# Patient Record
Sex: MALE | Race: WHITE | Employment: PART TIME | URBAN - METROPOLITAN AREA
[De-identification: names, ages, dates, MRNs, and addresses within clinical notes are randomized per-mention and may not be internally consistent; named-entity substitution may affect disease eponyms.]

---

## 2019-11-08 LAB — EJECTION FRACTION: NORMAL %

## 2020-01-02 LAB — EJECTION FRACTION: NORMAL %

## 2021-01-16 ENCOUNTER — TRANSFERRED RECORDS (OUTPATIENT)
Dept: HEALTH INFORMATION MANAGEMENT | Facility: CLINIC | Age: 56
End: 2021-01-16

## 2021-01-18 LAB — EJECTION FRACTION: <20 %

## 2021-01-19 PROCEDURE — 99223 1ST HOSP IP/OBS HIGH 75: CPT | Mod: GC | Performed by: INTERNAL MEDICINE

## 2021-01-20 LAB
ALT SERPL-CCNC: 346 IU/L (ref 12–68)
AST SERPL-CCNC: 309 IU/L (ref 12–37)
CREAT SERPL-MCNC: 1.34 MG/DL (ref 0.7–1.3)
GFR SERPL CREATININE-BSD FRML MDRD: 60 ML/MIN
GLUCOSE SERPL-MCNC: 110 MG/DL (ref 74–106)
POTASSIUM SERPL-SCNC: 3.4 MMOL/L (ref 3.5–5.1)

## 2021-01-20 NOTE — H&P
[facility]     Cardiology History and Physical - Cards 2       Date of Admission: 1/21    Assessment & Plan: S    Cordell Adams Jr. is a 55 year old male with a history of alcohol use disorder c/b withdrawal seizures, PE (2019), CAD (s/p PCI to pLAD 2019), afib, and mitral regurgitation transferred from [facility] for management of severe mitral regurgitation, newly diagnosed biventricular failure and afib with RVR.    # HFrEF, acute NYHA functional class III, Stage C  # ICM  Last TTE 1/18/21 (rhythm afib, rate 104) with EF <20% (previously 55-60% 1/2020), severe global hypokinesis/biventricular dysfunction, flail P1 scallop of posterior mitral valve leaflet with severe mitral regurg (anterior jet), severe tricuspid regurg. BNP 1.8k on admission. CXR at OSH with curly b lines bilaterally, cardiomegaly.  Likely etiology multifactorial: CAD, severe mitral regurgitation, possible recurrent pulmonary emboli, alcohol related, etc  - Elevate HOB, supplemental oxygen PRN  - Strict input/output, daily weights  - Na < 2g, 1500 fluid restriction  - Goal BP < 130/80  - Avoid NSAIDs  - Obtain TTE  - Pharmacotherapy  -- ACE inhib/ARB/ARNi: holding PTA lisinopril 5mg  -- Beta blocker: (dec dose if mod HF) metop tartrate 25mg q6h  -- Semaj antag: hold for renal dysfunction  -- Diuretic: lasix 40mg once now; resume 40mg BID in AM  -- Patient was discharged with hydralizine 10mg TID, isordil 5mg TID; unsure if this is needed and concerned re: patient's compliance with a TID med following discharge. Will defer to day team  - Will need TTE 3 months following OMT to determine need for ICD  - Strongly encourage alcohol cessation    # Severe mitral regurgitation  # Severe tricuspid regurgitation  Prolapse of the posterior mitral valve leaflet with possible flail segment; severe mitral regurgitation with an eccentric anteriorly directed jet; first noted in Jan 2019.  Flail  P1 scallop of posterior mitral valve leaflet with severe mitral regurg (anterior jet) The patient was to be evaluated for valve repair. Tricuspid regurgitation newly diagnosed with TTE 1/18. [place] [facility] [place] discussion of mitral valve repair.   - Request TTE images be pushed to PACs (called [phone] but no answer, recommend trying again during business hours)  - CT surgery consult  - NPO + MENDEL order to better characterize valvular dz    # NSTEMI  # Hx CAD, HLD  On admission 1/16, Trop 0.1, subsequently downtrended. Likely type II demand ischemia in setting of new HFrEF, afib with RVR.   Hx: Coronary angiogram 3/5/2019 no disease in the left main artery, severe disease in the left anterior descending artery, minimal disease in the circumflex artery and minimal disease in the right coronary artery. S/p PCI at that time[place] [facility] [place]   - Telemetry  - Trend troponins, EKGs if patient experiences chest pain  - TTE pending   -- ASA, atorvastatin 40mg  -- AC: on heparin gtt  -- Beta blocker: metoprolol succinate 50mg BID  -- ACE inhibitor: Lisinopril 5mg  -- SL NGT PRN  - May need angiography prior to further decision for valve repair    # Paroxysmal Afib with RVR, currently rate controlled  Rates at OSH 110s-180s recently. Prior to hospitalization was prescribed Amiodarone 200mg, Metoprolol succinate 50mg [but patient was not taking]. Has failed cardioversion x 2 in past. CHADSVASC is 2 (CHF, MI) FT4 was elevated at 1.78 (1.46 ULN), though difficult to interpret in acute setting. Hypotensive with 5mg IV metoprolol in ED at OSH. Has been treated with bolus diltiazem and also gtt but most recently metoprolol 75mg BID and digoxin 125mcg prior to transfer  - Continue anticoagulation  - Metoprolol succinate 50mg BID  - Digoxin 125mcg daily  -- Level ordered for AM  - Per OSH cardiology, likely opt for amiodarone if RVR refractory to above  - MENDEL needed if DCCV considered    # Acute hypoxic  respiratory failure, resolved  Per chart, developed this in hospital. Likely related to fluid overload given resuscitation in ED and maintenance fluids to treat CHRISTOPHER. Improved with lasix gtt and patient was weaned to room air on 1/18. Procalcitonin was elevated 1/19 and he was started on ceftriaxone for possible aspiration pneumonia. At this point, he is not having infectious symptoms but he did have reports of fevers, cough, nausea PTA. Will continue for 2 more days to complete a 5 day course.   - Ceftriaxone 2g q24 1/19-1/23    # Upper extremity blood pressure differential  Notified by RN overnight that the patient has had a persistent discrepancy in BP readings: L arm is consistently <20mmHg compared with right. Patient asymptomatic. Initial BP measurements in L arm, however, were WNL. Given his history of CAD, do think he's at increased risk for PAD. Reasonable to ultrasound.   - BUE arterial duplex  - Day team to assess for claudication symptoms    # Prediabetes   A1c 5.7 at OSH. Was hyperglycemic intermittently.   - BID, HS BG; if high, can consider sliding scale insulin (was on this at OSH)    # Hypervolemic hyponatremia, improving  129 on admission, 135 prior to transfer. Will continue to monitor.    # Hx PE  Diagnosis in 2018. Noncompliance with apixaban due to cost. Was started on heparin gtt at OSH due to high suspicion for PE (d-dimer elevated but CTPA not performed due to CHRISTOPHER). At this point, do not have a high suspicion for PE.   - Anticoagulation for NSTEMI/afib as above  - Likely resume apixaban at discharge if low cost can be confirmed    # CHRISTOPHER, resolved  Cr was 2 1/16, baseline of 1. Likely 2/2 cardiorenal in light of the above. Improving. UA from OSH unremarkable.   - Continue to monitor    # Alcoholic hepatitis  # Direct hyperbilirubinemia  # Coagulopathy  Hepatocellular injury pattern according to r factor. Likely related to alcohol use, but may a have some component of hepatic congestion due to  HF. 1/20 RUQ US Coarsened heterogeneous echotexture of the hepatic parenchyma with increased echogenicity, suggesting hepatic steatosis. No surface contour nodularity or solid mass. Maddrey's discriminant function 49.7 (poor prognosis) indicating that he may benefit from glucocorticoid therapy. Down from 55 earlier this admission. Hepatitis panel, HIV at [facility] negative 1/18/21.   - Trend LFTs  - Consider GI consult  - Stress importance of alcohol cessation    # Alcohol use disorder  EtOH 168 mg/dL at OSH. Last drink was 1/16. Requiring PO diazepam at OSH for withdrawal. CD consult at OSH offered counseling and resource information. Scoring 1, 2 on CIWA prior to transfer, per notes. No benzos given 1/21 prior to transfer.   - CIWA protocol for scoring only, notify team if scoring high  - Thiamine/folate    # R Anterior chest wall sebaceous cyst  Pt was seen in 02/2020 to discuss mitral valve repair, and at that time, was referred to general surgery for removal of an anterior chest wall sebaceous cyst. cyst has been present for 12-15 years, and has enlarged slowly over time. Was planning to have cyst removed by general surgery 1/20 with MAC after holding heparin gtt 6 hrs. But this was cancelled in light of transfer  - Consider general surgery consult     Diet: CCH/2g Na/1500mL fluid restriction  DVT Prophylaxis: On therapeutic AC  Turner Catheter: not present  Code Status: Full  Fluids: 1500 fluid restriction  Lines: peripheral IVs     Disposition Plan   Expected discharge: 2 - 3 days, recommended to prior living arrangement once optimize volue status on PO meds and plan for management of severe mitral regurgitaion.    Entered: Angela Salvador MD 01/19/2021, 8:09 PM     The patient's care was discussed with the Attending Physician, Dr. Becerra.    Angela Salvador MD  [facility]   Please see sign in/sign out for up to date coverage  information  ______________________________________________________________________    Chief Complaint   Shortness of breath    History is obtained from the patient and by chart review.    History of Present Illness   Cordell Adams Jr. is a 55 year old male with a history of alcohol use disorder c/b withdrawal, PE (2019), CAD (s/p PCI to pLAD 2019), afib, and mitral regurgitation transferred from [facility] for management of newly diagnosed biventricular failure and afib with RVR.     The patient presented to [facility] on 1/16 with complaints of shortness of breath, productive cough, nausea, fevers, and vomiting for a week. He also reported decreased appetite, orthopnea and occasional chest discomfort. He was drinking approximately 1 liter of vodka daily. He had been noncompliant with his medications (including xarelto) due to cost since May 2020.    On presentation he was found to be in afiv with RVR to the 160s. COVID was negative 1/16. Attempted rate control with 5mg IV metoprolol, however the patient became hypotensive. He was given IV fluids (per chart, 2L ordered but notes say only 1L given in ED). 20mg IV diltiazem was helpful with rate control. D-dimer was high and he had an CHRISTOPHER with Cr of 2. He was started on high dose heparin for possible PE as well as for anticoagulation for afib. CT PE was deferred given his CHRISTOPHER. He was started on continuous fluids at 75/hr for CHRISTOPHER through 1/18, developing an oxygen requirement in that time. TTE demonstrated significantly reduced EF (<20%) compared with prior. He was started on a lasix drip. Alcohol withdrawal treated with CIWA. General surgery was consulted for a sebaceous cyst on his anterior chest (apparently this was indicated prior to discussion of mitral valve repair). He was scheduled to have it removed 1/20, but this was deferred due to transfer.    Mr Adams states he hasn't been taking his meds since May 2020. He has no history of HTN that he  knows of. Thinks he has a history of withdrawal seizures but is not sure when the last one occured. He reports his breathing is 'so-so', and that he feels short of breath with walking short distances with PT. He denies hest pain, light-headedness, shortness of breath, palpitations, edema; has been experiencing orthopnea, PND. Has been having lots of urine output with lasix. He denies any symptoms of alcohol withdrawal today. Denies fevers, chills, headache, sore throat, productive cough, abdominal pain/fullness, constipation/diarrhea, nausea/vomiting, difficulty urinating, dysuria, weakness, bleeding/bruising, new skin findings.     The patient lives in an apartment with his finance; Natasha ([phone]). He is independent at baseline and doesn't use any medical devices.     Past Medical History    I have reviewed this patient's medical history and updated it with pertinent information if needed.   No past medical history on file.    Past Surgical History   I have reviewed this patient's surgical history and updated it with pertinent information if needed.  No past surgical history on file.    Social History   I have reviewed this patient's social history and updated it with pertinent information if needed.  Social History     Tobacco Use     Smoking status: Not on file   Substance Use Topics     Alcohol use: Not on file     Drug use: Not on file     Family History   I have reviewed this patient's family history and updated it with pertinent information if needed.     Family history of diabetes, alcohol abuse.     Prior to Admission Medications   Cannot display prior to admission medications because the patient has not been admitted in this contact.     Allergies   Not on File    Physical Exam   Vital Signs:                    Weight: 0 lbs 0 oz    General Appearance: tired-appearing male, lying in bed  Eyes: EOMI, PERRL, mild scleral icterus  HEENT: NCAT, neck supple  Respiratory: Normal wor  Cardiovascular:  tachycardic, irregularly irregular rhythm; 3/6 systolic murmur heard at apex; 2+ peripheral pulses; no edema; no JVD appreciated  GI: abdomen soft, nontender; normal bowel sounds  Skin: large cyst on right anterior chest wall with visible punctum, minimal erythema  Musculoskeletal: no overt joint swelling  Neurologic: A&O, CN II-XII grossly intact, moving all extremities spontaneously  Psychiatric: neutral mood, flat affect    Data   Data reviewed today: I reviewed all medications, new labs and imaging results over the last 24 hours. I personally reviewed Cardiology specific data review: the EKG tracing showing afib with RVR     Recent Labs   Lab 01/21/21  2152 01/21/21  1845   WBC 5.6 6.5   HGB 16.0 16.8   MCV 99 97    183   INR  --  1.23*   NA  --  132*   POTASSIUM  --  4.2   CHLORIDE  --  101   CO2  --  24   BUN  --  34*   CR  --  1.15   ANIONGAP  --  8   BRAD  --  8.8   GLC  --  115*   ALBUMIN  --  2.7*   PROTTOTAL  --  6.1*   BILITOTAL  --  6.1*   ALKPHOS  --  161*   ALT  --  242*   AST  --  143*        US UPPER ABD 1/21/21  GALLBLADDER: No evidence of stones, wall thickening, or pericholecystic fluid.   BILE DUCTS: No biliary ductal dilatation.   LIVER: Diffusely coarsened and increased density throughout the liver. Small right hepatic cyst again noted.   SPLEEN: Normal sonographic appearance. Incidental note of a splenule measuring 20 cm, no   KIDNEYS: No hydronephrosis.   PANCREAS: No gross abnormality identified.   AORTA: Patent. Normal Color flow and spectral waveforms.   IVC and Portal Vein: Patent. Normal Color flow and spectral waveforms.   IMPRESSION:  Diffusely increased hepatic echogenicity most consistent with underlying diffuse fatty infiltration of liver. No focal abnormality. No biliary ductal dilatation.     ECHO 1/18/21  Interpretation Summary    * The left ventricular systolic function is severely decreased, estimated  LVEF <20% with severe global hypokinesis.  No LV thrombus visualized  with  contrast.    * The right ventricle is quantitatively enlarged with quantitatively reduced  right ventricular systolic function..    * Flail P1 scallop of the posterior mitral valve leaflet with severe mitral  regurgitation (anteriorly directed jet).    * There is severe tricuspid regurgitation.    * Pulmonary hypertension is not accurately quantified in the setting of  severe tricuspid regurgitation.    * Compared to prior study dated 01/02/2020, LV function has now declined  from normal.  Severe mitral regurgitation persists.     MENDEL 11/8/19 Summary    * No mass or thrombus formation in the left atrial appendage.    * The left ventricular systolic function is moderately decreased, estimated  LVEF 35-40%.    * Left ventricular wall motion is abnormal with moderate global hypokinesis  in the setting of afib with rvr.    * Quantitatively reduced right ventricular systolic function.    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    * Hepatic cyst noted, recommend hepatic ultrasound     ECHO 1/2/20  Interpretation Summary    * The left ventricle is mildly dilated with normal systolic function;  estimated LVEF 55-60%.    * Left ventricular wall motion is normal.    * Normal right ventricular systolic function.    * There is prolapse of the posterior mitral valve leaflet with possible  flail segment; there is severe mitral regurgitation with an eccentric  anteriorly directed jet.    * No pulmonary hypertension, estimated right ventricular systolic pressure  is 32 mmHg.    * Compared to prior study on 11/4/2019, the left ventricular systolic  function has improved and normalized on today's study; the degree of MR  remains severe with posterior leaflet proplase.     ECG 1/16/21:  Afib with rvr     Coronary Angiogram 3/5/2019:  Diagnostic Summary    * Angiography shows a right coronary dominant system.    * Coronary angiography  shows no disease in the left main artery, severe  disease in the left anterior descending artery, minimal disease in the  circumflex artery and minimal disease in the right coronary artery.    * Coronary angiography shows severe disease in the pLAD artery.     CXR 1/16/2021:  IMPRESSION:  1.  Minimal right base atelectasis.  2.  Unchanged mild cardiomegaly.

## 2021-01-21 ENCOUNTER — APPOINTMENT (OUTPATIENT)
Dept: GENERAL RADIOLOGY | Facility: CLINIC | Age: 56
End: 2021-01-21
Attending: INTERNAL MEDICINE
Payer: COMMERCIAL

## 2021-01-21 ENCOUNTER — HOSPITAL ENCOUNTER (INPATIENT)
Facility: CLINIC | Age: 56
LOS: 7 days | Discharge: HOME | End: 2021-01-28
Attending: INTERNAL MEDICINE | Admitting: INTERNAL MEDICINE
Payer: COMMERCIAL

## 2021-01-21 DIAGNOSIS — I34.0 NONRHEUMATIC MITRAL VALVE REGURGITATION: ICD-10-CM

## 2021-01-21 DIAGNOSIS — I50.9 ACUTE DECOMPENSATED HEART FAILURE (H): Primary | ICD-10-CM

## 2021-01-21 LAB
ALBUMIN SERPL-MCNC: 2.7 G/DL (ref 3.4–5)
ALP SERPL-CCNC: 161 U/L (ref 40–150)
ALT SERPL W P-5'-P-CCNC: 242 U/L (ref 0–70)
ANION GAP SERPL CALCULATED.3IONS-SCNC: 8 MMOL/L (ref 3–14)
APTT PPP: 49 SEC (ref 22–37)
APTT PPP: 74 SEC (ref 22–37)
APTT PPP: 93 SEC (ref 22–37)
APTT PPP: NORMAL SEC (ref 22–37)
AST SERPL W P-5'-P-CCNC: 143 U/L (ref 0–45)
BILIRUB DIRECT SERPL-MCNC: 4.6 MG/DL (ref 0–0.2)
BILIRUB SERPL-MCNC: 6.1 MG/DL (ref 0.2–1.3)
BUN SERPL-MCNC: 34 MG/DL (ref 7–30)
CALCIUM SERPL-MCNC: 8.8 MG/DL (ref 8.5–10.1)
CHLORIDE SERPL-SCNC: 101 MMOL/L (ref 94–109)
CO2 SERPL-SCNC: 24 MMOL/L (ref 20–32)
CREAT SERPL-MCNC: 1.15 MG/DL (ref 0.66–1.25)
ERYTHROCYTE [DISTWIDTH] IN BLOOD BY AUTOMATED COUNT: 15.7 % (ref 10–15)
ERYTHROCYTE [DISTWIDTH] IN BLOOD BY AUTOMATED COUNT: 15.9 % (ref 10–15)
GFR SERPL CREATININE-BSD FRML MDRD: 72 ML/MIN/{1.73_M2}
GLUCOSE SERPL-MCNC: 115 MG/DL (ref 70–99)
HCT VFR BLD AUTO: 45.9 % (ref 40–53)
HCT VFR BLD AUTO: 48.3 % (ref 40–53)
HGB BLD-MCNC: 16 G/DL (ref 13.3–17.7)
HGB BLD-MCNC: 16.8 G/DL (ref 13.3–17.7)
INR PPP: 1.23 (ref 0.86–1.14)
MAGNESIUM SERPL-MCNC: 2.2 MG/DL (ref 1.6–2.3)
MCH RBC QN AUTO: 33.9 PG (ref 26.5–33)
MCH RBC QN AUTO: 34.3 PG (ref 26.5–33)
MCHC RBC AUTO-ENTMCNC: 34.8 G/DL (ref 31.5–36.5)
MCHC RBC AUTO-ENTMCNC: 34.9 G/DL (ref 31.5–36.5)
MCV RBC AUTO: 97 FL (ref 78–100)
MCV RBC AUTO: 99 FL (ref 78–100)
NT-PROBNP SERPL-MCNC: 1713 PG/ML (ref 0–900)
PHOSPHATE SERPL-MCNC: 3.5 MG/DL (ref 2.5–4.5)
PLATELET # BLD AUTO: 166 10E9/L (ref 150–450)
PLATELET # BLD AUTO: 183 10E9/L (ref 150–450)
POTASSIUM SERPL-SCNC: 4.2 MMOL/L (ref 3.4–5.3)
PROT SERPL-MCNC: 6.1 G/DL (ref 6.8–8.8)
RBC # BLD AUTO: 4.66 10E12/L (ref 4.4–5.9)
RBC # BLD AUTO: 4.96 10E12/L (ref 4.4–5.9)
SODIUM SERPL-SCNC: 132 MMOL/L (ref 133–144)
WBC # BLD AUTO: 5.6 10E9/L (ref 4–11)
WBC # BLD AUTO: 6.5 10E9/L (ref 4–11)

## 2021-01-21 PROCEDURE — 84100 ASSAY OF PHOSPHORUS: CPT | Performed by: INTERNAL MEDICINE

## 2021-01-21 PROCEDURE — 36415 COLL VENOUS BLD VENIPUNCTURE: CPT | Performed by: INTERNAL MEDICINE

## 2021-01-21 PROCEDURE — 83735 ASSAY OF MAGNESIUM: CPT | Performed by: INTERNAL MEDICINE

## 2021-01-21 PROCEDURE — 93005 ELECTROCARDIOGRAM TRACING: CPT

## 2021-01-21 PROCEDURE — 71045 X-RAY EXAM CHEST 1 VIEW: CPT

## 2021-01-21 PROCEDURE — 85730 THROMBOPLASTIN TIME PARTIAL: CPT | Performed by: INTERNAL MEDICINE

## 2021-01-21 PROCEDURE — 93010 ELECTROCARDIOGRAM REPORT: CPT | Performed by: INTERNAL MEDICINE

## 2021-01-21 PROCEDURE — 71045 X-RAY EXAM CHEST 1 VIEW: CPT | Mod: 26 | Performed by: RADIOLOGY

## 2021-01-21 PROCEDURE — 250N000013 HC RX MED GY IP 250 OP 250 PS 637: Performed by: STUDENT IN AN ORGANIZED HEALTH CARE EDUCATION/TRAINING PROGRAM

## 2021-01-21 PROCEDURE — 85027 COMPLETE CBC AUTOMATED: CPT | Performed by: STUDENT IN AN ORGANIZED HEALTH CARE EDUCATION/TRAINING PROGRAM

## 2021-01-21 PROCEDURE — 85610 PROTHROMBIN TIME: CPT | Performed by: INTERNAL MEDICINE

## 2021-01-21 PROCEDURE — 83880 ASSAY OF NATRIURETIC PEPTIDE: CPT | Performed by: INTERNAL MEDICINE

## 2021-01-21 PROCEDURE — 999N001064 HC STATISTIC DRUG SCREEN MULTIPLE (METRO): Performed by: INTERNAL MEDICINE

## 2021-01-21 PROCEDURE — 85730 THROMBOPLASTIN TIME PARTIAL: CPT | Performed by: STUDENT IN AN ORGANIZED HEALTH CARE EDUCATION/TRAINING PROGRAM

## 2021-01-21 PROCEDURE — 36415 COLL VENOUS BLD VENIPUNCTURE: CPT | Performed by: STUDENT IN AN ORGANIZED HEALTH CARE EDUCATION/TRAINING PROGRAM

## 2021-01-21 PROCEDURE — 80076 HEPATIC FUNCTION PANEL: CPT | Performed by: INTERNAL MEDICINE

## 2021-01-21 PROCEDURE — 85027 COMPLETE CBC AUTOMATED: CPT | Performed by: INTERNAL MEDICINE

## 2021-01-21 PROCEDURE — 250N000011 HC RX IP 250 OP 636: Performed by: STUDENT IN AN ORGANIZED HEALTH CARE EDUCATION/TRAINING PROGRAM

## 2021-01-21 PROCEDURE — 80307 DRUG TEST PRSMV CHEM ANLYZR: CPT | Performed by: INTERNAL MEDICINE

## 2021-01-21 PROCEDURE — 80048 BASIC METABOLIC PNL TOTAL CA: CPT | Performed by: INTERNAL MEDICINE

## 2021-01-21 PROCEDURE — 214N000001 HC R&B CCU UMMC

## 2021-01-21 RX ORDER — NITROGLYCERIN 0.4 MG/1
0.4 TABLET SUBLINGUAL EVERY 5 MIN PRN
Status: DISCONTINUED | OUTPATIENT
Start: 2021-01-21 | End: 2021-01-28 | Stop reason: HOSPADM

## 2021-01-21 RX ORDER — AMOXICILLIN 250 MG
1 CAPSULE ORAL 2 TIMES DAILY
Status: DISCONTINUED | OUTPATIENT
Start: 2021-01-21 | End: 2021-01-28 | Stop reason: HOSPADM

## 2021-01-21 RX ORDER — MAGNESIUM HYDROXIDE/ALUMINUM HYDROXICE/SIMETHICONE 120; 1200; 1200 MG/30ML; MG/30ML; MG/30ML
30 SUSPENSION ORAL EVERY 4 HOURS PRN
Status: DISCONTINUED | OUTPATIENT
Start: 2021-01-21 | End: 2021-01-28 | Stop reason: HOSPADM

## 2021-01-21 RX ORDER — ACETAMINOPHEN 325 MG/1
650 TABLET ORAL EVERY 4 HOURS PRN
Status: DISCONTINUED | OUTPATIENT
Start: 2021-01-21 | End: 2021-01-28 | Stop reason: HOSPADM

## 2021-01-21 RX ORDER — HEPARIN SODIUM 10000 [USP'U]/100ML
0-5000 INJECTION, SOLUTION INTRAVENOUS CONTINUOUS
Status: DISCONTINUED | OUTPATIENT
Start: 2021-01-21 | End: 2021-01-25

## 2021-01-21 RX ORDER — POLYETHYLENE GLYCOL 3350 17 G/17G
17 POWDER, FOR SOLUTION ORAL DAILY
Status: DISCONTINUED | OUTPATIENT
Start: 2021-01-21 | End: 2021-01-28 | Stop reason: HOSPADM

## 2021-01-21 RX ORDER — FUROSEMIDE 10 MG/ML
40 INJECTION INTRAMUSCULAR; INTRAVENOUS ONCE
Status: COMPLETED | OUTPATIENT
Start: 2021-01-21 | End: 2021-01-21

## 2021-01-21 RX ORDER — HEPARIN SODIUM 10000 [USP'U]/100ML
0-5000 INJECTION, SOLUTION INTRAVENOUS CONTINUOUS
Status: DISCONTINUED | OUTPATIENT
Start: 2021-01-21 | End: 2021-01-21

## 2021-01-21 RX ORDER — LIDOCAINE 40 MG/G
CREAM TOPICAL
Status: DISCONTINUED | OUTPATIENT
Start: 2021-01-21 | End: 2021-01-22

## 2021-01-21 RX ORDER — METOPROLOL SUCCINATE 50 MG/1
50 TABLET, EXTENDED RELEASE ORAL 2 TIMES DAILY
Status: DISCONTINUED | OUTPATIENT
Start: 2021-01-21 | End: 2021-01-27

## 2021-01-21 RX ORDER — AMOXICILLIN 250 MG
2 CAPSULE ORAL 2 TIMES DAILY
Status: DISCONTINUED | OUTPATIENT
Start: 2021-01-21 | End: 2021-01-28 | Stop reason: HOSPADM

## 2021-01-21 RX ADMIN — HEPARIN SODIUM 900 UNITS/HR: 10000 INJECTION, SOLUTION INTRAVENOUS at 21:54

## 2021-01-21 RX ADMIN — METOPROLOL SUCCINATE 50 MG: 50 TABLET, EXTENDED RELEASE ORAL at 21:58

## 2021-01-21 RX ADMIN — HEPARIN SODIUM 1350 UNITS/HR: 10000 INJECTION, SOLUTION INTRAVENOUS at 20:38

## 2021-01-21 RX ADMIN — FUROSEMIDE 40 MG: 10 INJECTION, SOLUTION INTRAVENOUS at 22:00

## 2021-01-21 ASSESSMENT — ACTIVITIES OF DAILY LIVING (ADL)
ADLS_ACUITY_SCORE: 15
DOING_ERRANDS_INDEPENDENTLY_DIFFICULTY: NO

## 2021-01-21 ASSESSMENT — MIFFLIN-ST. JEOR: SCORE: 1562.5

## 2021-01-21 NOTE — Clinical Note
dry, intact, no bleeding and no hematoma. 7fr RIJ sheath removed manual pressure applied, hemostasis achieved, bandage placed, 6fr RRA sheath removed, TR band placed with 12 ml of air.

## 2021-01-22 ENCOUNTER — APPOINTMENT (OUTPATIENT)
Dept: ULTRASOUND IMAGING | Facility: CLINIC | Age: 56
End: 2021-01-22
Attending: INTERNAL MEDICINE
Payer: COMMERCIAL

## 2021-01-22 ENCOUNTER — APPOINTMENT (OUTPATIENT)
Dept: CT IMAGING | Facility: CLINIC | Age: 56
End: 2021-01-22
Attending: PHYSICIAN ASSISTANT
Payer: COMMERCIAL

## 2021-01-22 ENCOUNTER — APPOINTMENT (OUTPATIENT)
Dept: CARDIOLOGY | Facility: CLINIC | Age: 56
End: 2021-01-22
Attending: INTERNAL MEDICINE
Payer: COMMERCIAL

## 2021-01-22 LAB
ACETAMINOPHEN QUAL: NEGATIVE
ALBUMIN SERPL-MCNC: 2.8 G/DL (ref 3.4–5)
ALP SERPL-CCNC: 159 U/L (ref 40–150)
ALT SERPL W P-5'-P-CCNC: 226 U/L (ref 0–70)
AMANTADINE: NEGATIVE
AMITRIPTYLINE QUAL: NEGATIVE
AMOXAPINE: NEGATIVE
AMPHETAMINES QUAL: NEGATIVE
ANION GAP SERPL CALCULATED.3IONS-SCNC: 10 MMOL/L (ref 3–14)
ANION GAP SERPL CALCULATED.3IONS-SCNC: 8 MMOL/L (ref 3–14)
AST SERPL W P-5'-P-CCNC: 114 U/L (ref 0–45)
ATROPINE: NEGATIVE
BENZODIAZ UR QL: POSITIVE
BILIRUB SERPL-MCNC: 5.5 MG/DL (ref 0.2–1.3)
BUN SERPL-MCNC: 34 MG/DL (ref 7–30)
BUN SERPL-MCNC: 35 MG/DL (ref 7–30)
BUPROPION QUAL: NEGATIVE
CAFFEINE QUAL: POSITIVE
CALCIUM SERPL-MCNC: 8.8 MG/DL (ref 8.5–10.1)
CALCIUM SERPL-MCNC: 9.1 MG/DL (ref 8.5–10.1)
CANNABINOIDS UR QL SCN: NEGATIVE
CARBAMAZEPINE QUAL: NEGATIVE
CHLORIDE SERPL-SCNC: 101 MMOL/L (ref 94–109)
CHLORIDE SERPL-SCNC: 98 MMOL/L (ref 94–109)
CHLORPHENIRAMINE: NEGATIVE
CHLORPROMAZINE: NEGATIVE
CITALOPRAM QUAL: NEGATIVE
CLOMIPRAMINE QUAL: NEGATIVE
CMV IGG SERPL QL IA: <0.2 AI (ref 0–0.8)
CO2 SERPL-SCNC: 23 MMOL/L (ref 20–32)
CO2 SERPL-SCNC: 24 MMOL/L (ref 20–32)
COCAINE QUAL: NEGATIVE
COCAINE UR QL: NEGATIVE
CODEINE QUAL: NEGATIVE
CREAT SERPL-MCNC: 1.11 MG/DL (ref 0.66–1.25)
CREAT SERPL-MCNC: 1.24 MG/DL (ref 0.66–1.25)
DESIPRAMINE QUAL: NEGATIVE
DEXTROMETHORPHAN: NEGATIVE
DIGOXIN SERPL-MCNC: 1.9 UG/L (ref 0.5–2)
DIPHENHYDRAMINE: NEGATIVE
DOXEPIN/METABOLITE: NEGATIVE
DOXYLAMINE: NEGATIVE
EPHEDRINE OR PSEUDO: NEGATIVE
FENTANYL QUAL: NEGATIVE
FLUOXETINE AND METAB: NEGATIVE
GFR SERPL CREATININE-BSD FRML MDRD: 66 ML/MIN/{1.73_M2}
GFR SERPL CREATININE-BSD FRML MDRD: 75 ML/MIN/{1.73_M2}
GLUCOSE BLDC GLUCOMTR-MCNC: 155 MG/DL (ref 70–99)
GLUCOSE BLDC GLUCOMTR-MCNC: 200 MG/DL (ref 70–99)
GLUCOSE SERPL-MCNC: 114 MG/DL (ref 70–99)
GLUCOSE SERPL-MCNC: 178 MG/DL (ref 70–99)
HAV IGG SER QL IA: NONREACTIVE
HBV CORE AB SERPL QL IA: NONREACTIVE
HBV CORE IGM SERPL QL IA: NONREACTIVE
HBV SURFACE AB SERPL IA-ACNC: 0 M[IU]/ML
HBV SURFACE AG SERPL QL IA: NONREACTIVE
HCV AB SERPL QL IA: NONREACTIVE
HYDROCODONE QUAL: NEGATIVE
HYDROMORPHONE QUAL: NEGATIVE
IBUPROFEN QUAL: NEGATIVE
IMIPRAMINE QUAL: NEGATIVE
INTERPRETATION ECG - MUSE: NORMAL
KETAMINE QUAL: NEGATIVE
LAMOTRIGINE QUAL: NEGATIVE
LIDOCAIN SPEC QL: NEGATIVE
LOXAPINE: NEGATIVE
MAGNESIUM SERPL-MCNC: 2 MG/DL (ref 1.6–2.3)
MAGNESIUM SERPL-MCNC: 2 MG/DL (ref 1.6–2.3)
MAPROTYLINE: NEGATIVE
MDMA QUAL: NEGATIVE
MEPERIDINE QUAL: NEGATIVE
METHAMPHETAMINE: NEGATIVE
METHODONE QUAL: NEGATIVE
MIRTAZAPINE QUAL: NEGATIVE
MORPHINE QUAL: NEGATIVE
NICOTINE: NEGATIVE
NORTRIPTYLINE QUAL: NEGATIVE
OLANZAPINE QUAL: NEGATIVE
OPIATES UR QL SCN: NEGATIVE
OXYCODONE QUAL: NEGATIVE
PENTAZOCINE: NEGATIVE
PHENCYCLIDINE QUAL: NEGATIVE
PHENTERMINE: NEGATIVE
POTASSIUM SERPL-SCNC: 3.6 MMOL/L (ref 3.4–5.3)
POTASSIUM SERPL-SCNC: 3.8 MMOL/L (ref 3.4–5.3)
PROPOFOL QUAL: NEGATIVE
PROPOXPHENE QUAL: NEGATIVE
PROPRANOLOL QUAL: NEGATIVE
PROT SERPL-MCNC: 6.2 G/DL (ref 6.8–8.8)
PYRILAMINE: NEGATIVE
QUETIAPINE METAB QUAL: NEGATIVE
SALICYLATE QUAL: NEGATIVE
SERTRALINE QUAL: NEGATIVE
SODIUM SERPL-SCNC: 131 MMOL/L (ref 133–144)
SODIUM SERPL-SCNC: 133 MMOL/L (ref 133–144)
THEOBROMINE: NEGATIVE
TOPIRAMATE QUAL: NEGATIVE
TRAMADOL QUAL: NEGATIVE
TRIMIPRAMINE QUAL: NEGATIVE
UFH PPP CHRO-ACNC: 0.3 IU/ML
UFH PPP CHRO-ACNC: 0.33 IU/ML
VENLAFAXINE QUAL: NEGATIVE

## 2021-01-22 PROCEDURE — 86706 HEP B SURFACE ANTIBODY: CPT | Performed by: STUDENT IN AN ORGANIZED HEALTH CARE EDUCATION/TRAINING PROGRAM

## 2021-01-22 PROCEDURE — 85520 HEPARIN ASSAY: CPT | Performed by: STUDENT IN AN ORGANIZED HEALTH CARE EDUCATION/TRAINING PROGRAM

## 2021-01-22 PROCEDURE — 250N000013 HC RX MED GY IP 250 OP 250 PS 637: Performed by: STUDENT IN AN ORGANIZED HEALTH CARE EDUCATION/TRAINING PROGRAM

## 2021-01-22 PROCEDURE — 93325 DOPPLER ECHO COLOR FLOW MAPG: CPT

## 2021-01-22 PROCEDURE — 250N000011 HC RX IP 250 OP 636: Performed by: INTERNAL MEDICINE

## 2021-01-22 PROCEDURE — 71250 CT THORAX DX C-: CPT

## 2021-01-22 PROCEDURE — 250N000009 HC RX 250: Performed by: INTERNAL MEDICINE

## 2021-01-22 PROCEDURE — 74176 CT ABD & PELVIS W/O CONTRAST: CPT | Mod: 26 | Performed by: RADIOLOGY

## 2021-01-22 PROCEDURE — 99153 MOD SED SAME PHYS/QHP EA: CPT | Performed by: INTERNAL MEDICINE

## 2021-01-22 PROCEDURE — 36415 COLL VENOUS BLD VENIPUNCTURE: CPT | Performed by: STUDENT IN AN ORGANIZED HEALTH CARE EDUCATION/TRAINING PROGRAM

## 2021-01-22 PROCEDURE — 93930 UPPER EXTREMITY STUDY: CPT

## 2021-01-22 PROCEDURE — 86708 HEPATITIS A ANTIBODY: CPT | Performed by: STUDENT IN AN ORGANIZED HEALTH CARE EDUCATION/TRAINING PROGRAM

## 2021-01-22 PROCEDURE — 86705 HEP B CORE ANTIBODY IGM: CPT | Performed by: STUDENT IN AN ORGANIZED HEALTH CARE EDUCATION/TRAINING PROGRAM

## 2021-01-22 PROCEDURE — 36415 COLL VENOUS BLD VENIPUNCTURE: CPT | Performed by: INTERNAL MEDICINE

## 2021-01-22 PROCEDURE — 80053 COMPREHEN METABOLIC PANEL: CPT | Performed by: STUDENT IN AN ORGANIZED HEALTH CARE EDUCATION/TRAINING PROGRAM

## 2021-01-22 PROCEDURE — 80048 BASIC METABOLIC PNL TOTAL CA: CPT | Performed by: STUDENT IN AN ORGANIZED HEALTH CARE EDUCATION/TRAINING PROGRAM

## 2021-01-22 PROCEDURE — 214N000001 HC R&B CCU UMMC

## 2021-01-22 PROCEDURE — 83735 ASSAY OF MAGNESIUM: CPT | Performed by: STUDENT IN AN ORGANIZED HEALTH CARE EDUCATION/TRAINING PROGRAM

## 2021-01-22 PROCEDURE — 87340 HEPATITIS B SURFACE AG IA: CPT | Performed by: STUDENT IN AN ORGANIZED HEALTH CARE EDUCATION/TRAINING PROGRAM

## 2021-01-22 PROCEDURE — 71250 CT THORAX DX C-: CPT | Mod: 26 | Performed by: RADIOLOGY

## 2021-01-22 PROCEDURE — 76376 3D RENDER W/INTRP POSTPROCES: CPT | Mod: 26 | Performed by: INTERNAL MEDICINE

## 2021-01-22 PROCEDURE — 258N000003 HC RX IP 258 OP 636: Performed by: INTERNAL MEDICINE

## 2021-01-22 PROCEDURE — 999N001017 HC STATISTIC GLUCOSE BY METER IP

## 2021-01-22 PROCEDURE — 85520 HEPARIN ASSAY: CPT | Performed by: INTERNAL MEDICINE

## 2021-01-22 PROCEDURE — 76376 3D RENDER W/INTRP POSTPROCES: CPT

## 2021-01-22 PROCEDURE — 86644 CMV ANTIBODY: CPT | Performed by: STUDENT IN AN ORGANIZED HEALTH CARE EDUCATION/TRAINING PROGRAM

## 2021-01-22 PROCEDURE — 93312 ECHO TRANSESOPHAGEAL: CPT | Mod: 26 | Performed by: INTERNAL MEDICINE

## 2021-01-22 PROCEDURE — 86704 HEP B CORE ANTIBODY TOTAL: CPT | Performed by: STUDENT IN AN ORGANIZED HEALTH CARE EDUCATION/TRAINING PROGRAM

## 2021-01-22 PROCEDURE — 76705 ECHO EXAM OF ABDOMEN: CPT | Mod: 26 | Performed by: RADIOLOGY

## 2021-01-22 PROCEDURE — 76705 ECHO EXAM OF ABDOMEN: CPT

## 2021-01-22 PROCEDURE — 80162 ASSAY OF DIGOXIN TOTAL: CPT | Performed by: STUDENT IN AN ORGANIZED HEALTH CARE EDUCATION/TRAINING PROGRAM

## 2021-01-22 PROCEDURE — 93930 UPPER EXTREMITY STUDY: CPT | Mod: 26 | Performed by: RADIOLOGY

## 2021-01-22 PROCEDURE — 99222 1ST HOSP IP/OBS MODERATE 55: CPT | Performed by: SURGERY

## 2021-01-22 PROCEDURE — 250N000011 HC RX IP 250 OP 636: Performed by: STUDENT IN AN ORGANIZED HEALTH CARE EDUCATION/TRAINING PROGRAM

## 2021-01-22 PROCEDURE — 250N000013 HC RX MED GY IP 250 OP 250 PS 637: Performed by: INTERNAL MEDICINE

## 2021-01-22 PROCEDURE — 99152 MOD SED SAME PHYS/QHP 5/>YRS: CPT | Performed by: INTERNAL MEDICINE

## 2021-01-22 PROCEDURE — 93320 DOPPLER ECHO COMPLETE: CPT | Mod: 26 | Performed by: INTERNAL MEDICINE

## 2021-01-22 PROCEDURE — 99233 SBSQ HOSP IP/OBS HIGH 50: CPT | Mod: GC | Performed by: INTERNAL MEDICINE

## 2021-01-22 PROCEDURE — 93325 DOPPLER ECHO COLOR FLOW MAPG: CPT | Mod: 26 | Performed by: INTERNAL MEDICINE

## 2021-01-22 PROCEDURE — 86803 HEPATITIS C AB TEST: CPT | Performed by: STUDENT IN AN ORGANIZED HEALTH CARE EDUCATION/TRAINING PROGRAM

## 2021-01-22 RX ORDER — LIDOCAINE HYDROCHLORIDE 20 MG/ML
15 SOLUTION OROPHARYNGEAL ONCE
Status: COMPLETED | OUTPATIENT
Start: 2021-01-22 | End: 2021-01-22

## 2021-01-22 RX ORDER — FLUMAZENIL 0.1 MG/ML
0.2 INJECTION, SOLUTION INTRAVENOUS
Status: ACTIVE | OUTPATIENT
Start: 2021-01-22 | End: 2021-01-24

## 2021-01-22 RX ORDER — ACYCLOVIR 200 MG/1
9.5 CAPSULE ORAL
Status: DISCONTINUED | OUTPATIENT
Start: 2021-01-22 | End: 2021-01-22

## 2021-01-22 RX ORDER — SODIUM CHLORIDE 9 MG/ML
INJECTION, SOLUTION INTRAVENOUS CONTINUOUS PRN
Status: DISCONTINUED | OUTPATIENT
Start: 2021-01-22 | End: 2021-01-22

## 2021-01-22 RX ORDER — FENTANYL CITRATE 50 UG/ML
25 INJECTION, SOLUTION INTRAMUSCULAR; INTRAVENOUS
Status: DISCONTINUED | OUTPATIENT
Start: 2021-01-22 | End: 2021-01-25 | Stop reason: HOSPADM

## 2021-01-22 RX ORDER — NALOXONE HYDROCHLORIDE 0.4 MG/ML
0.4 INJECTION, SOLUTION INTRAMUSCULAR; INTRAVENOUS; SUBCUTANEOUS
Status: ACTIVE | OUTPATIENT
Start: 2021-01-22 | End: 2021-01-24

## 2021-01-22 RX ORDER — DIAZEPAM 5 MG
10 TABLET ORAL EVERY 30 MIN PRN
Status: DISCONTINUED | OUTPATIENT
Start: 2021-01-22 | End: 2021-01-28 | Stop reason: HOSPADM

## 2021-01-22 RX ORDER — NALOXONE HYDROCHLORIDE 0.4 MG/ML
0.4 INJECTION, SOLUTION INTRAMUSCULAR; INTRAVENOUS; SUBCUTANEOUS
Status: DISCONTINUED | OUTPATIENT
Start: 2021-01-22 | End: 2021-01-22

## 2021-01-22 RX ORDER — CEFTRIAXONE 2 G/1
2 INJECTION, POWDER, FOR SOLUTION INTRAMUSCULAR; INTRAVENOUS EVERY 24 HOURS
Status: DISCONTINUED | OUTPATIENT
Start: 2021-01-22 | End: 2021-01-22

## 2021-01-22 RX ORDER — MAGNESIUM SULFATE HEPTAHYDRATE 40 MG/ML
2 INJECTION, SOLUTION INTRAVENOUS ONCE
Status: COMPLETED | OUTPATIENT
Start: 2021-01-22 | End: 2021-01-22

## 2021-01-22 RX ORDER — POTASSIUM CHLORIDE 750 MG/1
20 TABLET, EXTENDED RELEASE ORAL ONCE
Status: COMPLETED | OUTPATIENT
Start: 2021-01-22 | End: 2021-01-22

## 2021-01-22 RX ORDER — FUROSEMIDE 40 MG
40 TABLET ORAL
Status: DISCONTINUED | OUTPATIENT
Start: 2021-01-22 | End: 2021-01-25

## 2021-01-22 RX ORDER — FENTANYL CITRATE 50 UG/ML
50 INJECTION, SOLUTION INTRAMUSCULAR; INTRAVENOUS ONCE
Status: DISCONTINUED | OUTPATIENT
Start: 2021-01-22 | End: 2021-01-22

## 2021-01-22 RX ORDER — NALOXONE HYDROCHLORIDE 0.4 MG/ML
0.2 INJECTION, SOLUTION INTRAMUSCULAR; INTRAVENOUS; SUBCUTANEOUS
Status: ACTIVE | OUTPATIENT
Start: 2021-01-22 | End: 2021-01-24

## 2021-01-22 RX ORDER — FOLIC ACID 1 MG/1
1 TABLET ORAL DAILY
Status: DISCONTINUED | OUTPATIENT
Start: 2021-01-22 | End: 2021-01-22

## 2021-01-22 RX ORDER — LIDOCAINE 40 MG/G
CREAM TOPICAL
Status: DISCONTINUED | OUTPATIENT
Start: 2021-01-22 | End: 2021-01-25 | Stop reason: HOSPADM

## 2021-01-22 RX ORDER — NALOXONE HYDROCHLORIDE 0.4 MG/ML
0.2 INJECTION, SOLUTION INTRAMUSCULAR; INTRAVENOUS; SUBCUTANEOUS
Status: DISCONTINUED | OUTPATIENT
Start: 2021-01-22 | End: 2021-01-22

## 2021-01-22 RX ORDER — CEFTRIAXONE 2 G/1
2 INJECTION, POWDER, FOR SOLUTION INTRAMUSCULAR; INTRAVENOUS EVERY 24 HOURS
Status: COMPLETED | OUTPATIENT
Start: 2021-01-22 | End: 2021-01-23

## 2021-01-22 RX ORDER — LISINOPRIL 5 MG/1
5 TABLET ORAL DAILY
Status: DISCONTINUED | OUTPATIENT
Start: 2021-01-22 | End: 2021-01-22

## 2021-01-22 RX ORDER — LANOLIN ALCOHOL/MO/W.PET/CERES
100 CREAM (GRAM) TOPICAL DAILY
Status: COMPLETED | OUTPATIENT
Start: 2021-01-23 | End: 2021-01-27

## 2021-01-22 RX ORDER — MULTIPLE VITAMINS W/ MINERALS TAB 9MG-400MCG
1 TAB ORAL DAILY
Status: DISCONTINUED | OUTPATIENT
Start: 2021-01-22 | End: 2021-01-28 | Stop reason: HOSPADM

## 2021-01-22 RX ORDER — ASPIRIN 81 MG/1
81 TABLET, CHEWABLE ORAL DAILY
Status: DISCONTINUED | OUTPATIENT
Start: 2021-01-22 | End: 2021-01-28 | Stop reason: HOSPADM

## 2021-01-22 RX ORDER — ATORVASTATIN CALCIUM 40 MG/1
40 TABLET, FILM COATED ORAL EVERY EVENING
Status: DISCONTINUED | OUTPATIENT
Start: 2021-01-22 | End: 2021-01-22

## 2021-01-22 RX ORDER — FOLIC ACID 1 MG/1
1 TABLET ORAL DAILY
Status: DISCONTINUED | OUTPATIENT
Start: 2021-01-23 | End: 2021-01-28 | Stop reason: HOSPADM

## 2021-01-22 RX ADMIN — FENTANYL CITRATE 25 MCG: 50 INJECTION, SOLUTION INTRAMUSCULAR; INTRAVENOUS at 09:37

## 2021-01-22 RX ADMIN — MAGNESIUM SULFATE HEPTAHYDRATE 2 G: 40 INJECTION, SOLUTION INTRAVENOUS at 18:21

## 2021-01-22 RX ADMIN — LIDOCAINE HYDROCHLORIDE 30 ML: 20 SOLUTION ORAL; TOPICAL at 09:20

## 2021-01-22 RX ADMIN — FENTANYL CITRATE 25 MCG: 50 INJECTION, SOLUTION INTRAMUSCULAR; INTRAVENOUS at 09:34

## 2021-01-22 RX ADMIN — METOPROLOL SUCCINATE 50 MG: 50 TABLET, EXTENDED RELEASE ORAL at 08:17

## 2021-01-22 RX ADMIN — FUROSEMIDE 40 MG: 40 TABLET ORAL at 18:21

## 2021-01-22 RX ADMIN — MIDAZOLAM 1 MG: 1 INJECTION INTRAMUSCULAR; INTRAVENOUS at 09:34

## 2021-01-22 RX ADMIN — LISINOPRIL 5 MG: 5 TABLET ORAL at 08:17

## 2021-01-22 RX ADMIN — POTASSIUM CHLORIDE 20 MEQ: 750 TABLET, EXTENDED RELEASE ORAL at 18:21

## 2021-01-22 RX ADMIN — SODIUM CHLORIDE 150 ML: 9 INJECTION, SOLUTION INTRAVENOUS at 11:01

## 2021-01-22 RX ADMIN — HEPARIN SODIUM 900 UNITS/HR: 10000 INJECTION, SOLUTION INTRAVENOUS at 16:06

## 2021-01-22 RX ADMIN — FUROSEMIDE 40 MG: 40 TABLET ORAL at 11:54

## 2021-01-22 RX ADMIN — MIDAZOLAM 1 MG: 1 INJECTION INTRAMUSCULAR; INTRAVENOUS at 09:37

## 2021-01-22 RX ADMIN — ASPIRIN 81 MG: 81 TABLET, CHEWABLE ORAL at 08:17

## 2021-01-22 RX ADMIN — CEFTRIAXONE 2 G: 2 INJECTION, POWDER, FOR SOLUTION INTRAMUSCULAR; INTRAVENOUS at 11:53

## 2021-01-22 RX ADMIN — METOPROLOL SUCCINATE 50 MG: 50 TABLET, EXTENDED RELEASE ORAL at 19:59

## 2021-01-22 RX ADMIN — MULTIPLE VITAMINS W/ MINERALS TAB 1 TABLET: TAB at 14:07

## 2021-01-22 RX ADMIN — Medication 50 MG: at 08:17

## 2021-01-22 RX ADMIN — FOLIC ACID 1 MG: 1 TABLET ORAL at 08:17

## 2021-01-22 RX ADMIN — TOPICAL ANESTHETIC 0.5 ML: 200 SPRAY DENTAL; PERIODONTAL at 09:28

## 2021-01-22 ASSESSMENT — MIFFLIN-ST. JEOR: SCORE: 1545.26

## 2021-01-22 ASSESSMENT — ACTIVITIES OF DAILY LIVING (ADL)
ADLS_ACUITY_SCORE: 16
ADLS_ACUITY_SCORE: 14
ADLS_ACUITY_SCORE: 14
ADLS_ACUITY_SCORE: 16
ADLS_ACUITY_SCORE: 14
ADLS_ACUITY_SCORE: 14

## 2021-01-22 NOTE — PHARMACY-ADMISSION MEDICATION HISTORY
Admission Medication History Completed by Pharmacy    See [facility] Admission Navigator for allergy information, preferred outpatient pharmacy, prior to admission medications and immunization status.     Medication History Sources:     Patient, Care Everywhere    Changes made to PTA medication list (reason):    Added: None    Deleted: None    Changed: None    Additional Information:    Patient reports that he has not taken any medications since around May 2020. He states that He couldn't afford his medications. Last known medication regimen is below.  o Furosemide 40 mg: Take 1 tablet by mouth every morning  o Metoprolol Succinate 50 mg tablet: Take 1 tablet by mouth twice daily  o Clopidogrel 75 mg tablet: Take 1 tablet by mouth daily  o Apixaban 5 mg tablet: Take 1 tablet by mouth 2 times daily  o Amiodarone 200 mg tablet: Take 1 tablet by mouth daily  o Lisinopril 5 mg tablet: Take 1 tablet by mouth daily  o Atorvastatin 40 mg tablet: Take 1 tablet by mouth daily    Prior to Admission medications    Not on File       Date completed: 01/22/21    Medication history completed by: Rehan Christensen

## 2021-01-22 NOTE — PROGRESS NOTES
Admission    Diagnosis: a-fib RVR, new CHF  Admitted from: [facility]  Via: Stretcher  Accompanied by: self  Belongings: Placed in closet; declined sending any items to security.  Admission Profile: Complete  Teaching: orientation to unit, call don't fall, use of console, meal times, visiting hours, when to call for the RN (angina/sob/dizziness, etc.), and enforced importance of safety   Access: PIV  Telemetry: Placed on patient  Height/Weight: Complete

## 2021-01-22 NOTE — PRE-PROCEDURE
GENERAL PRE-PROCEDURE:   Procedure:  MENDEL  Date/Time:  1/22/2021 9:18 AM    Verbal consent obtained?: Yes    Written consent obtained?: Yes    Risks and benefits: Risks, benefits and alternatives were discussed    Consent given by:  Patient  Patient states understanding of procedure being performed: Yes    Patient's understanding of procedure matches consent: Yes    Procedure consent matches procedure scheduled: Yes    Expected level of sedation:  Minimal  Appropriately NPO:  Yes  ASA Class:  Class 3- Severe systemic disease, definite functional limitations  Mallampati  :  Grade 2- soft palate, base of uvula, tonsillar pillars, and portion of posterior pharyngeal wall visible  Lungs:  Lungs clear with good breath sounds bilaterally  Heart:  Normal heart sounds and rate  History & Physical reviewed:  History and physical reviewed and no updates needed  Statement of review:  I have reviewed the lab findings, diagnostic data, medications, and the plan for sedation

## 2021-01-22 NOTE — UTILIZATION REVIEW
Admission Status; Secondary Review Determination       Under the authority of the Utilization Management Committee, the utilization review process indicated a secondary review on the above patient. The review outcome is based on review of the medical records, discussions with staff, and applying clinical experience noted on the date of the review.     (x) Inpatient Status Appropriate - This patient's medical care is consistent with medical management for inpatient care and reasonable inpatient medical practice.     RATIONALE FOR DETERMINATION   56 yo man with history of alcohol use disorder c/b withdrawal, PE (2019), CAD (s/p PCI to pLAD 2019), afib, and mitral regurgitation transferred from [facility] for management of newly diagnosed biventricular failure and afib with RVR. He was transferred from [facility] (hospitalized inpatient 1/16/21-1/21/21) to higher level of care at the [facility] to the Advanced Heart Failure team. Inpatient care is continued.     At the time of admission with the information available to the attending physician more than 2 nights hospital complex care was anticipated, based on patient risk of adverse outcome if treated as outpatient and complex care required. Inpatient admission is appropriate based on the Medicare guidelines.     This document was produced using voice recognition software.    The information on this document is developed by the utilization review team in order for the business office to ensure compliance. This only denotes the appropriateness of proper admission status and does not reflect the quality of care rendered.   The definitions of Inpatient Status and Observation Status used in making the determination above are those provided in the CMS Coverage Manual, Chapter 1 and Chapter 6, section 70.4.     Sincerely,   Glo Wade MD  Utilization Review  Physician Advisor  [facility].

## 2021-01-22 NOTE — PROGRESS NOTES
Pt arrived in ECHO department for scheduled MENDEL.   Procedure explained, questions answered and consent signed. Discharge instructions discussed with patient.  Pt's throat sprayed at 09:30, therefore pt will not be able to eat or drink until 2 hours after at 11:30. Informed pt of this time and encouraged to start with warm fluids and soft foods.   Pt tolerated procedure well, and was given a total of 50 mcg IV fentanyl and 2 mg IV versed for conscious sedation.    MENDEL probe 61 used for procedure.  Pt denied chest or throat pain after procedure and was monitored until awake and VSS, though BP soft. Map >65 pt denies dizziness. Escorted back to  via stretcher and hospital transport.   Report given to ANAY Head.

## 2021-01-22 NOTE — PROGRESS NOTES
[facility]  CARDIOLOGY HEART FAILURE SERVICE (CARDS II) PROGRESS NOTE    Patient Name: Cordell Adams Jr.    Medical Record Number: [id number]    YOB: 1965  PCP: Becca Morton    Admit Date/Time: 1/21/2021  6:15 PM     Assessment and Plan:    Cordell Adams Jr. is a 55 year old male with history of severe MR 2/2 flail P1 posterior mitral leaflet, CAD (s/p PCI to pLAD in 2019), pAF on Eliquis, alcohol use disorder c/b withdrawal seizures and PE (Nov 2019) transferred from [facility] on 1/21/2021 for management of newly diagnosed cardiomyopathy (LVEF ~15-20%) and surgical consideration of known severe mitral regurgitation.     # Acute decompensated HFrEF, NYHA functional class IV, Stage C  # ICMP & NICM 2/2 alcohol abuse and possible tachycardia induced CM 2/2 Afib w/ RVR  # LV systolic dysfunction (LVEF 15-20%)  # Severe mitral regurgitation  # CAD s/p PCI to pLAD (2019)    MENDEL 1/22/2021 revealed severely reduced LV function (EF 15-20%) w/ severe diffuse hypokinesis; mild RVSD w/ mild dilation; severe MR w/ a flail posterior mitral leaflet (p2 scallop) - ERO 0.61 cm^2 w/ systolic venous reversal in R sided veins. No significant TR present.     From a volume perspective, patient largely euvolemic to only mildly hypervolemic on assessment today. Given 40 IV lasix overnight w/ 1L output (net negative 1.6L since admission).     - Continue PO lasix 40 BID today, to be titrated per clinical assessment.   - Strict input/output, daily weights; Na < 2g, 1500 fluid restriction  - Afterload reduction to decrease extent of MR. Goal BP < 130/80; however, hold lisinopril today given labile hemodynamics.     - From a severe MR perspective, the patient is being assessed by the CT Sx team. Currently, concern over poor liver function (T.Bili 6, D Bili 4.6, AST//240, albumin 2.8) as well as heavy alcohol use, all concerning for cirrhosis. Will check liver US to further  characterize liver morphology, however, cirrhosis may significantly increase surgical risk. If liver enzymes improve & US liver reassuring, the patient will require ischemic evaluation to assess coronary anatomy before proceeding with surgery.    GDMT:   - ACE inhib/ARB/ARNi: holding PTA lisinopril 5mg given labile hemodynamics.   - Beta blocker: continue metoprolol succinate 50 BID (more so for rate control for Afib)  - Semaj antag: hold for now.  - Antiplatelet/statin: On ASA 81 mg; hold lipitor 40 mg given poor liver function.   - Diuretic: lasix 40mg given overnight; resumed PTA 40mg BID.    # Paroxysmal Afib with RVR, currently relatively rate controlled (-110/min)  Rates at OSH 110s-180s recently, likely 2/2 alcohol withdrawal, decompensated heart failure and aspiration pneumonia.     Prior to hospitalization was prescribed Amiodarone 200mg, Metoprolol succinate 50mg [but patient was not taking]. Has failed cardioversion x 2 in past. CHADSVASC is 2 (CHF, MI) FT4 was elevated at 1.78 (1.46 ULN), though difficult to interpret in acute setting. Hypotensive with 5mg IV metoprolol in ED at OSH. Has been treated with bolus diltiazem and also gtt but most recently metoprolol 75mg BID and digoxin 125mcg prior to transfer    - Will target a rate control strategy for now, given severe MR & dilated LA & active Rx for aspiration PNA.   - Currently on metoprolol 50 mg BID for rate control.   - Hold digoxin (level 1.9 this AM)  - Continue anticoagulation w/ heparin gtt.       # Alcoholic hepatitis  # Direct hyperbilirubinemia  # Coagulopathy  Hepatocellular injury pattern likely related to alcohol use, but may a have some component of hepatic congestion due to HF. 1/20/21 OSH RUQ US Coarsened heterogeneous echotexture of the hepatic parenchyma with increased echogenicity, suggesting hepatic steatosis. No surface contour nodularity or solid mass. Hepatitis panel, HIV negative.    - Recheck RUQ US.  - Trend LFTs  - Will  consider GI consult.     # Alcohol use disorder  EtOH 168 mg/dL at OSH. Last drink was 1/16. Requiring PO diazepam at OSH for withdrawal. CD consult at OSH offered counseling and resource information. Scoring 1, 2 on CIWA prior to transfer, per notes. No benzos given 1/21 prior to transfer.   - CIWA protocol for scoring only, notify team if scoring high.  - Thiamine/folate     # Acute hypoxic respiratory failure, resolved  Per chart, developed this in hospital. Likely related to fluid overload given resuscitation in ED and maintenance fluids to treat CHRISTOPHER. Improved with lasix gtt and patient was weaned to room air on 1/18. Procalcitonin was elevated 1/19 and he was started on ceftriaxone for possible aspiration pneumonia. At this point, he is not having infectious symptoms but he did have reports of fevers, cough, nausea PTA. Will continue for 2 more days to complete a 5 day course.   - Ceftriaxone 2g q24 1/19-1/23     # Upper extremity blood pressure differential  Notified by RN overnight that the patient has had a persistent discrepancy in BP readings: L arm is consistently <20mmHg compared with right. Patient asymptomatic. Initial BP measurements in L arm, however, were WNL. Given his history of CAD, do think he's at increased risk for PAD. Reasonable to ultrasound.   - BUE arterial duplex pending.    # Prediabetes   A1c 5.7 at OSH. Was hyperglycemic intermittently.   - BID, HS BG; if high, can consider sliding scale insulin (was on this at OSH)     # Hypervolemic hyponatremia, improving  129 on admission, 135 prior to transfer. Will continue to monitor.     # Hx PE  Diagnosis in 2018. Noncompliance with apixaban due to cost. Was started on heparin gtt at OSH due to high suspicion for PE (d-dimer elevated but CTPA not performed due to CHRISTOPHER). At this point, do not have a high suspicion for PE.   - Anticoagulation for afib as above  - Likely resume apixaban at discharge if low cost can be confirmed     # CHRISTOPHER,  resolved  Cr was 2 1/16, baseline of 1. Likely 2/2 cardiorenal in light of the above. Improving. UA from OSH unremarkable.   - Continue to monitor    # R Anterior chest wall sebaceous cyst  Pt was seen in 02/2020 to discuss mitral valve repair, and at that time, was referred to general surgery for removal of an anterior chest wall sebaceous cyst. cyst has been present for 12-15 years, and has enlarged slowly over time. Was planning to have cyst removed by general surgery 1/20 with MAC after holding heparin gtt 6 hrs. But this was cancelled in light of transfer  - Consider general surgery consult        Diet: CCH/2g Na/1500mL fluid restriction  DVT Prophylaxis: On therapeutic AC  Turner Catheter: not present  Code Status: Full  Fluids: 1500 fluid restriction  Lines: peripheral IVs    Pt was discussed and evaluated with Dr. Mauro MD, attending physician, who agrees with the assessment and plan above.     Symone Coon MD,   Cardiovascular Disease Fellow  Pager [phone]    Interval Changes in Past 24 Hours:   - A.fib largely rate controlled overnight.  - Pt slightly anxious; otherwise no s/s of withdrawal.  - Breathing comfortably on RA, denies active cardiac symptoms.    Review Of Systems  A 4-point ROS was negative aside from those listed above.    OBJECTIVE FINDINGS:    Temp:  [97.7  F (36.5  C)-98.2  F (36.8  C)] 97.8  F (36.6  C)  Pulse:  [] 108  Resp:  [14-18] 16  BP: ()/(47-90) 103/83  SpO2:  [96 %-100 %] 98 %    Gen: Patient is awake and alert, NAD. Appears comfortable.    HEENT: PERRLA, EOMI, MMM  Neck: JVP estimated at 7-8 cm.  Chest: large sebaceous cyst noted on chest wall.   Resp: clear to auscultation bilaterally, no crackles or wheezing   CV: RRR, no murmurs appreciated  Abd: soft, NT, ND, no hepatosplenomegaly, normal BS  Ext: warm and well perfused, no LE edema    Intake/Output Summary (Last 24 hours) at 1/22/2021 1503  Last data filed at 1/22/2021 1400  Gross per 24 hour   Intake  629.65 ml   Output 2275 ml   Net -1645.35 ml     Wt Readings from Last 5 Encounters:   01/22/21 74.2 kg (163 lb 8 oz)

## 2021-01-22 NOTE — CONSULTS
Cardiothoracic Surgery   History and Physical     Cordell Adams Jr.   1965   MRN: [id number]           Cardiothoracic Consult Note   Reason for Consult: Heart failure and severe MR         Assessment and Plan:     Cordell Adams Jr. is a 55 year old male with a history of A-fib RVR and newly diagnosed ICM with biventricular failure. History of alcohol use disorder c/b withdrawal, PE (2019), CAD (s/p PCI to pLAD 2019), Afib, and severe mitral regurgitation.     - Continue VAD/Transplant/Valve workup per Cards II  - Await further imaging studies, CT chest/abd/pelvis today.  - Consider GI Hepatology consult due to increased coagulopathy surgical risk, Hx EtOH use, and hyperbilirubinemia  - Coronary angiogram and Dental Consult if moving towards valve repair/replace of valve.   - Large R chest wall cyst may complicate sternotomy and sternal retractor placement.     Surgeon: Dr Jose Mccartney  Surgical Plan: to be determined  Primary care provider: Becca Morton            History of Present Illness:     Cordell Adams Jr. is a 55 year old male with a history of CAD and LAD stent at [facility] in April 2019 (on plavix) and right lung PE in Nov 2019 (on Eliquis), severe MR with new reduced EF.   Has reduced hunger and weight loss over past few weeks. Has noticed more SOB with ADLs and has a new cough. Hx right chest wall cyst present for years.         Home Meds:  No medications prior to admission.       Allergies:  No Known Allergies    PMH:  No past medical history on file.    PSH:  No past surgical history on file.    FH:  No family history on file.    SH:  Social History     Socioeconomic History     Marital status: Single     Spouse name: Not on file     Number of children: Not on file     Years of education: Not on file     Highest education level: Not on file   Occupational History     Not on file   Social Needs     Financial resource strain: Not on file     Food insecurity     Worry: Not on file      Inability: Not on file     Transportation needs     Medical: Not on file     Non-medical: Not on file   Tobacco Use     Smoking status: Not on file   Substance and Sexual Activity     Alcohol use: Not on file     Drug use: Not on file     Sexual activity: Not on file   Lifestyle     Physical activity     Days per week: Not on file     Minutes per session: Not on file     Stress: Not on file   Relationships     Social connections     Talks on phone: Not on file     Gets together: Not on file     Attends Hinduism service: Not on file     Active member of club or organization: Not on file     Attends meetings of clubs or organizations: Not on file     Relationship status: Not on file     Intimate partner violence     Fear of current or ex partner: Not on file     Emotionally abused: Not on file     Physically abused: Not on file     Forced sexual activity: Not on file   Other Topics Concern     Not on file   Social History Narrative     Not on file          Review of Systems:   No fevers, chills, or night sweats.  No new visual or hearing complaints. No sore throat or nasal congestion.  No CP, palpitations, syncopal episodes, or dependent edema. No new abdominal pain, nausea, emesis, diarrhea, or constipation.  No new muscle or joint pain.  No weakness, numbness, or tingling of extremities. No new headaches. No changes in memory, mood, or affect.  No new rashes or bruises.          Physical Exam:   Temp:  [97.7  F (36.5  C)-98.2  F (36.8  C)] 97.8  F (36.6  C)  Pulse:  [] 108  Resp:  [14-18] 16  BP: ()/(47-90) 103/83  SpO2:  [96 %-100 %] 98 %  Gen: NAD, resting comfortably in bed, conversational  Skin: no rashes or bruises, warm, overall a little dry  HEENT: normocephalic, atraumatic cranium, EOMI, sclerae are yellow bilaterally. Oral mucosa pink and moist, poor dentition with some missing teeth, no tonsillar edema or erythema, midline trachea, nonpalpable thyroid  Lungs: CTA in all fields, no wheezing or  rhonchi  CV: irregularly irregular, S1S2 normal, systolic murmur. Radial pulses and DP pulses symmetric. No dependent edema.   Abd: no scars, positive normal pitched bowel sounds, overall soft and non distended, nontender, no hepatosplenomegaly, no masses/guarding/rebound tenderness.   Musculoskeletal: grossly intact, strength 5/5 upper and lower extremities  Neuro: AOx3, CN II-VII grossly intact, sensation/motor intact in upper and lower extremities  Mental: normal mood and affect, regular rate of speech           LABS:     BMP  Recent Labs   Lab 01/22/21  0318 01/21/21  1845    132*   POTASSIUM 3.6 4.2   CHLORIDE 101 101   BRAD 9.1 8.8   CO2 24 24   BUN 35* 34*   CR 1.11 1.15   * 115*     CBC  Recent Labs   Lab 01/21/21  2152 01/21/21  1845   WBC 5.6 6.5   RBC 4.66 4.96   HGB 16.0 16.8   HCT 45.9 48.3   MCV 99 97   MCH 34.3* 33.9*   MCHC 34.9 34.8   RDW 15.9* 15.7*    183     INR  Recent Labs   Lab 01/21/21  1845   INR 1.23*      Liver Function Studies -   Recent Labs   Lab Test 01/22/21 0318   PROTTOTAL 6.2*   ALBUMIN 2.8*   BILITOTAL 5.5*   ALKPHOS 159*   *   *            Imaging:     Reviewed         Problem List:     Patient Active Problem List   Diagnosis     Acute decompensated heart failure (H)       Thank you for the opportunity to participate in the care of Cordell Adams Jr..    Patient and plan discussed with attending.      Toy Sepulveda PA-C  Cardiothoracic Surgery  Pager [phone]    11:46 AM   January 22, 2021

## 2021-01-22 NOTE — PLAN OF CARE
Admitted 1/21 for a fib RVR and newly diagnosed biventricular failure. History of alcohol use disorder c/b withdrawal, PE (2019), CAD (s/p PCI to pLAD 2019), afib, and mitral regurgitation.    Neuro: A&O x 4. CIWA score of 2 for paroxysmal sweats.  Cardiac: A fib, rates 90's-110's. BP's on left arm significantly lower than BP's on right arm, providers aware, left arm ultrasound ordered for today to assess for PAD.  Respiratory: Room air.  GI/: 40mg IV lasix given with good response. LBM 1/21.  Diet: NPO.  Skin: Right chest cyst, was supposed to have removed 1/20 but pushed back due to hospitalization.  LDAs: R PIV. L PIV with heparin infusing at 900 units/hr, 10a therapeutic x 1 overnight, next draw at 0945.   Activity: SBA.  Pain: Denies.     Plan: Continue to monitor. MENDEL today. CVTS consulted for severe mitral regurgitation.

## 2021-01-23 PROBLEM — I34.0 NONRHEUMATIC MITRAL VALVE REGURGITATION: Status: ACTIVE | Noted: 2021-01-19

## 2021-01-23 LAB
ALBUMIN SERPL-MCNC: 2.7 G/DL (ref 3.4–5)
ALBUMIN SERPL-MCNC: NORMAL G/DL (ref 3.4–5)
ALBUMIN UR-MCNC: NEGATIVE MG/DL
ALP SERPL-CCNC: 169 U/L (ref 40–150)
ALP SERPL-CCNC: NORMAL U/L (ref 40–150)
ALT SERPL W P-5'-P-CCNC: 188 U/L (ref 0–70)
ALT SERPL W P-5'-P-CCNC: NORMAL U/L (ref 0–70)
ANION GAP SERPL CALCULATED.3IONS-SCNC: 8 MMOL/L (ref 3–14)
APPEARANCE UR: CLEAR
AST SERPL W P-5'-P-CCNC: 92 U/L (ref 0–45)
AST SERPL W P-5'-P-CCNC: NORMAL U/L (ref 0–45)
BILIRUB DIRECT SERPL-MCNC: NORMAL MG/DL (ref 0–0.2)
BILIRUB SERPL-MCNC: 4.2 MG/DL (ref 0.2–1.3)
BILIRUB SERPL-MCNC: NORMAL MG/DL (ref 0.2–1.3)
BILIRUB UR QL STRIP: ABNORMAL
BUN SERPL-MCNC: 35 MG/DL (ref 7–30)
CALCIUM SERPL-MCNC: 8.7 MG/DL (ref 8.5–10.1)
CHLORIDE SERPL-SCNC: 101 MMOL/L (ref 94–109)
CO2 SERPL-SCNC: 22 MMOL/L (ref 20–32)
COLOR UR AUTO: ABNORMAL
CREAT SERPL-MCNC: 1.06 MG/DL (ref 0.66–1.25)
EBV VCA IGG SER QL IA: 4.7 AI (ref 0–0.8)
GFR SERPL CREATININE-BSD FRML MDRD: 80 ML/MIN/{1.73_M2}
GLUCOSE BLDC GLUCOMTR-MCNC: 100 MG/DL (ref 70–99)
GLUCOSE BLDC GLUCOMTR-MCNC: 141 MG/DL (ref 70–99)
GLUCOSE BLDC GLUCOMTR-MCNC: 158 MG/DL (ref 70–99)
GLUCOSE SERPL-MCNC: 103 MG/DL (ref 70–99)
GLUCOSE UR STRIP-MCNC: NEGATIVE MG/DL
HGB UR QL STRIP: NEGATIVE
KETONES UR STRIP-MCNC: NEGATIVE MG/DL
LEUKOCYTE ESTERASE UR QL STRIP: NEGATIVE
MAGNESIUM SERPL-MCNC: 2.3 MG/DL (ref 1.6–2.3)
MAGNESIUM SERPL-MCNC: NORMAL MG/DL (ref 1.6–2.3)
MUCOUS THREADS #/AREA URNS LPF: PRESENT /LPF
NITRATE UR QL: NEGATIVE
PH UR STRIP: 6 PH (ref 5–7)
POTASSIUM SERPL-SCNC: 4.2 MMOL/L (ref 3.4–5.3)
POTASSIUM SERPL-SCNC: NORMAL MMOL/L (ref 3.4–5.3)
PROT SERPL-MCNC: 6.2 G/DL (ref 6.8–8.8)
PROT SERPL-MCNC: NORMAL G/DL (ref 6.8–8.8)
RBC #/AREA URNS AUTO: 0 /HPF (ref 0–2)
SODIUM SERPL-SCNC: 131 MMOL/L (ref 133–144)
SOURCE: ABNORMAL
SP GR UR STRIP: 1.02 (ref 1–1.03)
SQUAMOUS #/AREA URNS AUTO: <1 /HPF (ref 0–1)
UFH PPP CHRO-ACNC: 0.23 IU/ML
UFH PPP CHRO-ACNC: 0.49 IU/ML
UFH PPP CHRO-ACNC: 0.5 IU/ML
UROBILINOGEN UR STRIP-MCNC: 2 MG/DL (ref 0–2)
WBC #/AREA URNS AUTO: 1 /HPF (ref 0–5)

## 2021-01-23 PROCEDURE — 99233 SBSQ HOSP IP/OBS HIGH 50: CPT | Mod: GC | Performed by: INTERNAL MEDICINE

## 2021-01-23 PROCEDURE — 250N000013 HC RX MED GY IP 250 OP 250 PS 637: Performed by: STUDENT IN AN ORGANIZED HEALTH CARE EDUCATION/TRAINING PROGRAM

## 2021-01-23 PROCEDURE — 250N000011 HC RX IP 250 OP 636: Performed by: STUDENT IN AN ORGANIZED HEALTH CARE EDUCATION/TRAINING PROGRAM

## 2021-01-23 PROCEDURE — 83735 ASSAY OF MAGNESIUM: CPT | Performed by: INTERNAL MEDICINE

## 2021-01-23 PROCEDURE — 81001 URINALYSIS AUTO W/SCOPE: CPT | Performed by: STUDENT IN AN ORGANIZED HEALTH CARE EDUCATION/TRAINING PROGRAM

## 2021-01-23 PROCEDURE — 85520 HEPARIN ASSAY: CPT | Performed by: INTERNAL MEDICINE

## 2021-01-23 PROCEDURE — 80053 COMPREHEN METABOLIC PANEL: CPT | Performed by: INTERNAL MEDICINE

## 2021-01-23 PROCEDURE — 214N000001 HC R&B CCU UMMC

## 2021-01-23 PROCEDURE — 999N001017 HC STATISTIC GLUCOSE BY METER IP

## 2021-01-23 PROCEDURE — 36415 COLL VENOUS BLD VENIPUNCTURE: CPT | Performed by: INTERNAL MEDICINE

## 2021-01-23 PROCEDURE — 82977 ASSAY OF GGT: CPT | Performed by: STUDENT IN AN ORGANIZED HEALTH CARE EDUCATION/TRAINING PROGRAM

## 2021-01-23 PROCEDURE — 86665 EPSTEIN-BARR CAPSID VCA: CPT | Performed by: INTERNAL MEDICINE

## 2021-01-23 RX ORDER — POTASSIUM CHLORIDE 750 MG/1
20 TABLET, EXTENDED RELEASE ORAL
Status: DISCONTINUED | OUTPATIENT
Start: 2021-01-23 | End: 2021-01-25 | Stop reason: HOSPADM

## 2021-01-23 RX ORDER — POTASSIUM CHLORIDE 750 MG/1
40 TABLET, EXTENDED RELEASE ORAL
Status: DISCONTINUED | OUTPATIENT
Start: 2021-01-23 | End: 2021-01-25 | Stop reason: HOSPADM

## 2021-01-23 RX ORDER — MAGNESIUM SULFATE HEPTAHYDRATE 40 MG/ML
2 INJECTION, SOLUTION INTRAVENOUS
Status: DISCONTINUED | OUTPATIENT
Start: 2021-01-23 | End: 2021-01-25 | Stop reason: HOSPADM

## 2021-01-23 RX ADMIN — HEPARIN SODIUM 1050 UNITS/HR: 10000 INJECTION, SOLUTION INTRAVENOUS at 15:36

## 2021-01-23 RX ADMIN — METOPROLOL SUCCINATE 50 MG: 50 TABLET, EXTENDED RELEASE ORAL at 20:06

## 2021-01-23 RX ADMIN — ASPIRIN 81 MG: 81 TABLET, CHEWABLE ORAL at 08:43

## 2021-01-23 RX ADMIN — THIAMINE HCL TAB 100 MG 100 MG: 100 TAB at 08:43

## 2021-01-23 RX ADMIN — FUROSEMIDE 40 MG: 40 TABLET ORAL at 08:43

## 2021-01-23 RX ADMIN — MULTIPLE VITAMINS W/ MINERALS TAB 1 TABLET: TAB at 08:43

## 2021-01-23 RX ADMIN — METOPROLOL SUCCINATE 50 MG: 50 TABLET, EXTENDED RELEASE ORAL at 08:43

## 2021-01-23 RX ADMIN — FOLIC ACID 1 MG: 1 TABLET ORAL at 08:43

## 2021-01-23 RX ADMIN — CEFTRIAXONE 2 G: 2 INJECTION, POWDER, FOR SOLUTION INTRAMUSCULAR; INTRAVENOUS at 08:42

## 2021-01-23 ASSESSMENT — MIFFLIN-ST. JEOR: SCORE: 1555.63

## 2021-01-23 ASSESSMENT — ACTIVITIES OF DAILY LIVING (ADL)
ADLS_ACUITY_SCORE: 16

## 2021-01-23 NOTE — PLAN OF CARE
D: Admitted 1/21 from OSH for management of newly diagnosed CM and surgical consideration of severe MR. Hx of severe MR 2/2 flail P1 posterior mitral leaflet, CAD (s/p PCI 2019), pAF on Eliquis, alcohol use disorder c/b withdrawal seizures, and PE (Nov 2019).     I: Monitored vitals and assessed pt status.   Changed:  Running: Heparin 900 units/hr; TKO   PRN:    A: A0x4. Able to express needs. Slept most of night. Afib 80s-100s. Blood pressures vary on each arm. MAPs above 65 in both arms. Afebrile. Urinating adequately. Independent in room. 2g Na+ and 1.5 L fluid restriction. CIWA q4, ranging between 0-1. No pain. Denies SOB. Jaundice in both sclera. Right chest cyst intact. Last BM 1/22.     P: Continue to monitor Pt status and report changes to Cards 2. Continue workup for valve/LVAD/transplant.

## 2021-01-23 NOTE — PLAN OF CARE
D: Pt who presents from OSH for further management of newly diagnosed CM (EF~15-20%) and surgical consideration of known severe MR. Hx of severe MR 2/2 flail P1 posterior mitral leaflet, CAD s/p PCI (2019), pAF on Eliquis, alcohol use disorder c/b withdrawal seizures and PE (Nov 2019).     I/A: Pt alert and oriented x4. CIWA ordered and done q4hrs, scores 0-1. Suicidal risk screen/assessment also done 1x per orders; pt denies any suicidal ideation. Pt A.Fib with HRs 90-120s. Heparin gtt continued therapeutically at 900 units/hr. On RA with O2 sats >92%. Pt denies any pain, lightheadedness or dizziness today. Appetite good, denies nausea. BG taken with/after lunch was 200 (for assessment). Last BM today. PO lasix given, pt voiding. Up independently in room. IV abx given as scheduled. K+ and magnesium this afternoon 3.8 and 2.0 respectively; both replaced per protocol. Next check tomorrow morning. US of abdomen and LUE done at the bedside. Pt went down for MENDEL and CT of chest, abdomen and pelvis.     P: Continue VAD/Transplant/Valve workup. IContinue to monitor pt with every encounter. Notify Cards 2 with any changes or concerns.

## 2021-01-23 NOTE — PROGRESS NOTES
[facility]     Cardiology Progress Note- Cards 2        Date of Admission:  1/21/2021     Assessment & Plan: S        Cordell Adams Jr. is a 55 year old male with history of severe MR 2/2 flail P1 posterior mitral leaflet, CAD (s/p PCI to pLAD in 2019), pAF on Eliquis, alcohol use disorder c/b withdrawal seizures, and PE (Nov 2019) transferred from [facility] on 1/21/2021 for management of newly diagnosed cardiomyopathy (LVEF ~15-20%) and surgical consideration of known severe mitral regurgitation.     Today:  - holding PM lasix dose  - check UA   - plan for Monday 1/25; cardioversion followed by limited echo, RHC/LHC      # Acute decompensated HFrEF, NYHA functional class IV, Stage C  # ICMP & NICM 2/2 alcohol abuse and possible tachycardia induced CM 2/2 Afib w/ RVR  # LV systolic dysfunction (LVEF 15-20%)  # CAD s/p PCI to pLAD (2019)  ADHFrEF likely non-ischemic 2/2 alcohol vs tachycardic induced vs valvular.   - Last MENDEL 1/22/202:  severely reduced LV function (EF 15-20%) w/ severe diffuse hypokinesis; mild RVSD w/ mild dilation; severe MR w/ a flail posterior mitral leaflet (p2 scallop) - ERO 0.61 cm^2 w/ systolic venous reversal in R sided veins. No significant TR present.   - Fluid status: appears euvolemic to mildly hypovolemic.   - Diuresis: PO lasix 40mg (hold evening dose)  - Ionotrope: none  - Afterload reduction: holding PTA ACEi given borderline MAPs  - Beta blocker: Toprol XL 50 BID  - Aldosterone antagonist: holding for now   - Antiplatelet/statin: On ASA 81 mg; hold lipitor 40 mg given poor liver function.  - SCD prophylaxis: none  - Mechanical support: none  - Strict I/Os  - Daily weights  - 2L/2gm Na restricted diet  - Follow BMP to keep K>4, Mg >2 with lyte replacement protocol    - Plan for RHC, LHC and limited repeat echo on Monday following cardioversion     # Severe mitral regurgitation  2/2 flail posterior mitral leaflet.  ERO 0.61 cm^2 w/ systolic venous reversal in R sided veins.  Planned previously for surgical repair following large subcutaneous cyst removal however lost to follow up. It is unclear currently if he would tolerate complete repair given markedly reduced LVEF and I query if mitral clip would be a better option. Will rediscuss with [facility] on Monday following  Cardioversion and follow up echo. Would need CA angio prior to repair.   -  [facility] team following   - RHC and LHC on Monday     # Paroxysmal Afib with RVR, currently relatively rate controlled (-110/min)  Rates at OSH 110s-180s recently, likely 2/2 alcohol withdrawal, decompensated heart failure and aspiration pneumonia.  Prior to hospitalization was prescribed Amiodarone 200mg, Metoprolol succinate 50mg [but patient was not taking]. Has failed cardioversion x 2 in past. CHADSVASC is 2 (CHF, MI) FT4 was elevated at 1.78 (1.46 ULN), though difficult to interpret in acute setting. Hypotensive with 5mg IV metoprolol in ED at OSH. Has been treated with bolus diltiazem and also gtt but most recently metoprolol 75mg BID and digoxin 125mcg prior to transfer. Currently relatively rate controlled with beta blockade. Repeat echo without evidence of thrombus.   - Continue rate control with metoprolol 50 mg BID   - Holding off on digoxin (level 1.9 this AM)  - Continue anticoagulation w/ heparin gtt.   - Plan for cardioversion on Monday   - Limited echo following cardioversion to reassess EF and help determine MR repair plan      # Alcoholic hepatitis  # Direct hyperbilirubinemia  # Coagulopathy  Hepatocellular injury pattern likely related to alcohol use likely compounded by congestive hepatopathy. Abdominal US with hepatic steatosis but no contour nodularity or solid mass to suggest cirrhosis or HCC. Hepatitis panel, HIV negative. LFTs downtrending with diuresis.   - Daily CMP  - HF management as above      # Alcohol use disorder  EtOH 168 mg/dL at OSH. Last drink was  1/16. Requiring PO diazepam at OSH for withdrawal. CD consult at OSH offered counseling and resource information. No longer appears to be going through withdrawal. Last Benzos on 1/22 AM.    - CIWA protocol for scoring only, notify team if scoring high.  - Thiamine/folate      # Acute hypoxic respiratory failure, resolved  Per chart, developed this in OSH. Likely related to fluid overload given resuscitation in ED and maintenance fluids to treat CHRISTOPHER. Improved with lasix gtt and patient was weaned to room air on 1/18. Procalcitonin was elevated 1/19 and he was started on ceftriaxone for possible aspiration pneumonia. At this point, he is not having infectious symptoms but he did have reports of fevers, cough, nausea PTA. Will continue for 1 more day to complete a 5 day course.   - Ceftriaxone 2g q24 1/19-1/23     # Upper extremity blood pressure differential  Notified by RN that the patient has had a persistent discrepancy in BP readings: L arm is consistently <20mmHg compared with right. Patient asymptomatic. Initial BP measurements in L arm, however, were WNL. BUE arterial duplex without evidence of PAD.      # Prediabetes   A1c 5.7 at OSH. Was hyperglycemic intermittently.   - BID, HS BG; if high, can consider sliding scale insulin (was on this at OSH)     # Hypervolemic hyponatremia, improving  129 on admission, 135 prior to transfer. Will continue to monitor.  - Diuresis as above      # Hx PE  Diagnosed in 2018. Noncompliance with apixaban due to cost. Was started on heparin gtt at OSH due to high suspicion for PE (d-dimer elevated but CTPA not performed due to CHRISTOPHER). At this point, do not have a high suspicion for PE.   - Anticoagulation for afib as above  - Likely resume apixaban at discharge if low cost can be confirmed     # CHRISTOPHER, resolved  Cr was 2 1/16, baseline of 1. Likely 2/2 cardiorenal in light of the above. Improving. UA from OSH unremarkable. Noted to have concentrated, malodorous urine by RN today.  No dysuria, hematuria or fevers/chills.   - Repeat UA      # R Anterior chest wall sebaceous cyst  Pt was seen in 02/2020 to discuss mitral valve repair, and at that time, was referred to general surgery for removal of an anterior chest wall sebaceous cyst. cyst has been present for 12-15 years, and has enlarged slowly over time. Was planning to have cyst removed by general surgery 1/20 with MAC after holding heparin gtt 6 hrs. But this was cancelled in light of transfer  - [facility] aware                                                                                                                                                                                                                                            DVT Prophylaxis: On therapeutic AC with UFH  Turner Catheter: not present  Code Status: Full  Lines: peripheral IVs    Disposition Plan   Expected discharge: > 7 days, recommended to transitional care unit once advanced heart failure work-up completed.      Entered: Sarkis Solo MD 01/23/2021, 12:24 PM        The patient's care was discussed with the Attending Physician, Dr. Leny Wade .    Sarkis Solo MD  [facility]   Please see sign in/sign out for up to date coverage information  ______________________________________________________________________    Interval History   Nursing notes reviewed. NAEO. CIWA 0-1. No benzos needed. Not complaints this morning. Denies any visual/auditory hallucinations, tremors, anxiety, diaphoresis.     Data reviewed today: I reviewed all medications, new labs and imaging results over the last 24 hours. I personally reviewed     Physical Exam   Vital Signs: Temp: 97.9  F (36.6  C) Temp src: Oral BP: (!) 87/49(comparing with LU BP) Pulse: 105   Resp: 16 SpO2: 98 % O2 Device: None (Room air)    Weight: 165 lbs 12.57 oz  General Appearance: Comfortable in bed, NAD  HEENT: mild b/l conjunctival icterus.   Respiratory:  CTA b/l  Cardiovascular: irregularly irregular, 3/6 systolic murmur, no JVD, no peripheral edema   GI: abdomen is soft, NTND  Skin: no concerning rashes or lesions   Neuro: A&Ox3, no asterixis, no tremors     Data   Recent Labs   Lab 01/23/21  0548 01/23/21  0547 01/22/21  1604 01/22/21  0318 01/21/21  2152 01/21/21  1845   WBC  --   --   --   --  5.6 6.5   HGB  --   --   --   --  16.0 16.8   MCV  --   --   --   --  99 97   PLT  --   --   --   --  166 183   INR  --   --   --   --   --  1.23*   *  --  131* 133  --  132*   POTASSIUM 4.2 Duplicate request 3.8 3.6  --  4.2   CHLORIDE 101  --  98 101  --  101   CO2 22  --  23 24  --  24   BUN 35*  --  34* 35*  --  34*   CR 1.06  --  1.24 1.11  --  1.15   ANIONGAP 8  --  10 8  --  8   BRAD 8.7  --  8.8 9.1  --  8.8   *  --  178* 114*  --  115*   ALBUMIN 2.7* Duplicate request  --  2.8*  --  2.7*   PROTTOTAL 6.2* Duplicate request  --  6.2*  --  6.1*   BILITOTAL 4.2* Duplicate request  --  5.5*  --  6.1*   ALKPHOS 169* Duplicate request  --  159*  --  161*   * Duplicate request  --  226*  --  242*   AST 92* Duplicate request  --  114*  --  143*     Recent Results (from the past 24 hour(s))   CT Chest Abdomen Pelvis w/o Contrast    Narrative    EXAMINATION: CT CHEST ABDOMEN PELVIS W/O CONTRAST, 1/22/2021 1:03 PM    TECHNIQUE:  Helical CT images from the thoracic inlet through the  symphysis pubis were obtained  without contrast.     COMPARISON: None    HISTORY: Workup for heart transplant versus LVAD    FINDINGS:    Chest: Apparent gland is normal. Cardiac size is enlarged. No  pericardial effusion. Normal caliber of the aorta and main pulmonary  artery. Conventional branching pattern of the aortic arch. Moderate  coronary artery calcium. No significant atherosclerotic changes of the  thoracic aorta. No axillary or thoracic lymphadenopathy.    The central tracheobronchial tree is patent. Mild bibasilar  atelectasis/scarring. No pleural effusion or  pneumothorax. No focal  pulmonary infiltrate. No suspicious pulmonary nodules or masses.    Abdomen and pelvis:   Hypoattenuation of the hepatic parenchyma. Subcentimeter hypodensity  in ectatic segment 6, too small accurately characterize by CT. The  gallbladder biliary system, pancreas, spleen, and right adrenal gland  are normal in appearance. 12 mm nodule in the left adrenal gland with  average Hounsfield units of less than 10, compatible with benign  adrenal adenoma. Normal noncontrast appearance of the kidneys. No  renal calculi or hydronephrosis. Urinary bladder is within normal  limits. Two small diverticula arising off the posterior bladder wall.  Prostate is normal size with coarse calcifications.    No abnormally dilated loops of small or large bowel. The appendix is  normal. No free fluid or free air in the abdomen. Normal caliber of  the abdominal aorta. No suspicious abdominal or pelvic  lymphadenopathy.    Bones and soft tissues: Small fat-containing umbilical hernia. 5.1 x  3.6 cm cystic lesion anterior to the pectoralis musculature in the  right chest wall. Additional smaller cystic lesion in the posterior  right subcutaneous tissue measuring 2.3 x 1.8 cm 10 (series 2, image  15). Mild degenerative changes of the spine. Degenerative changes of  the hips. No suspicious osseous lesions.      Impression    IMPRESSION:  1. Cardiomegaly with moderate coronary artery calcium in the left  anterior descending artery.  2. Left adrenal adenoma.  3. Two subcutaneous cysts in the right chest wall and right upper  back.     I have personally reviewed the examination and initial interpretation  and I agree with the findings.    RL MONAHAN MD   US Abdomen Limited    Addendum: 1/22/2021    Please disregard resident notes at the beginning of the official  dictation.    JAILENE WEAVER MD      Narrative    just had a ct, liver looks fatty, there is a little cyst, little  biliary sludge, nothing else  notable    EXAMINATION: Limited Abdominal Ultrasound, 1/22/2021 2:34 PM     COMPARISON: CT chest abdomen pelvis dated 1/22/2021    HISTORY: Assess for cirrhosis    FINDINGS:   Fluid: No evidence of ascites or pleural effusions.    Liver: The liver demonstrates diffuse slightly hyperechoic parenchyma  with homogenous echotexture, measuring 16.3 cm in craniocaudal  dimension. There is no focal mass. There is a anechoic simple hepatic  cyst located in the posterior aspect of the liver segment 6 and  measures approximately 0.8 x 0.9 x 1.0 cm.    Gallbladder: There is moderate volume of biliary sludge with no wall  thickening, pericholecystic fluid, positive sonographic Doss's sign  or evidence for cholelithiasis or cholecystitis.    Bile Ducts: Both the intra- and extrahepatic biliary system are of  normal caliber.  The common bile duct measures 3 mm in diameter.    Pancreas: Visualized portions of the head and body of the pancreas are  unremarkable.     Kidney: The right kidney measures 10.0 cm long. There is no  hydronephrosis or hydroureter, no shadowing renal calculi, cystic  lesion or mass.         Impression    IMPRESSION:   1.  Liver parenchyma shows diffuse hyperechoic echotexture, favoring  chronic changes consistent with hepatic steatosis without evidence of  aide cirrhosis.    2.  There is moderate biliary sludge without evidence of cholecystitis  or cholelithiasis.    3.  Solitary benign simple hepatic cyst located in the posterior  aspect of hepatic segment 6 that measures 0.8 x 0.9 x 1.0 cm. No other  focal lesions or masses.    I have personally reviewed the examination and initial interpretation  and I agree with the findings.    JAILENE WEAVER MD   US Upper Ext Arterial Duplex Bilateral    Narrative    ULTRASOUND UPPER EXTREMITY ARTERIAL DUPLEX BILATERAL 1/22/2021    CLINICAL HISTORY: blood pressure differential > 20mmHg, assess for  PAD.     COMPARISONS: CT chest abdomen pelvis without contrast  1/22/2021    REFERRING PROVIDER: CHRISTOPHER PAGAN    TECHNIQUE: Bilateral central and arm arteries evaluated with  grayscale, color Doppler, and Doppler waveform ultrasound. Cine clip  through the right innominate artery was saved in the patient's record.    FINDINGS: Arrhythmia.    RIGHT:       Innominate artery: 92/0 cm/s, triphasic       Common carotid artery, proximal: 117/17 cm/s, triphasic         Subclavian artery, medial: 121/0 cm/s, triphasic       Subclavian artery, mid: 81 cm/s, triphasic         Axillary artery: 88 cm/s, triphasic         Brachial artery, proximal: 97/0 cm/s, triphasic       Brachial artery, mid: 88/0 cm/s, triphasic       Brachial artery, antecubital fossa: 90/0 cm/s, triphasic         Radial artery, origin: 64/0 cm/s, triphasic       Radial artery, wrist: 60/6 cm/s, triphasic         Ulnar artery, origin: 63/0 cm/s, triphasic       Ulnar artery, wrist: 49/0 cm/s, triphasic    LEFT:       Common carotid artery, proximal: 83/0 cm/s, triphasic         Subclavian artery, medial: 86/0 cm/s, 83/0 cm/s, 95/0 cm/s,  triphasic       Subclavian artery, mid: 105/0 cm/s, triphasic         Axillary artery: 99/0 cm/s, triphasic         Brachial artery, proximal: 72/14 cm/s, triphasic       Brachial artery, mid: 106/0 cm/s, triphasic       Brachial artery, antecubital fossa: 83/17 cm/s, triphasic         Radial artery, origin: 55/0 cm/s, triphasic       Radial artery, wrist: 64/12 cm/s, triphasic         Ulnar artery, origin: 54/0 cm/s, triphasic       Ulnar artery, wrist: 42/7 cm/s, triphasic      Impression    IMPRESSION:  1. Arrhythmia.    2. Etiology of patient's asymmetric blood pressures not demonstrated.  No significant arterial stenosis demonstrated.    ANNA BEACH MD

## 2021-01-24 LAB
ALBUMIN SERPL-MCNC: 2.7 G/DL (ref 3.4–5)
ALP SERPL-CCNC: 160 U/L (ref 40–150)
ALT SERPL W P-5'-P-CCNC: 156 U/L (ref 0–70)
ANION GAP SERPL CALCULATED.3IONS-SCNC: 6 MMOL/L (ref 3–14)
AST SERPL W P-5'-P-CCNC: 72 U/L (ref 0–45)
BILIRUB SERPL-MCNC: 3.9 MG/DL (ref 0.2–1.3)
BUN SERPL-MCNC: 29 MG/DL (ref 7–30)
CALCIUM SERPL-MCNC: 8.8 MG/DL (ref 8.5–10.1)
CHLORIDE SERPL-SCNC: 104 MMOL/L (ref 94–109)
CO2 SERPL-SCNC: 24 MMOL/L (ref 20–32)
CREAT SERPL-MCNC: 1.07 MG/DL (ref 0.66–1.25)
ERYTHROCYTE [DISTWIDTH] IN BLOOD BY AUTOMATED COUNT: 15.9 % (ref 10–15)
GFR SERPL CREATININE-BSD FRML MDRD: 79 ML/MIN/{1.73_M2}
GLUCOSE BLDC GLUCOMTR-MCNC: 114 MG/DL (ref 70–99)
GLUCOSE BLDC GLUCOMTR-MCNC: 125 MG/DL (ref 70–99)
GLUCOSE BLDC GLUCOMTR-MCNC: 165 MG/DL (ref 70–99)
GLUCOSE SERPL-MCNC: 106 MG/DL (ref 70–99)
HCT VFR BLD AUTO: 47 % (ref 40–53)
HGB BLD-MCNC: 15.9 G/DL (ref 13.3–17.7)
MAGNESIUM SERPL-MCNC: 2.4 MG/DL (ref 1.6–2.3)
MCH RBC QN AUTO: 33.5 PG (ref 26.5–33)
MCHC RBC AUTO-ENTMCNC: 33.8 G/DL (ref 31.5–36.5)
MCV RBC AUTO: 99 FL (ref 78–100)
PLATELET # BLD AUTO: 156 10E9/L (ref 150–450)
POTASSIUM SERPL-SCNC: 4.5 MMOL/L (ref 3.4–5.3)
PROT SERPL-MCNC: 6 G/DL (ref 6.8–8.8)
RBC # BLD AUTO: 4.74 10E12/L (ref 4.4–5.9)
SODIUM SERPL-SCNC: 133 MMOL/L (ref 133–144)
UFH PPP CHRO-ACNC: 0.29 IU/ML
UFH PPP CHRO-ACNC: 0.3 IU/ML
UFH PPP CHRO-ACNC: 0.53 IU/ML
WBC # BLD AUTO: 5.7 10E9/L (ref 4–11)

## 2021-01-24 PROCEDURE — 999N001017 HC STATISTIC GLUCOSE BY METER IP

## 2021-01-24 PROCEDURE — 250N000011 HC RX IP 250 OP 636: Performed by: STUDENT IN AN ORGANIZED HEALTH CARE EDUCATION/TRAINING PROGRAM

## 2021-01-24 PROCEDURE — 250N000013 HC RX MED GY IP 250 OP 250 PS 637: Performed by: STUDENT IN AN ORGANIZED HEALTH CARE EDUCATION/TRAINING PROGRAM

## 2021-01-24 PROCEDURE — 85520 HEPARIN ASSAY: CPT | Performed by: STUDENT IN AN ORGANIZED HEALTH CARE EDUCATION/TRAINING PROGRAM

## 2021-01-24 PROCEDURE — 85520 HEPARIN ASSAY: CPT | Performed by: INTERNAL MEDICINE

## 2021-01-24 PROCEDURE — 214N000001 HC R&B CCU UMMC

## 2021-01-24 PROCEDURE — 80053 COMPREHEN METABOLIC PANEL: CPT | Performed by: STUDENT IN AN ORGANIZED HEALTH CARE EDUCATION/TRAINING PROGRAM

## 2021-01-24 PROCEDURE — 36415 COLL VENOUS BLD VENIPUNCTURE: CPT | Performed by: STUDENT IN AN ORGANIZED HEALTH CARE EDUCATION/TRAINING PROGRAM

## 2021-01-24 PROCEDURE — 83735 ASSAY OF MAGNESIUM: CPT | Performed by: STUDENT IN AN ORGANIZED HEALTH CARE EDUCATION/TRAINING PROGRAM

## 2021-01-24 PROCEDURE — 85027 COMPLETE CBC AUTOMATED: CPT | Performed by: STUDENT IN AN ORGANIZED HEALTH CARE EDUCATION/TRAINING PROGRAM

## 2021-01-24 PROCEDURE — 99233 SBSQ HOSP IP/OBS HIGH 50: CPT | Mod: GC | Performed by: INTERNAL MEDICINE

## 2021-01-24 PROCEDURE — 36415 COLL VENOUS BLD VENIPUNCTURE: CPT | Performed by: INTERNAL MEDICINE

## 2021-01-24 RX ADMIN — FOLIC ACID 1 MG: 1 TABLET ORAL at 08:01

## 2021-01-24 RX ADMIN — ASPIRIN 81 MG: 81 TABLET, CHEWABLE ORAL at 08:01

## 2021-01-24 RX ADMIN — MULTIPLE VITAMINS W/ MINERALS TAB 1 TABLET: TAB at 08:01

## 2021-01-24 RX ADMIN — METOPROLOL SUCCINATE 50 MG: 50 TABLET, EXTENDED RELEASE ORAL at 08:01

## 2021-01-24 RX ADMIN — HEPARIN SODIUM 850 UNITS/HR: 10000 INJECTION, SOLUTION INTRAVENOUS at 15:41

## 2021-01-24 RX ADMIN — METOPROLOL SUCCINATE 50 MG: 50 TABLET, EXTENDED RELEASE ORAL at 19:53

## 2021-01-24 RX ADMIN — THIAMINE HCL TAB 100 MG 100 MG: 100 TAB at 08:01

## 2021-01-24 ASSESSMENT — ACTIVITIES OF DAILY LIVING (ADL)
ADLS_ACUITY_SCORE: 16
ADLS_ACUITY_SCORE: 17
ADLS_ACUITY_SCORE: 17
ADLS_ACUITY_SCORE: 16
ADLS_ACUITY_SCORE: 16
ADLS_ACUITY_SCORE: 17

## 2021-01-24 ASSESSMENT — MIFFLIN-ST. JEOR: SCORE: 1554.33

## 2021-01-24 NOTE — PLAN OF CARE
D: Pt who presents from OSH for further management of newly diagnosed CM (EF~15-20%) and surgical consideration of known severe MR. Hx of severe MR 2/2 flail P1 posterior mitral leaflet, CAD s/p PCI (2019), pAF on Eliquis, alcohol use disorder c/b withdrawal seizures and PE (Nov 2019).      I/A: Pt alert and oriented x4. CIWA done q4hrs, scores 0. Pt A.Fib with HRs 90-120s. Heparin gtt continued therapeutically at 1050 units/hr. Next Hep 10A check scheduled for tonight at 1900. On RA with O2 sats >92%. Pt denies any pain, lightheadedness or dizziness today. Appetite good, denies nausea. Per provider, assessing pt pre-diabetic/diabetic status. BG checks done AC, readings this shift 100 and 141. Last BM today. Pt voiding; urine dark and odorous. UA ordered, collected and sent down to lab. Pt up independently in room. IV abx given as scheduled.       P: Followup on Hep 10A check at 1900 and change gtt accordingly. Continue VAD/Transplant/Valve workup. Pt scheduled for cardioversion on Monday, 1/25 followed by echo and possible RHC/LHC, angio? Continue to monitor pt with every encounter. Notify Cards 2 with any changes or concerns.

## 2021-01-24 NOTE — PLAN OF CARE
D: Admitted 1/21 from OSH for management of newly diagnosed CM and surgical consideration of severe MR. Hx of severe MR 2/2 flail P1 posterior mitral leaflet, CAD (s/p PCI 2019), pAF on Eliquis, alcohol use disorder c/b withdrawal seizures, and PE (Nov 2019).     I: Monitored vitals and assessed pt status.   Changed:  Running: Heparin 1050 units/hr   PRN:    A: A0x4. Afib 80s-120s. Blood pressure in low 100s systolic. Afebrile. Urinating adequately. Blood glucose at HS was 158. CIWAs q4 remain 0. Last BM 1/23. Cyst remains intact on right upper chest. Next 10a draw this am (1/24). Denies pain or SOB. Up independently in room. Encouraged to ambulate more.     P: Continue to monitor Pt status and report changes to Cards 2. Plan for RHC/LHC Monday.

## 2021-01-24 NOTE — PLAN OF CARE
D: Pt who presents from OSH for further management of newly diagnosed CM (EF~15-20%) and surgical consideration of known severe MR. Hx of severe MR 2/2 flail P1 posterior mitral leaflet, CAD s/p PCI (2019), pAF on Eliquis, alcohol use disorder c/b withdrawal seizures and PE (Nov 2019).      I/A: Pt alert and oriented x4. CIWA done q4hrs, scores remain 0. Pt A.Fib with HRs 90-130s. Heparin gtt currently running at 850 units/hr. Next Hep 10A check scheduled for 2115 tonight. Pt on RA with O2 sats >92%. Denies any pain, lightheadedness or dizziness today. Appetite good, denies nausea. BG checks this shift 106, 114 and 125. Last BM today. Pt voiding; urine remains dark and odorous. Pt denies dysuria or other  complications. Renal function stable. LFTs improving daily. Pt up independently in room.       P: Followup on Hep 10A recheck at 2115 and change gtt accordingly. Pt to possibly have a cardioversion, RHC/LHC, angio tomorrow? Pt to be NPO at [facility]. Continue to monitor and assess pt with every encounter. Notify Cards 2 with any changes or concerns.

## 2021-01-24 NOTE — PROGRESS NOTES
[facility]     Cardiology Progress Note- Cards 2        Date of Admission:  1/21/2021     Assessment & Plan: SL        Cordell Adams Jr. is a 55 year old male with history of severe MR 2/2 flail P1 posterior mitral leaflet, CAD (s/p PCI to pLAD in 2019), pAF on Eliquis, alcohol use disorder c/b withdrawal seizures, and PE (Nov 2019) transferred from [facility] on 1/21/2021 for management of newly diagnosed cardiomyopathy (LVEF ~15-20%) and surgical consideration of known severe mitral regurgitation.     Today:  - holding lasix, appears euvolemic   - plan for Monday 1/25; RHC/LHC  - NPO at [facility]    # Acute decompensated HFrEF, NYHA functional class IV, Stage C  # ICMP & NICM 2/2 alcohol abuse and possible tachycardia induced CM 2/2 Afib w/ RVR  # LV systolic dysfunction (LVEF 15-20%)  # CAD s/p PCI to pLAD (2019)  ADHFrEF likely non-ischemic 2/2 alcohol vs tachycardic induced vs valvular.   - Last MENDEL 1/22/202:  severely reduced LV function (EF 15-20%) w/ severe diffuse hypokinesis; mild RVSD w/ mild dilation; severe MR w/ a flail posterior mitral leaflet (p2 scallop) - ERO 0.61 cm^2 w/ systolic venous reversal in R sided veins. No significant TR present.   - Fluid status: appears euvolemic.  - Diuresis: Holding PO lasix 40mg  - Ionotrope: none  - Afterload reduction: holding PTA ACEi given borderline MAPs  - Beta blocker: Toprol XL 50 BID  - Aldosterone antagonist: holding for now   - Antiplatelet/statin: On ASA 81 mg; hold lipitor 40 mg given poor liver function.  - SCD prophylaxis: none  - Advanced therapy: not a candidate currently due to alcohol use.   - Strict I/Os  - Daily weights  - 2L/2gm Na restricted diet  - Follow BMP to keep K>4, Mg >2 with lyte replacement protocol  - NPO at [facility]  - Plan for RHC and LHC on Monday 1/25/2021      # Severe mitral regurgitation  2/2 flail posterior mitral leaflet. ERO 0.61 cm^2 w/ systolic venous reversal in R  sided veins. LV is not markedly dilated which is reassuring for recovery. Planned previously for surgical repair following large subcutaneous cyst removal however lost to follow up. It is unclear currently if he would tolerate complete repair given markedly reduced LVEF- may have difficulty coming off bypass. Query if mitral clip would be a better option. Will rediscuss with [facility] on Monday following heart catheterization.   - [facility] team following   - RHC and LHC tomorrow    - Medical management as above    # Paroxysmal Afib with RVR, currently relatively rate controlled (-110/min)  Rates at OSH 110s-180s recently, likely 2/2 alcohol withdrawal, decompensated heart failure and aspiration pneumonia.  Prior to hospitalization was prescribed Amiodarone 200mg, Metoprolol succinate 50mg [but patient was not taking]. Has failed cardioversion x 2 in past. CHADSVASC is 2 (CHF, MI) FT4 was elevated at 1.78 (1.46 ULN), though difficult to interpret in acute setting. Currently rates acceptable around low 100s. Repeat echo without evidence of thrombus however I suspect we may have difficulty cardioverting him until his MR is addressed.   - Continue metoprolol 50 mg BID   - Holding off on digoxin and amiodarone   - Continue anticoagulation w/ heparin gtt.      # Alcoholic hepatitis  # Direct hyperbilirubinemia  # Coagulopathy  Hepatocellular injury pattern likely related to alcohol use compounded by congestive hepatopathy. Abdominal US with hepatic steatosis but no contour nodularity or solid mass to suggest cirrhosis or HCC. Hepatitis panel, HIV negative. LFTs downtrending with diuresis.   - Daily CMP  - HF management as above      # Alcohol use disorder  EtOH 168 mg/dL at OSH. Last drink was 1/16. Requiring PO diazepam at OSH for withdrawal. CD consult at OSH offered counseling and resource information. No longer appears to be going through withdrawal. Last Benzos on 1/22 AM.  No longer scoring on CIWA   - Discontinue CIWA  protocol   - Thiamine/folate      # Acute hypoxic respiratory failure, resolved  Per chart, developed this in OSH. Likely related to fluid overload given resuscitation in ED and maintenance fluids to treat CHRISTOPHER. Improved with lasix gtt and patient was weaned to room air on 1/18. Procalcitonin was elevated 1/19 and he was started on ceftriaxone for possible aspiration pneumonia, completed 1/19-1/23. At this point, he is not having infectious symptoms.   - S/p Ceftriaxone 2g q24 1/19-1/23  - CBC daily      # Upper extremity blood pressure differential  Notified by RN that the patient has had a persistent discrepancy in BP readings: L arm is consistently <20mmHg compared with right. Patient asymptomatic. Initial BP measurements in L arm, however, were WNL. BUE arterial duplex without evidence of PAD.      # Prediabetes   A1c 5.7 at OSH. Was hyperglycemic intermittently.   - BID, HS BG; if high, can consider sliding scale insulin (was on this at OSH)     # Hypervolemic hyponatremia, improving  129 on admission, 135 prior to transfer. Will continue to monitor.  - Diuresis as above      # Hx PE  Diagnosed in 2018. Noncompliance with apixaban due to cost. Was started on heparin gtt at OSH due to high suspicion for PE (d-dimer elevated but CTPA not performed due to CHRISTOPHER). At this point, do not have a high suspicion for PE.   - Anticoagulation for afib as above  - Likely resume apixaban at discharge if low cost can be confirmed     # CHRISTOPHER, resolved  Cr was 2 1/16, baseline of 1. Likely 2/2 cardiorenal in light of the above. Improving. UA from OSH unremarkable.      # R Anterior chest wall sebaceous cyst  Pt was seen in 02/2020 to discuss mitral valve repair, and at that time, was referred to general surgery for removal of an anterior chest wall sebaceous cyst. cyst has been present for 12-15 years, and has enlarged slowly over time. Was planning to have cyst removed by general surgery 1/20 with MAC after holding heparin gtt 6  hrs. But this was cancelled in light of transfer  - CVS aware                                                                                                                                                                                                                                            DVT Prophylaxis: On therapeutic AC with UFH  Turner Catheter: not present  Code Status: Full  Lines: peripheral IVs    Disposition Plan   Expected discharge: > 7 days, recommended to transitional care unit once advanced heart failure work-up completed.    Entered: Sarkis Solo MD 01/24/2021, 2:07 PM        The patient's care was discussed with the Attending Physician, Dr. Leny Wade .    Sakris Solo MD  [facility]   Please see sign in/sign out for up to date coverage information  ______________________________________________________________________    Interval History   Nursing notes reviewed. NAEO. CIWA 0. No benzos needed. Not complaints this morning. Some mild ARGEULLO which is his baseline. No palpitations, chest pain or leg swelling.     Data reviewed today: I reviewed all medications, new labs and imaging results over the last 24 hours. I personally reviewed     Physical Exam   Vital Signs: Temp: 98.3  F (36.8  C) Temp src: Oral BP: 98/77 Pulse: 127   Resp: 16 SpO2: 95 % O2 Device: None (Room air)    Weight: 165 lbs 8 oz  General Appearance: Comfortably lying in bed, NAD  HEENT: mild b/l conjunctival icterus.   Respiratory: CTA b/l  Cardiovascular: irregularly irregular, 3/6 systolic murmur, no JVD, no peripheral edema   GI: abdomen is soft, NTND  Skin: no concerning rashes or lesions   Neuro: A&Ox3, no asterixis, no tremors   MSK: Large cyst on R anterior chest wall    Data   Recent Labs   Lab 01/24/21  0637 01/23/21  0548 01/23/21  0547 01/22/21  1604 01/21/21  2152 01/21/21  2152 01/21/21  1845   WBC 5.7  --   --   --   --  5.6 6.5   HGB 15.9  --   --   --   --   16.0 16.8   MCV 99  --   --   --   --  99 97     --   --   --   --  166 183   INR  --   --   --   --   --   --  1.23*    131*  --  131*   < >  --  132*   POTASSIUM 4.5 4.2 Duplicate request 3.8   < >  --  4.2   CHLORIDE 104 101  --  98   < >  --  101   CO2 24 22  --  23   < >  --  24   BUN 29 35*  --  34*   < >  --  34*   CR 1.07 1.06  --  1.24   < >  --  1.15   ANIONGAP 6 8  --  10   < >  --  8   BRAD 8.8 8.7  --  8.8   < >  --  8.8   * 103*  --  178*   < >  --  115*   ALBUMIN 2.7* 2.7* Duplicate request  --    < >  --  2.7*   PROTTOTAL 6.0* 6.2* Duplicate request  --    < >  --  6.1*   BILITOTAL 3.9* 4.2* Duplicate request  --    < >  --  6.1*   ALKPHOS 160* 169* Duplicate request  --    < >  --  161*   * 188* Duplicate request  --    < >  --  242*   AST 72* 92* Duplicate request  --    < >  --  143*    < > = values in this interval not displayed.     No results found for this or any previous visit (from the past 24 hour(s)).

## 2021-01-25 LAB
ALBUMIN SERPL-MCNC: 2.7 G/DL (ref 3.4–5)
ALP SERPL-CCNC: 152 U/L (ref 40–150)
ALT SERPL W P-5'-P-CCNC: 161 U/L (ref 0–70)
ANION GAP SERPL CALCULATED.3IONS-SCNC: 5 MMOL/L (ref 3–14)
AST SERPL W P-5'-P-CCNC: 90 U/L (ref 0–45)
BILIRUB SERPL-MCNC: 2.6 MG/DL (ref 0.2–1.3)
BUN SERPL-MCNC: 27 MG/DL (ref 7–30)
CALCIUM SERPL-MCNC: 8.6 MG/DL (ref 8.5–10.1)
CHLORIDE SERPL-SCNC: 105 MMOL/L (ref 94–109)
CO2 SERPL-SCNC: 22 MMOL/L (ref 20–32)
CREAT SERPL-MCNC: 1.21 MG/DL (ref 0.66–1.25)
GFR SERPL CREATININE-BSD FRML MDRD: 68 ML/MIN/{1.73_M2}
GLUCOSE BLDC GLUCOMTR-MCNC: 101 MG/DL (ref 70–99)
GLUCOSE BLDC GLUCOMTR-MCNC: 112 MG/DL (ref 70–99)
GLUCOSE BLDC GLUCOMTR-MCNC: 121 MG/DL (ref 70–99)
GLUCOSE BLDC GLUCOMTR-MCNC: 90 MG/DL (ref 70–99)
GLUCOSE SERPL-MCNC: 91 MG/DL (ref 70–99)
INR PPP: 1.02 (ref 0.86–1.14)
INTERPRETATION ECG - MUSE: NORMAL
LABORATORY COMMENT REPORT: NORMAL
MAGNESIUM SERPL-MCNC: 2.1 MG/DL (ref 1.6–2.3)
POTASSIUM SERPL-SCNC: 4.4 MMOL/L (ref 3.4–5.3)
PROT SERPL-MCNC: 6.1 G/DL (ref 6.8–8.8)
SARS-COV-2 RNA RESP QL NAA+PROBE: NEGATIVE
SODIUM SERPL-SCNC: 132 MMOL/L (ref 133–144)
SPECIMEN SOURCE: NORMAL
UFH PPP CHRO-ACNC: 0.33 IU/ML

## 2021-01-25 PROCEDURE — 250N000013 HC RX MED GY IP 250 OP 250 PS 637: Performed by: STUDENT IN AN ORGANIZED HEALTH CARE EDUCATION/TRAINING PROGRAM

## 2021-01-25 PROCEDURE — 99152 MOD SED SAME PHYS/QHP 5/>YRS: CPT | Performed by: INTERNAL MEDICINE

## 2021-01-25 PROCEDURE — 84132 ASSAY OF SERUM POTASSIUM: CPT | Performed by: STUDENT IN AN ORGANIZED HEALTH CARE EDUCATION/TRAINING PROGRAM

## 2021-01-25 PROCEDURE — 250N000011 HC RX IP 250 OP 636: Performed by: INTERNAL MEDICINE

## 2021-01-25 PROCEDURE — 93456 R HRT CORONARY ARTERY ANGIO: CPT | Performed by: INTERNAL MEDICINE

## 2021-01-25 PROCEDURE — 214N000001 HC R&B CCU UMMC

## 2021-01-25 PROCEDURE — 250N000009 HC RX 250: Performed by: INTERNAL MEDICINE

## 2021-01-25 PROCEDURE — U0003 INFECTIOUS AGENT DETECTION BY NUCLEIC ACID (DNA OR RNA); SEVERE ACUTE RESPIRATORY SYNDROME CORONAVIRUS 2 (SARS-COV-2) (CORONAVIRUS DISEASE [COVID-19]), AMPLIFIED PROBE TECHNIQUE, MAKING USE OF HIGH THROUGHPUT TECHNOLOGIES AS DESCRIBED BY CMS-2020-01-R: HCPCS | Performed by: STUDENT IN AN ORGANIZED HEALTH CARE EDUCATION/TRAINING PROGRAM

## 2021-01-25 PROCEDURE — 93010 ELECTROCARDIOGRAM REPORT: CPT | Performed by: INTERNAL MEDICINE

## 2021-01-25 PROCEDURE — C1887 CATHETER, GUIDING: HCPCS | Performed by: INTERNAL MEDICINE

## 2021-01-25 PROCEDURE — B2111ZZ FLUOROSCOPY OF MULTIPLE CORONARY ARTERIES USING LOW OSMOLAR CONTRAST: ICD-10-PCS | Performed by: INTERNAL MEDICINE

## 2021-01-25 PROCEDURE — C1894 INTRO/SHEATH, NON-LASER: HCPCS | Performed by: INTERNAL MEDICINE

## 2021-01-25 PROCEDURE — 80053 COMPREHEN METABOLIC PANEL: CPT | Performed by: STUDENT IN AN ORGANIZED HEALTH CARE EDUCATION/TRAINING PROGRAM

## 2021-01-25 PROCEDURE — 99233 SBSQ HOSP IP/OBS HIGH 50: CPT | Mod: GC | Performed by: INTERNAL MEDICINE

## 2021-01-25 PROCEDURE — 4A023N6 MEASUREMENT OF CARDIAC SAMPLING AND PRESSURE, RIGHT HEART, PERCUTANEOUS APPROACH: ICD-10-PCS | Performed by: INTERNAL MEDICINE

## 2021-01-25 PROCEDURE — 272N000001 HC OR GENERAL SUPPLY STERILE: Performed by: INTERNAL MEDICINE

## 2021-01-25 PROCEDURE — 93005 ELECTROCARDIOGRAM TRACING: CPT

## 2021-01-25 PROCEDURE — 85520 HEPARIN ASSAY: CPT | Performed by: STUDENT IN AN ORGANIZED HEALTH CARE EDUCATION/TRAINING PROGRAM

## 2021-01-25 PROCEDURE — 36415 COLL VENOUS BLD VENIPUNCTURE: CPT | Performed by: INTERNAL MEDICINE

## 2021-01-25 PROCEDURE — 99153 MOD SED SAME PHYS/QHP EA: CPT | Performed by: INTERNAL MEDICINE

## 2021-01-25 PROCEDURE — 85610 PROTHROMBIN TIME: CPT | Performed by: STUDENT IN AN ORGANIZED HEALTH CARE EDUCATION/TRAINING PROGRAM

## 2021-01-25 PROCEDURE — 36415 COLL VENOUS BLD VENIPUNCTURE: CPT | Performed by: STUDENT IN AN ORGANIZED HEALTH CARE EDUCATION/TRAINING PROGRAM

## 2021-01-25 PROCEDURE — U0005 INFEC AGEN DETEC AMPLI PROBE: HCPCS | Performed by: STUDENT IN AN ORGANIZED HEALTH CARE EDUCATION/TRAINING PROGRAM

## 2021-01-25 PROCEDURE — 83735 ASSAY OF MAGNESIUM: CPT | Performed by: STUDENT IN AN ORGANIZED HEALTH CARE EDUCATION/TRAINING PROGRAM

## 2021-01-25 PROCEDURE — 999N001017 HC STATISTIC GLUCOSE BY METER IP

## 2021-01-25 PROCEDURE — 85520 HEPARIN ASSAY: CPT | Performed by: INTERNAL MEDICINE

## 2021-01-25 RX ORDER — NALOXONE HYDROCHLORIDE 0.4 MG/ML
0.2 INJECTION, SOLUTION INTRAMUSCULAR; INTRAVENOUS; SUBCUTANEOUS
Status: DISCONTINUED | OUTPATIENT
Start: 2021-01-25 | End: 2021-01-28 | Stop reason: HOSPADM

## 2021-01-25 RX ORDER — IOPAMIDOL 755 MG/ML
INJECTION, SOLUTION INTRAVASCULAR
Status: DISCONTINUED | OUTPATIENT
Start: 2021-01-25 | End: 2021-01-25 | Stop reason: HOSPADM

## 2021-01-25 RX ORDER — DOBUTAMINE HYDROCHLORIDE 200 MG/100ML
2-20 INJECTION INTRAVENOUS CONTINUOUS PRN
Status: DISCONTINUED | OUTPATIENT
Start: 2021-01-25 | End: 2021-01-25 | Stop reason: HOSPADM

## 2021-01-25 RX ORDER — EPTIFIBATIDE 2 MG/ML
180 INJECTION, SOLUTION INTRAVENOUS EVERY 10 MIN PRN
Status: DISCONTINUED | OUTPATIENT
Start: 2021-01-25 | End: 2021-01-25 | Stop reason: HOSPADM

## 2021-01-25 RX ORDER — HEPARIN SODIUM 10000 [USP'U]/100ML
0-5000 INJECTION, SOLUTION INTRAVENOUS CONTINUOUS
Status: DISPENSED | OUTPATIENT
Start: 2021-01-25 | End: 2021-01-27

## 2021-01-25 RX ORDER — FENTANYL CITRATE 50 UG/ML
25-50 INJECTION, SOLUTION INTRAMUSCULAR; INTRAVENOUS
Status: ACTIVE | OUTPATIENT
Start: 2021-01-25 | End: 2021-01-25

## 2021-01-25 RX ORDER — ARGATROBAN 1 MG/ML
350 INJECTION, SOLUTION INTRAVENOUS
Status: DISCONTINUED | OUTPATIENT
Start: 2021-01-25 | End: 2021-01-25 | Stop reason: HOSPADM

## 2021-01-25 RX ORDER — FUROSEMIDE 20 MG
20 TABLET ORAL DAILY
Status: DISCONTINUED | OUTPATIENT
Start: 2021-01-25 | End: 2021-01-28 | Stop reason: HOSPADM

## 2021-01-25 RX ORDER — NITROGLYCERIN 5 MG/ML
VIAL (ML) INTRAVENOUS
Status: DISCONTINUED | OUTPATIENT
Start: 2021-01-25 | End: 2021-01-25 | Stop reason: HOSPADM

## 2021-01-25 RX ORDER — ARGATROBAN 1 MG/ML
150 INJECTION, SOLUTION INTRAVENOUS
Status: DISCONTINUED | OUTPATIENT
Start: 2021-01-25 | End: 2021-01-25 | Stop reason: HOSPADM

## 2021-01-25 RX ORDER — EPTIFIBATIDE 2 MG/ML
2 INJECTION, SOLUTION INTRAVENOUS CONTINUOUS PRN
Status: DISCONTINUED | OUTPATIENT
Start: 2021-01-25 | End: 2021-01-25 | Stop reason: HOSPADM

## 2021-01-25 RX ORDER — ATROPINE SULFATE 0.1 MG/ML
0.5 INJECTION INTRAVENOUS
Status: ACTIVE | OUTPATIENT
Start: 2021-01-25 | End: 2021-01-25

## 2021-01-25 RX ORDER — DOPAMINE HYDROCHLORIDE 160 MG/100ML
2-20 INJECTION, SOLUTION INTRAVENOUS CONTINUOUS PRN
Status: DISCONTINUED | OUTPATIENT
Start: 2021-01-25 | End: 2021-01-25 | Stop reason: HOSPADM

## 2021-01-25 RX ORDER — HEPARIN SODIUM 10000 [USP'U]/100ML
100-1000 INJECTION, SOLUTION INTRAVENOUS CONTINUOUS PRN
Status: DISCONTINUED | OUTPATIENT
Start: 2021-01-25 | End: 2021-01-25 | Stop reason: HOSPADM

## 2021-01-25 RX ORDER — SODIUM NITROPRUSSIDE 25 MG/ML
INJECTION INTRAVENOUS
Status: DISCONTINUED | OUTPATIENT
Start: 2021-01-25 | End: 2021-01-25 | Stop reason: HOSPADM

## 2021-01-25 RX ORDER — FLUMAZENIL 0.1 MG/ML
0.2 INJECTION, SOLUTION INTRAVENOUS
Status: ACTIVE | OUTPATIENT
Start: 2021-01-25 | End: 2021-01-25

## 2021-01-25 RX ORDER — NALOXONE HYDROCHLORIDE 0.4 MG/ML
0.4 INJECTION, SOLUTION INTRAMUSCULAR; INTRAVENOUS; SUBCUTANEOUS
Status: DISCONTINUED | OUTPATIENT
Start: 2021-01-25 | End: 2021-01-28 | Stop reason: HOSPADM

## 2021-01-25 RX ORDER — NITROGLYCERIN 20 MG/100ML
10-200 INJECTION INTRAVENOUS CONTINUOUS PRN
Status: DISCONTINUED | OUTPATIENT
Start: 2021-01-25 | End: 2021-01-25 | Stop reason: HOSPADM

## 2021-01-25 RX ORDER — HEPARIN SODIUM 1000 [USP'U]/ML
INJECTION, SOLUTION INTRAVENOUS; SUBCUTANEOUS
Status: DISCONTINUED | OUTPATIENT
Start: 2021-01-25 | End: 2021-01-25 | Stop reason: HOSPADM

## 2021-01-25 RX ADMIN — FOLIC ACID 1 MG: 1 TABLET ORAL at 08:04

## 2021-01-25 RX ADMIN — FUROSEMIDE 20 MG: 20 TABLET ORAL at 13:57

## 2021-01-25 RX ADMIN — METOPROLOL SUCCINATE 50 MG: 50 TABLET, EXTENDED RELEASE ORAL at 08:05

## 2021-01-25 RX ADMIN — THIAMINE HCL TAB 100 MG 100 MG: 100 TAB at 08:05

## 2021-01-25 RX ADMIN — ASPIRIN 81 MG: 81 TABLET, CHEWABLE ORAL at 08:04

## 2021-01-25 RX ADMIN — MULTIPLE VITAMINS W/ MINERALS TAB 1 TABLET: TAB at 08:04

## 2021-01-25 ASSESSMENT — ACTIVITIES OF DAILY LIVING (ADL)
ADLS_ACUITY_SCORE: 17
ADLS_ACUITY_SCORE: 15
ADLS_ACUITY_SCORE: 17
ADLS_ACUITY_SCORE: 14
ADLS_ACUITY_SCORE: 17

## 2021-01-25 ASSESSMENT — MIFFLIN-ST. JEOR: SCORE: 1558.42

## 2021-01-25 NOTE — PLAN OF CARE
D: Admitted 1/21 from OSH for management of newly diagnosed CM and surgical consideration of severe MR. Hx of severe MR 2/2 flail P1 posterior mitral leaflet, CAD (s/p PCI 2019), pAF on Eliquis, alcohol use disorder c/b withdrawal seizures, and PE (Nov 2019).      I: Monitored vitals and assessed pt status.   Changed:  Running: heparin 850 units/hr   PRN:    A: A0x4. Able to express needs. Afib 80s-110s. Blood pressures different on arms. Room air. Afebrile. Urinating adequately, urine odorous and dark jocy. NPO since midnight. Up independently. Right upper chest cyst intact. Next heparin draw scheduled for AM. Blood glucose 165 at HS. Last BM 1/24 per patient. No pain. Occasional SOB. Slight jaundice in both sclera     P: Continue to monitor Pt status and report changes to Cards 2. RHC today 1/25.

## 2021-01-25 NOTE — PROGRESS NOTES
[facility]     Cardiology Progress Note- Cards 2        Date of Admission:  1/21/2021     Assessment & Plan: S        Cordell Adams Jr. is a 55 year old male with history of severe MR 2/2 flail P1 posterior mitral leaflet, CAD (s/p PCI to pLAD in 2019), pAF on Eliquis, alcohol use disorder c/b withdrawal seizures, and PE (Nov 2019) transferred from [facility] on 1/21/2021 for management of newly diagnosed cardiomyopathy (LVEF ~15-20%) and surgical consideration of known severe mitral regurgitation.     Today:  - PO lasix 20mg daily  - plan for RHC/LHC  - may consider cardioversion pending cath findings     # Acute decompensated HFrEF, NYHA functional class IV, Stage C  # ICMP & NICM 2/2 alcohol abuse and possible tachycardia induced CM 2/2 Afib w/ RVR  # LV systolic dysfunction (LVEF 15-20%)  # CAD s/p PCI to pLAD (2019)  ADHFrEF likely non-ischemic 2/2 alcohol vs tachycardic induced vs valvular.   - Last MENDEL 1/22/202:  severely reduced LV function (EF 15-20%) w/ severe diffuse hypokinesis; mild RVSD w/ mild dilation; severe MR w/ a flail posterior mitral leaflet (p2 scallop) - ERO 0.61 cm^2 w/ systolic venous reversal in R sided veins. No significant TR present.   - Fluid status: appears euvolemic. RHC and LHC today.  - Diuresis: Start PO lasix 20mg daily  - Ionotrope: none  - Afterload reduction: holding PTA ACEi given borderline MAPs  - Beta blocker: Toprol XL 50 BID  - Aldosterone antagonist: holding for now   - Antiplatelet/statin: On ASA 81 mg; hold lipitor 40 mg given poor liver function.  - SCD prophylaxis: none  - Advanced therapy: not a candidate currently due to alcohol use.   - Strict I/Os  - Daily weights  - 2L/2gm Na restricted diet  - Follow BMP to keep K>4, Mg >2 with lyte replacement protocol  - NPO at [facility]  - Plan for RHC and LHC on Monday 1/25/2021      # Severe mitral regurgitation  2/2 flail posterior mitral leaflet. ERO 0.61 cm^2  w/ systolic venous reversal in R sided veins. LV is not markedly dilated which is reassuring for recovery. Planned previously for surgical repair following large subcutaneous cyst removal however lost to follow up. It is unclear currently if he would tolerate complete repair given markedly reduced LVEF- may have difficulty coming off bypass. Query if mitral clip would be a better option. Will rediscuss with [facility] following heart catheterization.   - CVS team following   - RHC and LHC today  - Medical management as above    # Paroxysmal Afib with RVR, currently relatively rate controlled (-110/min)  Rates at OSH 110s-180s recently, likely 2/2 alcohol withdrawal, decompensated heart failure and aspiration pneumonia.  Prior to hospitalization was prescribed Amiodarone 200mg, Metoprolol succinate 50mg [but patient was not taking]. Cardioverted once in 2018 in setting of etoh withdrawal. Subsequently failed attempts x2 in 2019 in setting of CHF and severe MR however spontaneously self-converted after dig loading and starting on Amio. CHADSVASC is a minimum of 2 (CHF, MI) FT4 was elevated at 1.78 (1.46 ULN), though difficult to interpret in acute setting. Currently rates acceptable around low 100s. Repeat echo without evidence of thrombus. Will discuss potential cardioversion pending cath results.   - Continue metoprolol 50 mg BID   - Holding off on digoxin and amiodarone for now  - Continue anticoagulation w/ heparin gtt.      # Alcoholic hepatitis  # Direct hyperbilirubinemia  # Coagulopathy  Hepatocellular injury pattern likely related to alcohol use compounded by congestive hepatopathy. Abdominal US with hepatic steatosis but no contour nodularity or solid mass to suggest cirrhosis or HCC. Hepatitis panel, HIV negative. LFTs downtrending with diuresis/euvolemic state.    - Daily CMP  - HF management as above      # Alcohol use disorder  EtOH 168 mg/dL at OSH. Last drink was 1/16. Requiring PO diazepam at OSH for  withdrawal. CD consult at OSH offered counseling and resource information. No longer appears to be going through withdrawal. Last Benzos on 1/22 AM.  No longer scoring on CIWA    - Thiamine/folate      # Acute hypoxic respiratory failure, resolved  Per chart, developed this in OSH. Likely related to fluid overload given resuscitation in ED and maintenance fluids to treat CHRISTOPHER. Improved with lasix gtt and patient was weaned to room air on 1/18. Procalcitonin was elevated 1/19 and he was started on ceftriaxone for possible aspiration pneumonia, completed 1/19-1/23. At this point, he is not having infectious symptoms.   - S/p Ceftriaxone 2g q24 1/19-1/23  - CBC daily      # Upper extremity blood pressure differential  Notified by RN that the patient has had a persistent discrepancy in BP readings: L arm is consistently <20mmHg compared with right. Patient asymptomatic. Initial BP measurements in L arm, however, were WNL. BUE arterial duplex without evidence of PAD.      # Prediabetes   A1c 5.7 at OSH. Was hyperglycemic intermittently.   - BID, HS BG; if high, can consider sliding scale insulin (was on this at OSH)     # Hypervolemic hyponatremia, improving  129 on admission, 135 prior to transfer. Will continue to monitor.  - Diuresis as above      # Hx PE  Diagnosed in 2018. Noncompliance with apixaban due to cost. Was started on heparin gtt at OSH due to high suspicion for PE (d-dimer elevated but CTPA not performed due to CHRISTOPHER). At this point, do not have a high suspicion for PE.   - Anticoagulation for afib as above  - Likely resume apixaban at discharge if low cost can be confirmed     # CHRISTOPHER, resolved  Cr was 2 1/16, baseline of 1. Likely 2/2 cardiorenal in light of the above. Improving. UA from OSH unremarkable.      # R Anterior chest wall sebaceous cyst  Pt was seen in 02/2020 to discuss mitral valve repair, and at that time, was referred to general surgery for removal of an anterior chest wall sebaceous  cyst. cyst has been present for 12-15 years, and has enlarged slowly over time. Was planning to have cyst removed by general surgery 1/20 with MAC after holding heparin gtt 6 hrs. But this was cancelled in light of transfer  - [facility]S aware                                                                                                                                                                                                                                        DVT Prophylaxis: On therapeutic AC with UFH  Turner Catheter: not present  Code Status: Full  Lines: peripheral IVs    Disposition Plan   Expected discharge: > 7 days, recommended to transitional care unit once advanced heart failure work-up completed.    Entered: Sarkis Solo MD 01/25/2021, 12:37 PM        The patient's care was discussed with the Attending Physician, Dr. Leny Wade .    Sarkis Solo MD  [facility]   Please see sign in/sign out for up to date coverage information  ______________________________________________________________________    Interval History   Nursing notes reviewed. NAEO. Not complaints this morning. Has mild ARGUELLO at baseline. No palpitations, chest pain or leg swelling. Understands plan for today.     Data reviewed today: I reviewed all medications, new labs and imaging results over the last 24 hours. I personally reviewed     Physical Exam   Vital Signs: Temp: 96.3  F (35.7  C) Temp src: Axillary BP: 98/58 Pulse: 70   Resp: 19 SpO2: 97 % O2 Device: None (Room air)    Weight: 166 lbs 6.4 oz  General Appearance: Comfortably lying in bed, NAD  HEENT: no conjunctival icterus   Respiratory: CTA b/l  Cardiovascular: irregularly irregular, 3/6 systolic murmur, no JVD, no peripheral edema   GI: abdomen is soft, NTND  Skin: no concerning rashes or lesions   Neuro: A&Ox3, no asterixis, no tremors   MSK: Large cyst on R anterior chest wall    Data   Recent Labs   Lab  01/25/21  0442 01/24/21  0637 01/23/21  0548 01/21/21  2152 01/21/21  2152 01/21/21  1845   WBC  --  5.7  --   --  5.6 6.5   HGB  --  15.9  --   --  16.0 16.8   MCV  --  99  --   --  99 97   PLT  --  156  --   --  166 183   INR 1.02  --   --   --   --  1.23*   * 133 131*   < >  --  132*   POTASSIUM 4.4 4.5 4.2   < >  --  4.2   CHLORIDE 105 104 101   < >  --  101   CO2 22 24 22   < >  --  24   BUN 27 29 35*   < >  --  34*   CR 1.21 1.07 1.06   < >  --  1.15   ANIONGAP 5 6 8   < >  --  8   BRAD 8.6 8.8 8.7   < >  --  8.8   GLC 91 106* 103*   < >  --  115*   ALBUMIN 2.7* 2.7* 2.7*   < >  --  2.7*   PROTTOTAL 6.1* 6.0* 6.2*   < >  --  6.1*   BILITOTAL 2.6* 3.9* 4.2*   < >  --  6.1*   ALKPHOS 152* 160* 169*   < >  --  161*   * 156* 188*   < >  --  242*   AST 90* 72* 92*   < >  --  143*    < > = values in this interval not displayed.     No results found for this or any previous visit (from the past 24 hour(s)).

## 2021-01-26 LAB
ALBUMIN SERPL-MCNC: 2.7 G/DL (ref 3.4–5)
ALP SERPL-CCNC: 117 U/L (ref 40–150)
ALT SERPL W P-5'-P-CCNC: 137 U/L (ref 0–70)
ANION GAP SERPL CALCULATED.3IONS-SCNC: 5 MMOL/L (ref 3–14)
AST SERPL W P-5'-P-CCNC: 73 U/L (ref 0–45)
BILIRUB SERPL-MCNC: 2.2 MG/DL (ref 0.2–1.3)
BUN SERPL-MCNC: 24 MG/DL (ref 7–30)
CALCIUM SERPL-MCNC: 8.4 MG/DL (ref 8.5–10.1)
CHLORIDE SERPL-SCNC: 109 MMOL/L (ref 94–109)
CO2 SERPL-SCNC: 23 MMOL/L (ref 20–32)
CREAT SERPL-MCNC: 0.98 MG/DL (ref 0.66–1.25)
GFR SERPL CREATININE-BSD FRML MDRD: 87 ML/MIN/{1.73_M2}
GLUCOSE BLDC GLUCOMTR-MCNC: 102 MG/DL (ref 70–99)
GLUCOSE BLDC GLUCOMTR-MCNC: 127 MG/DL (ref 70–99)
GLUCOSE BLDC GLUCOMTR-MCNC: 81 MG/DL (ref 70–99)
GLUCOSE SERPL-MCNC: 112 MG/DL (ref 70–99)
MAGNESIUM SERPL-MCNC: 2.1 MG/DL (ref 1.6–2.3)
POTASSIUM SERPL-SCNC: 4.6 MMOL/L (ref 3.4–5.3)
PROT SERPL-MCNC: 5.8 G/DL (ref 6.8–8.8)
SODIUM SERPL-SCNC: 136 MMOL/L (ref 133–144)
UFH PPP CHRO-ACNC: 0.28 IU/ML
UFH PPP CHRO-ACNC: 0.35 IU/ML

## 2021-01-26 PROCEDURE — 250N000013 HC RX MED GY IP 250 OP 250 PS 637: Performed by: STUDENT IN AN ORGANIZED HEALTH CARE EDUCATION/TRAINING PROGRAM

## 2021-01-26 PROCEDURE — 999N001017 HC STATISTIC GLUCOSE BY METER IP

## 2021-01-26 PROCEDURE — 85520 HEPARIN ASSAY: CPT | Performed by: STUDENT IN AN ORGANIZED HEALTH CARE EDUCATION/TRAINING PROGRAM

## 2021-01-26 PROCEDURE — 99233 SBSQ HOSP IP/OBS HIGH 50: CPT | Mod: GC | Performed by: INTERNAL MEDICINE

## 2021-01-26 PROCEDURE — 83735 ASSAY OF MAGNESIUM: CPT | Performed by: STUDENT IN AN ORGANIZED HEALTH CARE EDUCATION/TRAINING PROGRAM

## 2021-01-26 PROCEDURE — 250N000013 HC RX MED GY IP 250 OP 250 PS 637: Performed by: INTERNAL MEDICINE

## 2021-01-26 PROCEDURE — 250N000011 HC RX IP 250 OP 636: Performed by: STUDENT IN AN ORGANIZED HEALTH CARE EDUCATION/TRAINING PROGRAM

## 2021-01-26 PROCEDURE — 214N000001 HC R&B CCU UMMC

## 2021-01-26 PROCEDURE — 99223 1ST HOSP IP/OBS HIGH 75: CPT | Mod: GC | Performed by: INTERNAL MEDICINE

## 2021-01-26 PROCEDURE — 80053 COMPREHEN METABOLIC PANEL: CPT | Performed by: STUDENT IN AN ORGANIZED HEALTH CARE EDUCATION/TRAINING PROGRAM

## 2021-01-26 PROCEDURE — 36415 COLL VENOUS BLD VENIPUNCTURE: CPT | Performed by: STUDENT IN AN ORGANIZED HEALTH CARE EDUCATION/TRAINING PROGRAM

## 2021-01-26 RX ORDER — HYDRALAZINE HYDROCHLORIDE 25 MG/1
25 TABLET, FILM COATED ORAL EVERY 8 HOURS SCHEDULED
Status: DISCONTINUED | OUTPATIENT
Start: 2021-01-26 | End: 2021-01-26

## 2021-01-26 RX ORDER — AMIODARONE HYDROCHLORIDE 200 MG/1
200 TABLET ORAL 2 TIMES DAILY
Status: DISCONTINUED | OUTPATIENT
Start: 2021-01-27 | End: 2021-01-28 | Stop reason: HOSPADM

## 2021-01-26 RX ADMIN — METOPROLOL SUCCINATE 50 MG: 50 TABLET, EXTENDED RELEASE ORAL at 19:05

## 2021-01-26 RX ADMIN — HYDRALAZINE HYDROCHLORIDE 25 MG: 25 TABLET, FILM COATED ORAL at 09:30

## 2021-01-26 RX ADMIN — MULTIPLE VITAMINS W/ MINERALS TAB 1 TABLET: TAB at 08:26

## 2021-01-26 RX ADMIN — THIAMINE HCL TAB 100 MG 100 MG: 100 TAB at 08:26

## 2021-01-26 RX ADMIN — HEPARIN SODIUM 850 UNITS/HR: 10000 INJECTION, SOLUTION INTRAVENOUS at 20:52

## 2021-01-26 RX ADMIN — FOLIC ACID 1 MG: 1 TABLET ORAL at 08:26

## 2021-01-26 RX ADMIN — ASPIRIN 81 MG: 81 TABLET, CHEWABLE ORAL at 08:26

## 2021-01-26 RX ADMIN — HYDRALAZINE HYDROCHLORIDE 25 MG: 25 TABLET, FILM COATED ORAL at 13:06

## 2021-01-26 RX ADMIN — FUROSEMIDE 20 MG: 20 TABLET ORAL at 08:26

## 2021-01-26 RX ADMIN — METOPROLOL SUCCINATE 50 MG: 50 TABLET, EXTENDED RELEASE ORAL at 08:26

## 2021-01-26 ASSESSMENT — ACTIVITIES OF DAILY LIVING (ADL)
ADLS_ACUITY_SCORE: 14

## 2021-01-26 ASSESSMENT — MIFFLIN-ST. JEOR: SCORE: 1557.51

## 2021-01-26 NOTE — CONSULTS
Consultation - Interventional Cardiology and Structural Heart Disease     Cordell Adams MRN# [id number]         Date of Admission: 1/21/21     Reason for consult: severe mitral regurgitation     HPI: Cordell Adams is a 55-year-old man who was admitted on 1/21/21 on transfer from [facility] in [place] with acute decompensated heart failure and new severe reduction in ejection fraction. I have been asked to see him in consultation to assess his candidacy for an [id number] repair of his mitral valve.      Briefly, Mr Adams was admitted with heart failure to [facility] in [place] after about three weeks of exertional dyspnea and orthopnea. His ejection fraction was found to be severely reduced. He underwent diuresis and was transferred to [facility] for consideration of advanced therapies.     Of note, Mr Adams has had trouble with alcohol dependence, and reports frequent withdrawal symptoms, and possibly one alcoholic seizure in the past. He has been drinking daily since around May, consuming about 3/4 of a liter of vodka daily. He attributes this to the fact that he has been staying at home since restaurants have been closed during the pandemic (he is a ). He developed cardiomyopathy (LVEF ~20%) in November 2019 after drinking daily for some time, but after two months of abstinence, his ejection fraction recovered to 55-60%. He also has paroxysmal atrial fibrillation, and has undergone cardioversion in the past.     Mr Adams has had severe mitral regurgitation dating back to at least March 2018. He underwent coronary angiography as a preoperative evaluation for mitral valve surgery,and found to have a proximal LAD lesion. He was evaluated by Dr Mccartney, who recommended LAD PCI at that time and clinical follow-up of asymptomatic mitral valve disease with a view to proceed with surgical repair or replacement in the future if he progressed to symptomatic valve disease.     He takes  "no medications as an outpatient and reports no drug allergies.      Mr Adams lives in [place]. He lives with his fiance. 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      Review of systems did not reveal any new symptoms. In specific, Mr Adams denies fevers, chills, cough, wheezing, hemoptysis, joint pain/swelling, muscle pain, paresthesias, lightheadness, dizziness, temperature intolerance, skin/hair changes, change in mood, change in sleep or appetite, melena or hematochezia, change in bowel habits, hematuria, hesitancy, dribbling, incontinence, easy bruising or bleeding, history of anemia, or rashes.      On examination, vital signs are:  Temp: 98.2  F (36.8  C) Temp src: Oral BP: 93/61 Pulse: 108   Resp: 16 SpO2: 98 % O2 Device: None (Room air) Oxygen Delivery: 1 LPM Height: 170.2 cm (5' 7\") Weight: 75.4 kg (166 lb 3.2 oz)  Estimated body mass index is 26.03 kg/m  as calculated from the following:    Height as of this encounter: 1.702 m (5' 7\").    Weight as of this encounter: 75.4 kg (166 lb 3.2 oz).    Patient is alert and in no acute distress. He has no conjunctival pallor. There is no jugular venous distention. Carotid upstroke is brisk bilaterally. His radial, femoral, and pedal pulses are symmetric and equal. Auscultation of the heart demonstrates regular rate and rhythm with a normal S1 and S2. There is a 3/6 holosystolic murmur at the apex of the heart. The lung fields are clear to auscultation, but there are diminished breath sounds in the bases. Abdomen is soft and non-tender. No bruising or rashes noted. There is no peripheral edema.      Laboratory results were reviewed. Of note, creatinine is 0.98, hemoglobin is 15.9, and platelets are 156,000. ECG from 1/25/21 shows atrial fibrillation with rapid ventricular response rates.      Coronary angiography was performed yesterday, which " demonstrated a patent LAD stent and mild obstructive disease elsewhere. Right heart catheterization showed essentially compensated filling pressures with a reduced cardiac index (1.8L/min/m2).    I reviewed his recent MENDEL. There is a prolapse of the posterior leaflet, likely the P2 segment, with unequivocally severe mitral regurgitation.        In summary, Mr Adams is a 55-year-old with non-ischemic cardiomyopathy in the setting of alcohol dependence and atrial fibrillation. His cardiomyopathy is probably not related to coronary artery disease, nor is his flail posterior leaflet. The anatomy of the mitral valve suggests that Mitraclip is certainly feasible in this instance from a technical standpoint. The fact that the ventricle is not dilated and that pulmonary artery pressure is not severely elevated suggests that clinically recovery could be meaningful in this instance.     However, Mr Adams's severely reduced ejection fraction in this acute setting, with an very low cardiac output despite volume optimization, will pose a problem for the acute luke-procedural risks of undergoing a Mitraclip. I would instead recommend abstinence from alcohol and heart failure and atrial fibrillation medication optimization for 6 weeks or so with repeat echocardiography. I am hopeful that his systolic function will improve considerably, as has happened in the past. At that time, he can undergo a Mitraclip procedure or possibly even a surgical repair/replacement with a much more favorable procedural risk.      Please do not hesitate to contact me with questions or concerns. Thank you for allowing us to participate in Mr Adams's care. Patient seen and care plan has been discussed with attending physician Dr. Frank Aranda.     Adriana Phipps MD  Fellow, Advanced Interventional Cardiology and Structural Heart Disease      Patient seen and examined with Cardiovascular fellow and agree with the assessment and plan  described above.     Frank Aranda M.D.  Interventional Cardiology  [facility]

## 2021-01-26 NOTE — PLAN OF CARE
D: Patient admitted from OSH on 1/21/21 for management of newly diagnosed cardiomyopathy and surgical consideration of known severe mitral regurgitation.    I/A: Monitored vitals and assessed patient status. A0x4. VSS. Rhythm A-fib 90's-110's. Afebrile. No complaints of pain. Up at janelle. Urinating adequately, tea-colored. Primary team notified.    Completed: Right heart cath and coronary angiogram   Running: Heparin restarted at 850 U/hr. Next 10a at 2330.    P: Continue to monitor patient status and report changes to treatment team.

## 2021-01-26 NOTE — PROGRESS NOTES
[facility]     Cardiology Progress Note- Cards 2        Date of Admission:  1/21/2021     Assessment & Plan: S        Cordell Adams Jr. is a 55 year old male with history of severe MR 2/2 flail P1 posterior mitral leaflet, CAD (s/p PCI to pLAD in 2019), pAF on Eliquis, alcohol use disorder c/b withdrawal seizures, and PE (Nov 2019) transferred from [facility] on 1/21/2021 for management of newly diagnosed cardiomyopathy (LVEF ~15-20%) and surgical consideration of known severe mitral regurgitation.     Today:  - PO lasix 20mg daily  - Start Hydralazine 25mg q8h  - Ongoing discussion with Structural and CVTS teams to determine MR treatment plan      # Acute decompensated HFrEF, NYHA functional class IV, Stage C  # ICMP & NICM 2/2 alcohol abuse and possible tachycardia induced CM 2/2 Afib w/ RVR  # LV systolic dysfunction (LVEF 15-20%)  # CAD s/p PCI to [facility] (2019)  ADHFrEF likely non-ischemic 2/2 alcohol vs tachycardic induced vs valvular.   - Last MENDEL 1/22/2021:  severely reduced LV function (EF 15-20%) w/ severe diffuse hypokinesis; mild RVSD w/ mild dilation; severe MR w/ a flail posterior mitral leaflet (p2 scallop) - ERO 0.61 cm^2 w/ systolic venous reversal in R sided veins. No significant TR present.   - RHC 1/25/2021:  RA 7, RV 32/8, PA 34/23 (18), W 15, Lazarus CO/CI 3.51/1.88, PVR 1, SVR 1459  - Coronary Angiogram 1/25/2021: no hemodynamically significant stenosis. Mid LAD ANGELICA is widely patent.  - Fluid status: appears euvolemic.  - Diuresis: PO lasix 20mg daily  - Ionotrope: none  - Afterload reduction: start hydralazine 25mg q8H, hold for MAP <65  - Beta blocker: Toprol XL 50 BID  - Aldosterone antagonist: holding for now   - Antiplatelet/statin: On ASA 81 mg; holding lipitor given poor liver function.  - SCD prophylaxis: none  - Advanced therapy: not a candidate currently due to alcohol use.   - Strict I/Os  - Daily weights   - 2L/2gm Na  restricted diet  - Follow BMP to keep K>4, Mg >2 with lyte replacement protocol    # Severe mitral regurgitation  2/2 flail posterior mitral leaflet. ERO 0.61 cm^2 w/ systolic venous reversal in R sided veins. LV is not markedly dilated (5.5cm) which is reassuring for recovery pending treatment of MR, afib and alcohol cessation. Planned previously for surgical repair following large subcutaneous cyst removal however lost to follow up. It is unclear currently if he would tolerate complete repair given markedly reduced LVEF- may have difficulty coming off bypass. Other option would be a mitral clip. Continuing discussion with Structural Heart Team and CVTS.   - Medical management as above  - Ongoing discussion with Structural and CVTS teams to determine MR treatment plan    # Paroxysmal Afib with RVR, currently relatively rate controlled (-110/min)  Rates at OSH 110s-180s recently, likely 2/2 alcohol withdrawal, decompensated heart failure and aspiration pneumonia.  Prior to hospitalization was prescribed Amiodarone 200mg, Metoprolol succinate 50mg [but patient was not taking]. Cardioverted once in 2018 in setting of etoh withdrawal. Subsequently failed attempts x2 in 2019 in setting of CHF and severe MR however spontaneously self-converted after dig loading and starting on Amio. CHADSVASC is a minimum of 2 (CHF, MI) FT4 was elevated at 1.78 (1.46 ULN), though difficult to interpret in acute setting. Currently rates acceptable around low 100s. Repeat echo without evidence of thrombus. No plans to cardiovert currently.   - Continue metoprolol 50 mg BID   - Holding off on digoxin and amiodarone for now  - Continue anticoagulation w/ heparin gtt.      # Alcoholic hepatitis  # Direct hyperbilirubinemia  # Coagulopathy  Hepatocellular injury pattern likely related to alcohol use compounded by congestive hepatopathy. Abdominal US with hepatic steatosis but no contour nodularity or solid mass to suggest cirrhosis or  HCC. Hepatitis panel, HIV negative. LFTs downtrending with diuresis/euvolemic state.    - Daily CMP  - HF management as above      # Alcohol use disorder  EtOH 168 mg/dL at OSH. Last drink was 1/16. Requiring PO diazepam at OSH for withdrawal. CD consult at OSH offered counseling and resource information. No longer appears to be going through withdrawal. Last Benzos on 1/22 AM.   - Thiamine/folate      # Acute hypoxic respiratory failure, resolved  Per chart, developed this in OSH. Likely related to fluid overload given resuscitation in ED and maintenance fluids to treat CHRISTOPHER. Improved with lasix gtt and patient was weaned to room air on 1/18. Procalcitonin was elevated 1/19 and he was started on ceftriaxone for possible aspiration pneumonia, completed 1/19-1/23. At this point, he is not having infectious symptoms.   - S/p Ceftriaxone 2g q24 1/19-1/23  - CBC q3 days     # Upper extremity blood pressure differential  Notified by RN that the patient has had a persistent discrepancy in BP readings: L arm is consistently <20mmHg compared with right. Patient asymptomatic. Initial BP measurements in L arm, however, were WNL. BUE arterial duplex without evidence of PAD.      # Prediabetes   A1c 5.7 at OSH. Was hyperglycemic intermittently.   - BID, HS BG; if high, can consider sliding scale insulin (was on this at OSH)     # Hypervolemic hyponatremia, resolved  129 on admission, 135 prior to transfer. Improved with diuresis.      # Hx PE  Diagnosed in 2018. Noncompliance with apixaban due to cost. Was started on heparin gtt at OSH due to high suspicion for PE (d-dimer elevated but CTPA not performed due to CHRISTOPHER). At this point, do not have a high suspicion for PE.   - Anticoagulation for afib as above  - Likely resume apixaban at discharge if low cost can be confirmed     # CHRISTOPHER, resolved  Cr was 2 1/16, baseline of 1. Likely 2/2 cardiorenal in light of the above. Improved with diuresis. UA from OSH unremarkable.       # R Anterior chest wall sebaceous cyst  Pt was seen in 02/2020 to discuss mitral valve repair, and at that time, was referred to general surgery for removal of an anterior chest wall sebaceous cyst. cyst has been present for 12-15 years, and has enlarged slowly over time. Was planning to have cyst removed by general surgery 1/20 with MAC after holding heparin gtt 6 hrs. But this was cancelled in light of transfer  - CVTS aware                                                                                                                                                                                                                                 DVT Prophylaxis: On therapeutic AC with UFH  Turner Catheter: not present  Code Status: Full  Lines: peripheral IVs    Disposition Plan   Expected discharge: ~5 days, recommended to transitional care unit once advanced heart failure work-up completed.    Entered: Sarkis Solo MD 01/26/2021, 1:15 PM        The patient's care was discussed with the Attending Physician, Dr. Leny Wade .    Sarkis Solo MD  [facility]   Please see sign in/sign out for up to date coverage information  ______________________________________________________________________    Interval History    NAEO. Not complaints this morning other than mild ARGUELLO at baseline. No palpitations, chest pain or leg swelling. Anxious to hear about surgical treatment plan going forward.      Data reviewed today: I reviewed all medications, new labs and imaging results over the last 24 hours. I personally reviewed     Physical Exam   Vital Signs: Temp: 98.3  F (36.8  C) Temp src: Oral BP: (!) 86/73 Pulse: 99   Resp: 16 SpO2: 99 % O2 Device: None (Room air)    Weight: 166 lbs 3.2 oz  General Appearance: Comfortably lying in bed, NAD  HEENT: no conjunctival icterus   Respiratory: CTA b/l  Cardiovascular: irregularly irregular, 3/6 systolic murmur, no JVD, no  peripheral edema   GI: abdomen is soft, NTND  Skin: no concerning rashes or lesions   Neuro: A&Ox3, no asterixis, no tremors   MSK: Large cyst on R anterior chest wall    Data   Recent Labs   Lab 01/26/21  0512 01/25/21  0442 01/24/21  0637 01/21/21  2152 01/21/21  2152 01/21/21  1845   WBC  --   --  5.7  --  5.6 6.5   HGB  --   --  15.9  --  16.0 16.8   MCV  --   --  99  --  99 97   PLT  --   --  156  --  166 183   INR  --  1.02  --   --   --  1.23*    132* 133   < >  --  132*   POTASSIUM 4.6 4.4 4.5   < >  --  4.2   CHLORIDE 109 105 104   < >  --  101   CO2 23 22 24   < >  --  24   BUN 24 27 29   < >  --  34*   CR 0.98 1.21 1.07   < >  --  1.15   ANIONGAP 5 5 6   < >  --  8   BRAD 8.4* 8.6 8.8   < >  --  8.8   * 91 106*   < >  --  115*   ALBUMIN 2.7* 2.7* 2.7*   < >  --  2.7*   PROTTOTAL 5.8* 6.1* 6.0*   < >  --  6.1*   BILITOTAL 2.2* 2.6* 3.9*   < >  --  6.1*   ALKPHOS 117 152* 160*   < >  --  161*   * 161* 156*   < >  --  242*   AST 73* 90* 72*   < >  --  143*    < > = values in this interval not displayed.     No results found for this or any previous visit (from the past 24 hour(s)).

## 2021-01-26 NOTE — PLAN OF CARE
D: Patient transferred from [facility] 1/21 for further management of newly diagnosed CM (LVEF 15-20%) and surgical consultation of known severe MR. Hx. MR 2/2 flail P1 posterior mitral leaflet, CAD (s/p PCI to pLAD 2019), pAF on eliquis, alcohol use disorder c/b withdrawal seizures and PE (11/2019).  I/A: A&Ox4. VSS on RA. Afebrile. ARGUELLO. Afib 90s-140s. Denies pain. R radial site wnl, soft/no hematoma, cms intact. Heparin gtt therapeutic and infusing at 850 units/hr, 10a recheck with AM labs.  at HS. Adequate uop. Up independently. Appeared to rest comfortably overnight.   P: Continue to monitor and notify Cards 2 with questions/concerns.

## 2021-01-27 LAB
ALBUMIN SERPL-MCNC: 2.8 G/DL (ref 3.4–5)
ALP SERPL-CCNC: 113 U/L (ref 40–150)
ALT SERPL W P-5'-P-CCNC: 137 U/L (ref 0–70)
ANION GAP SERPL CALCULATED.3IONS-SCNC: 6 MMOL/L (ref 3–14)
AST SERPL W P-5'-P-CCNC: 70 U/L (ref 0–45)
BILIRUB SERPL-MCNC: 1.9 MG/DL (ref 0.2–1.3)
BUN SERPL-MCNC: 20 MG/DL (ref 7–30)
CALCIUM SERPL-MCNC: 8.8 MG/DL (ref 8.5–10.1)
CHLORIDE SERPL-SCNC: 109 MMOL/L (ref 94–109)
CO2 SERPL-SCNC: 21 MMOL/L (ref 20–32)
CREAT SERPL-MCNC: 1.06 MG/DL (ref 0.66–1.25)
ERYTHROCYTE [DISTWIDTH] IN BLOOD BY AUTOMATED COUNT: 15.7 % (ref 10–15)
GFR SERPL CREATININE-BSD FRML MDRD: 80 ML/MIN/{1.73_M2}
GLUCOSE BLDC GLUCOMTR-MCNC: 128 MG/DL (ref 70–99)
GLUCOSE BLDC GLUCOMTR-MCNC: 95 MG/DL (ref 70–99)
GLUCOSE SERPL-MCNC: 97 MG/DL (ref 70–99)
HCT VFR BLD AUTO: 45.3 % (ref 40–53)
HGB BLD-MCNC: 15 G/DL (ref 13.3–17.7)
LACTATE BLD-SCNC: 0.7 MMOL/L (ref 0.7–2)
MCH RBC QN AUTO: 34 PG (ref 26.5–33)
MCHC RBC AUTO-ENTMCNC: 33.1 G/DL (ref 31.5–36.5)
MCV RBC AUTO: 103 FL (ref 78–100)
PLATELET # BLD AUTO: 154 10E9/L (ref 150–450)
POTASSIUM SERPL-SCNC: 4.5 MMOL/L (ref 3.4–5.3)
PROT SERPL-MCNC: 6 G/DL (ref 6.8–8.8)
RBC # BLD AUTO: 4.41 10E12/L (ref 4.4–5.9)
SODIUM SERPL-SCNC: 136 MMOL/L (ref 133–144)
UFH PPP CHRO-ACNC: 0.35 IU/ML
WBC # BLD AUTO: 3.8 10E9/L (ref 4–11)

## 2021-01-27 PROCEDURE — 36415 COLL VENOUS BLD VENIPUNCTURE: CPT | Performed by: STUDENT IN AN ORGANIZED HEALTH CARE EDUCATION/TRAINING PROGRAM

## 2021-01-27 PROCEDURE — 250N000013 HC RX MED GY IP 250 OP 250 PS 637: Performed by: INTERNAL MEDICINE

## 2021-01-27 PROCEDURE — 80053 COMPREHEN METABOLIC PANEL: CPT | Performed by: STUDENT IN AN ORGANIZED HEALTH CARE EDUCATION/TRAINING PROGRAM

## 2021-01-27 PROCEDURE — 36415 COLL VENOUS BLD VENIPUNCTURE: CPT | Performed by: INTERNAL MEDICINE

## 2021-01-27 PROCEDURE — 250N000013 HC RX MED GY IP 250 OP 250 PS 637: Performed by: STUDENT IN AN ORGANIZED HEALTH CARE EDUCATION/TRAINING PROGRAM

## 2021-01-27 PROCEDURE — 83605 ASSAY OF LACTIC ACID: CPT | Performed by: INTERNAL MEDICINE

## 2021-01-27 PROCEDURE — 99233 SBSQ HOSP IP/OBS HIGH 50: CPT | Mod: GC | Performed by: INTERNAL MEDICINE

## 2021-01-27 PROCEDURE — 999N001017 HC STATISTIC GLUCOSE BY METER IP

## 2021-01-27 PROCEDURE — 85520 HEPARIN ASSAY: CPT | Performed by: STUDENT IN AN ORGANIZED HEALTH CARE EDUCATION/TRAINING PROGRAM

## 2021-01-27 PROCEDURE — 214N000001 HC R&B CCU UMMC

## 2021-01-27 PROCEDURE — 85027 COMPLETE CBC AUTOMATED: CPT | Performed by: STUDENT IN AN ORGANIZED HEALTH CARE EDUCATION/TRAINING PROGRAM

## 2021-01-27 RX ORDER — ATORVASTATIN CALCIUM 40 MG/1
40 TABLET, FILM COATED ORAL EVERY EVENING
Status: DISCONTINUED | OUTPATIENT
Start: 2021-01-27 | End: 2021-01-28 | Stop reason: HOSPADM

## 2021-01-27 RX ORDER — METOPROLOL SUCCINATE 50 MG/1
50 TABLET, EXTENDED RELEASE ORAL DAILY
Status: DISCONTINUED | OUTPATIENT
Start: 2021-01-28 | End: 2021-01-28 | Stop reason: HOSPADM

## 2021-01-27 RX ADMIN — FOLIC ACID 1 MG: 1 TABLET ORAL at 08:20

## 2021-01-27 RX ADMIN — THIAMINE HCL TAB 100 MG 100 MG: 100 TAB at 08:29

## 2021-01-27 RX ADMIN — ATORVASTATIN CALCIUM 40 MG: 40 TABLET, FILM COATED ORAL at 19:37

## 2021-01-27 RX ADMIN — DOCUSATE SODIUM 50 MG AND SENNOSIDES 8.6 MG 1 TABLET: 8.6; 5 TABLET, FILM COATED ORAL at 08:20

## 2021-01-27 RX ADMIN — MULTIPLE VITAMINS W/ MINERALS TAB 1 TABLET: TAB at 08:20

## 2021-01-27 RX ADMIN — RIVAROXABAN 20 MG: 20 TABLET, FILM COATED ORAL at 16:44

## 2021-01-27 RX ADMIN — Medication 6.25 MG: at 13:27

## 2021-01-27 RX ADMIN — Medication 6.25 MG: at 19:37

## 2021-01-27 RX ADMIN — METOPROLOL SUCCINATE 50 MG: 50 TABLET, EXTENDED RELEASE ORAL at 08:21

## 2021-01-27 RX ADMIN — AMIODARONE HYDROCHLORIDE 200 MG: 200 TABLET ORAL at 19:37

## 2021-01-27 RX ADMIN — AMIODARONE HYDROCHLORIDE 200 MG: 200 TABLET ORAL at 08:20

## 2021-01-27 RX ADMIN — FUROSEMIDE 20 MG: 20 TABLET ORAL at 08:20

## 2021-01-27 RX ADMIN — ASPIRIN 81 MG: 81 TABLET, CHEWABLE ORAL at 08:20

## 2021-01-27 ASSESSMENT — ACTIVITIES OF DAILY LIVING (ADL)
ADLS_ACUITY_SCORE: 14

## 2021-01-27 ASSESSMENT — MIFFLIN-ST. JEOR: SCORE: 1557.05

## 2021-01-27 NOTE — PROGRESS NOTES
[facility]     Cardiology Progress Note- Cards 2        Date of Admission:  1/21/2021     Assessment & Plan: S        Cordell Adams Jr. is a 55 year old male with history of severe MR 2/2 flail P1 posterior mitral leaflet, CAD (s/p PCI to pLAD in 2019), pAF on Eliquis pta, alcohol use disorder c/b withdrawal seizures, and PE (Nov 2019) transferred from [facility] on 1/21/2021 for management of newly diagnosed cardiomyopathy (LVEF ~15-20%) and surgical consideration of known severe mitral regurgitation.     Today:  - Start Amiodarone 200mg BID   - Start Captopril 6.25 TID  - Start Xarelto 20mg daily   - Stop Heparin gtt 1 hr after given Xarelto ~1800  - Stop Hydralazine  - Decrease Toprol XL to 50mg daily from BID dosing        # Acute decompensated HFrEF, NYHA functional class IV, Stage C  # ICMP & NICM 2/2 etoh vs afib with RVR   # LV systolic dysfunction (LVEF 15-20%)  # CAD s/p PCI to pLAD (2019)  ADHFrEF likely non-ischemic 2/2 alcohol and/or afib with RVR.  - Last MENDEL 1/22/2021:  severely reduced LV function (EF 15-20%) w/ severe diffuse hypokinesis; mild RVSD w/ mild dilation; severe MR w/ a flail posterior mitral leaflet (p2 scallop) - ERO 0.61 cm^2 w/ systolic venous reversal in R sided veins. No significant TR present.   - RHC 1/25/2021:  RA 7, RV 32/8, PA 34/23 (18), W 15, Lazarus CO/CI 3.51/1.88, PVR 1, SVR 1459  - Coronary Angiogram 1/25/2021: no hemodynamically significant stenosis. Mid LAD ANGELICA is widely patent.  - Fluid status: appears euvolemic.  - Diuresis: PO lasix 20mg daily  - Ionotrope: none  - Afterload reduction: start captopril 6.25mg TID, stop hydralazine   - Beta blocker: Decrease Toprol XL to 50mg daily from BID  - Aldosterone antagonist: holding for now given limited BP room   - Antiplatelet/statin: On ASA 81 mg; Resume Atorvastatin 40 given improved LFTs.  - SCD prophylaxis: none  - Advanced therapy: not a candidate currently  due to alcohol use.   - Strict I/Os  - Daily weights   - 2L/2gm Na restricted diet   - Follow BMP to keep K>4, Mg >2 with lyte replacement protocol    # Severe mitral regurgitation  2/2 flail posterior mitral leaflet. ERO 0.61 cm^2 w/ systolic venous reversal in R sided veins. LV is not markedly dilated (5.5cm) which is reassuring for recovery pending treatment of MR, afib and alcohol cessation. Planned previously for surgical repair following large subcutaneous cyst removal however lost to follow up. It is unclear currently if he would tolerate complete repair given markedly reduced LVEF- may have difficulty coming off bypass. Other option would be a mitral clip. Seen by [facility]ventional Cards team who recommends afib optimization and alcohol cessation for 6 weeks followed by repeat echocardiogram in hopes of improved systolic function at that time making for more favorable reduced procedural risk.    - Medical management as above  - Will need close follow up in [facility] with Dr Wade      # Paroxysmal Afib with RVR, currently relatively rate controlled (-110/min)  Rates at OSH 110s-180s recently, likely 2/2 alcohol withdrawal, decompensated heart failure and aspiration pneumonia.  Prior to hospitalization was prescribed Amiodarone 200mg, Metoprolol succinate 50mg [but patient was not taking]. Cardioverted once in 2018 in setting of etoh withdrawal. Subsequently failed attempts x2 in 2019 in setting of CHF and severe MR however spontaneously self-converted after dig loading and starting on Amio. CHADSVASC is a minimum of 2 (CHF, MI) FT4 was elevated at 1.78 (1.46 ULN), though difficult to interpret in acute setting. Currently rates acceptable around low 100s. Repeat echo without evidence of thrombus. No plans to cardiovert currently.   - Start Amiodarone 200mg BID   - Decrease Toprol XL to 50 mg daily   - Start Xarelto 20mg daily (scheduled for 1700)  - Discontinue heparin gtt 1 hour  after given Xarelto ~1800     # Alcoholic hepatitis  # Direct hyperbilirubinemia  # Coagulopathy  Hepatocellular injury pattern likely related to alcohol use compounded by congestive hepatopathy. Abdominal US with hepatic steatosis but no contour nodularity or solid mass to suggest cirrhosis or HCC. Hepatitis panel, HIV negative. LFTs downtrending with diuresis/euvolemic state.    - Daily CMP  - HF management as above   - Alcohol cessation counseling  - Okay to restart Lipitor      # Alcohol use disorder  EtOH 168 mg/dL at OSH. Last drink was 1/16. Requiring PO diazepam at OSH for withdrawal. CD consult at OSH offered counseling and resource information. No longer appears to be going through withdrawal. Last Benzos on 1/22 AM.   - Thiamine/folate   - Alcohol cessation counseling   - Refusing Chem Dep follow up currently      # Acute hypoxic respiratory failure, resolved  Per chart, developed this in OSH. Likely related to fluid overload given resuscitation in ED and maintenance fluids to treat CHRISTOPHER. Improved with lasix gtt and patient was weaned to room air on 1/18. Procalcitonin was elevated 1/19 and he was started on ceftriaxone for possible aspiration pneumonia, completed 1/19-1/23. At this point, he is not having infectious symptoms.   - S/p Ceftriaxone 2g q24 1/19-1/23  - CBC q3 days     # Upper extremity blood pressure differential  Notified by RN that the patient has had a persistent discrepancy in BP readings: L arm is consistently <20mmHg compared with right. Patient asymptomatic. Initial BP measurements in L arm, however, were WNL. BUE arterial duplex without evidence of PAD.      # Prediabetes   A1c 5.7 at OSH. Was hyperglycemic intermittently.   - BID, HS BG; if high, can consider sliding scale insulin (was on this at OSH)     # Hypervolemic hyponatremia, resolved  129 on admission, 135 prior to transfer. Improved with diuresis.      # Hx PE  Diagnosed in 2018. Noncompliance with apixaban due to cost. Was  started on heparin gtt at OSH due to high suspicion for PE (d-dimer elevated but CTPA not performed due to CHRISTOPHER). At this point, do not have a high suspicion for PE. Discussed with pharm liaison, he has MA which does not cover Eliquis but only 3$ co-pay for Xarelto.  - Start Xarleto 20mg daily this evening at 1700  - Stop heparin gtt 1 hr after given Xarelto     # CHRISTOPHER, resolved  Cr was 2 1/16, baseline of 1. Likely 2/2 cardiorenal in light of the above. Improved with diuresis. UA from OSH unremarkable.      # R Anterior chest wall sebaceous cyst  Pt was seen in 02/2020 to discuss mitral valve repair, and at that time, was referred to general surgery for removal of an anterior chest wall sebaceous cyst. cyst has been present for 12-15 years, and has enlarged slowly over time. Was planning to have cyst removed by general surgery 1/20 with MAC after holding heparin gtt 6 hrs. But this was cancelled in light of transfer  - CVTS aware                                                                                                                                                                                                            DVT Prophylaxis: On therapeutic AC with UFH. Starting Xarelto tonight.   Turner Catheter: not present  Code Status: Full  Lines: peripheral IVs    Disposition Plan   Expected discharge: 1/28/2020 to home. Will need close follow up with [facility], Dr Wade.      Entered: Sarkis Solo MD 01/27/2021, 12:07 PM        The patient's care was discussed with the Attending Physician, Dr. Leny Wade .    Sarkis Solo MD  [facility]   Please see sign in/sign out for up to date coverage information  ______________________________________________________________________    Interval History    NAEO. Triggered sepsis protocol this morning for tachycardia 120s (baseline) and BP 90/58 with MAP 59. Patient asx during this time. Recheck /87  (94) R arm and 109/78 (88) L arm. Lactate 0.7. No fevers, chills, chest pain, abdominal pain or any other issues.     Data reviewed today: I reviewed all medications, new labs and imaging results over the last 24 hours. I personally reviewed     Physical Exam   Vital Signs: Temp: 97.7  F (36.5  C) Temp src: Oral BP: 110/81 Pulse: 108   Resp: 16 SpO2: 99 % O2 Device: None (Room air)    Weight: 166 lbs 1.6 oz  General Appearance: Comfortably lying in bed, NAD, appears frustrated this morning  HEENT: no conjunctival icterus   Respiratory: CTA b/l  Cardiovascular: irregularly irregular, 3/6 systolic murmur, no JVD, no peripheral edema   GI: abdomen is soft, NTND  Skin: no concerning rashes or lesions   Neuro: A&Ox3, no asterixis, no tremors   MSK: Large cyst on R anterior chest wall    Data   Recent Labs   Lab 01/27/21  0547 01/26/21  0512 01/25/21  0442 01/24/21  0637 01/21/21  2152 01/21/21  2152 01/21/21  1845   WBC 3.8*  --   --  5.7  --  5.6 6.5   HGB 15.0  --   --  15.9  --  16.0 16.8   *  --   --  99  --  99 97     --   --  156  --  166 183   INR  --   --  1.02  --   --   --  1.23*    136 132* 133   < >  --  132*   POTASSIUM 4.5 4.6 4.4 4.5   < >  --  4.2   CHLORIDE 109 109 105 104   < >  --  101   CO2 21 23 22 24   < >  --  24   BUN 20 24 27 29   < >  --  34*   CR 1.06 0.98 1.21 1.07   < >  --  1.15   ANIONGAP 6 5 5 6   < >  --  8   BRAD 8.8 8.4* 8.6 8.8   < >  --  8.8   GLC 97 112* 91 106*   < >  --  115*   ALBUMIN 2.8* 2.7* 2.7* 2.7*   < >  --  2.7*   PROTTOTAL 6.0* 5.8* 6.1* 6.0*   < >  --  6.1*   BILITOTAL 1.9* 2.2* 2.6* 3.9*   < >  --  6.1*   ALKPHOS 113 117 152* 160*   < >  --  161*   * 137* 161* 156*   < >  --  242*   AST 70* 73* 90* 72*   < >  --  143*    < > = values in this interval not displayed.     No results found for this or any previous visit (from the past 24 hour(s)).

## 2021-01-27 NOTE — PLAN OF CARE
Neuro: A&Ox4.   Cardiac: Afib hr . VSS.   Respiratory: Sating 100% on RA.  GI/: Adequate urine output. BM X1  Diet/appetite: Tolerating low fat diet/1.5L FR diet. Eating well.   Activity:  Independent in room  Pain: Denies pain-   Skin: Sclera yellow. Right chest large cyst.   LDA's: PIV x 2- hep gtt discontinue at 1740- started Xarelto     Plan: Continue with POC. Notify primary team with changes. Discharge tomorrow.       Problem: Adult Inpatient Plan of Care  Goal: Plan of Care Review  Outcome: No Change  Goal: Patient-Specific Goal (Individualized)  Outcome: No Change  Goal: Optimal Comfort and Wellbeing  Outcome: No Change  Goal: Readiness for Transition of Care  Outcome: No Change

## 2021-01-27 NOTE — PLAN OF CARE
D: Admitted 1/21 from OSH for management of newly diagnosed CM and surgical consideration of severe MR. Hx of severe MR 2/2 flail P1 posterior mitral leaflet, CAD (s/p PCI 2019), pAF on Eliquis, alcohol use disorder c/b withdrawal seizures, and PE (Nov 2019).     I: Monitored vitals and assessed pt status.     Running: Heparin 850 units/hr       A: A0x4. Flat affect, able to express needs. Afib in 80s-110s. Soft blood pressures in low 90s-100s systolic. Room air. Afebrile. Urinating adequately. Up independently. Last BM 1/26 per patient. 2g Na+ and 1.5 L fluid restriction. Right radial site WDL, CMS intact.     P: Continue to monitor Pt status and report changes to Cards 2.

## 2021-01-27 NOTE — PROGRESS NOTES
"/72 (BP Location: Left arm)   Pulse 104   Temp 97.8  F (36.6  C) (Oral)   Resp 16   Ht 1.702 m (5' 7\")   Wt 75.4 kg (166 lb 3.2 oz)   SpO2 98%   BMI 26.03 kg/m    Neuro: A&Ox4.   Cardiac: Afebrile, VSS.Hep gtt-therapeutic.   Respiratory: RA   GI/: Voiding spontaneously. No BM this shift.   Diet/appetite: Tolerating  diet. Denies nausea   Activity:Independent   Pain: Denies   Skin: No new deficits noted.  Lines:PIV  Drains:None  No new complaints.Will continue to monitor and follow plan of care.       "

## 2021-01-27 NOTE — DISCHARGE SUMMARY
[facility]     Cardiology Discharge Summary- Cards 2         Date of Admission:  1/21/2021  Date of Discharge:  1/28/2021  Discharging Provider: Sarkis Solo (PGY1) and Leny Wade (Staff)    Discharge Diagnoses   - Acute Decompensated HFrEF, NYHA Class IV, Stage C  - ICMP and NICM 2/2 ETOH vs Afib with RVR  - LV systolic dysfunction (LVEF 15-20%)  - CAD s/p PCI to pLAD 2019  - Severe Mitral Regurgitation 2/2 flail posterior mitral leaflet   - Atrial fibrillation with RVR   - Alcoholic Hepatitis   - Alcohol Use Disorder  - Hyponatremia   - CHRISTOPHER  - Right Anterior Chest Wall Sebaceous Cyst     Follow-ups Needed After Discharge   - Follow up with [facility] in 5-7 days. Need CMP, CBC and Mag prior (in 4-5 days)    - Follow up with [facility] in 1 month for repeat echocardiogram.    - Follow up with Dr Wade in 1 month.     - Follow up with your PCP in 2-3 weeks.     - Encouraged to further consider chemical dependency to assist in complete alcohol cessation (declines at this time).     Discharge Disposition   Discharged to home  Condition at discharge: Stable    Hospital Course   Cordell Yamary Moody is a 55 year old male with history of severe MR 2/2 flail P1 posterior mitral leaflet, CAD (s/p PCI to pLAD in 2019), pAF on Eliquis pta, alcohol use disorder c/b withdrawal seizures, and PE (Nov 2019) who was transferred from [facility] on 1/21/2021 for management of newly diagnosed cardiomyopathy (LVEF ~15-20%) and surgical consideration of known severe mitral regurgitation. Please see the H&P dating 1/21/2021 for further information regarding his presentation. He has been managed during his hospital stay as follows:       # Acute decompensated HFrEF, NYHA functional class IV, Stage C  # ICMP & NICM 2/2 etoh vs afib with RVR   # LV systolic dysfunction (LVEF 15-20%)  # CAD s/p PCI to pLAD  (2019)  Patient presented with acute decompensated HFrEF in the setting of afib with RVR, alcohol intoxication and volume overload. An echocardiogram was obtained and showed severely reduced LV function (EF 15-20%) w/ severe diffuse hypokinesis, mild RVSD w/ mild dilation, and severe MR w/ a flail posterior mitral leaflet (p2 scallop). He was diuresed aggressively. A right heart cath was obtained on 1/25 and showed relatively normal R sided filling pressures, a marginally elevated wedge of 15, normal PVR of 1, significantly reduced CO/CI of 3.5/1.88 and high SVR of 1459. Coronary angiogram was also performed and showed no hemodynamically significant stenosis. It is felt that his acute biventricular heart failure is secondary to alcohol and atrial fibrillation. Euvolemia was achieved and has been maintained with 20mg PO lasix daily. Discharge weight is 166#. Additionally, his Toprol XL was increased to 50mg daily and he was started on Amiodarone. He remains in atrial fibrillation at this time however rates have come down to low 100s over the last several days. Finally, he was started on a small dose of captopril at 6.25mg TID in place of PTA lisinopril given his blood pressures are soft in the 90s systolic and this is a shorter acting medication. He will need to follow up closely in [facility] over the next 5-7 days for repeat labs.       # Severe mitral regurgitation  2/2 flail posterior mitral leaflet. ERO 0.61 cm^2 w/ systolic venous reversal in R sided veins. His LV is not markedly dilated (5.5cm) which is reassuring for recovery pending treatment of his afib and alcohol cessation. Planned previously for surgical repair following large subcutaneous cyst removal however lost to follow up. [facility], he was seen by interventional cardiology and structural cardiology. There is concern at this time that he would tolerate complete surgical open repair given markedly reduced LVEF- may have  difficulty coming off bypass. Other option would be a mitral clip. However, it is recommended first to repeat an echocardiogram in 1 month after ongoing complete alcohol cessation and while better optimzing afib in hopes that his LVEF will recover some and make him a lower risk surgical candidate. Decision for repair can be made following.     # Paroxysmal Afib with RVR, currently relatively rate controlled (-110/min)  Rates at OSH 110s-180s recently, likely 2/2 alcohol withdrawal, decompensated heart failure and aspiration pneumonia.  Prior to hospitalization was prescribed Amiodarone 200mg, Metoprolol succinate 50mg [but patient was not taking]. Cardioverted once in 2018 in setting of etoh withdrawal. Subsequently failed attempts x2 in 2019 in setting of CHF and severe MR however spontaneously self-converted after dig loading and starting on Amio. CHADSVASC is a minimum of 2 (CHF, MI) FT4 was elevated at 1.78 (1.46 ULN), though difficult to interpret in acute setting. Decision was made not to pursue electrical cardioversion given failed attempts previously and low liklihood of sustained conversion with severe MR. His Toprol was increased to 50mg daily and he was started on Amiodarone 200mg BID. His rates have remained stable in low 100s. He is to follow up in [facility] clinic in the next week to titrate meds further as felt necessary. He has been instructed on complete alcohol cessation and declines chemical dependency at this time. Lastely, he was switched from PTA Apixaban which his MA does not cover, to Xarelto.   - Continue Amiodarone 200mg BID   - Toprol XL 50 mg daily   - Xarelto 20mg daily     # Alcoholic hepatitis  # Direct hyperbilirubinemia  # Coagulopathy  Hepatocellular injury pattern likely related to alcohol use compounded by congestive hepatopathy. Abdominal US with hepatic steatosis but no contour nodularity or solid mass to suggest cirrhosis or HCC. Hepatitis panel, HIV negative. LFTs  downtrending with diuresis/euvolemic state.    - CMP in 3-5 days   - HF management as above   - Alcohol cessation counseling  - Lipitor restarted prior to discharge      # Alcohol use disorder  EtOH 168 mg/dL at OSH. Last drink was 1/16. Requiring PO diazepam at OSH for withdrawal. CD consult at OSH offered counseling and resource information. No longer appears to be going through withdrawal. Last Benzos on 1/22 AM. Completed 5 days of thiamine.   - Continue folic acid supplementation    - Alcohol cessation counseling   - Refusing Chem Dep follow up currently      # Upper extremity blood pressure differential  Notified by RN that the patient has had a persistent discrepancy in BP readings: L arm is consistently <20mmHg compared with right. Patient asymptomatic. Initial BP measurements in L arm, however, were WNL. BUE arterial duplex without evidence of PAD.      # Prediabetes   A1c 5.7 at OSH. Was hyperglycemic intermittently which was treated with sliding scale insulin.      # Hypervolemic hyponatremia, resolved  129 on admission, 135 prior to transfer. Improved with diuresis.      # Hx PE  Diagnosed in 2018. Noncompliance with apixaban due to cost. Was started on heparin gtt at OSH due to high suspicion for PE (d-dimer elevated but CTPA not performed due to CHRISTOPHER). At this point, do not have a high suspicion for PE. Discussed with pharm liaison, he has MA which does not cover Eliquis but only 3$ co-pay for Xarelto which he has been started on.     # CHRISTOPHER, resolved  Cr was 2 1/16, baseline of 1. Likely 2/2 cardiorenal in light of the above. Improved with diuresis. UA from OSH unremarkable.      # R Anterior chest wall sebaceous cyst  Pt was seen in 02/2020 to discuss mitral valve repair, and at that time, was referred to general surgery for removal of an anterior chest wall sebaceous cyst. cyst has been present for 12-15 years, and has enlarged slowly over time. Was planning to have cyst removed by general surgery  1/20 with MAC after holding heparin gtt 6 hrs. But this was cancelled in light of transfer  - CVTS aware                                                 Consultations This Hospital Stay   PHARMACY IP CONSULT  PHARMACY IP CONSULT  CARDIOTHORACIC SURGERY ADULT IP CONSULT  PHARMACY IP CONSULT  PHARMACY IP CONSULT  CARDIOTHORACIC SURGERY ADULT IP CONSULT  PHARMACY IP CONSULT  PHARMACY IP CONSULT  CARDIOLOGY INTERVENTIONAL (CATH LAB) IP CONSULT   [facility] EVALUATION IP CONSULT  GI HEPATOLOGY ADULT IP CONSULT  SMOKING CESSATION PROGRAM IP CONSULT    Code Status   Full Code        Sarkis Solo MD  [facility]   ______________________________________________________________________    Physical Exam   Vital Signs: Temp: 97.7  F (36.5  C) Temp src: Oral BP: 95/81 Pulse: 104   Resp: 16 SpO2: 100 % O2 Device: None (Room air)    Weight: 166 lbs 1.6 oz  General Appearance:  Comfortably lying in bed, NAD, appears frustrated this morning  HEENT: no conjunctival icterus   Respiratory: CTA b/l  Cardiovascular: irregularly irregular, 3/6 systolic murmur, no JVD, no peripheral edema   GI: abdomen is soft, NTND  Skin: no concerning rashes or lesions   Neuro: A&Ox3, no asterixis, no tremors   MSK: Large cyst on R anterior chest wall       Primary Care Physician   Becca Morton MD    Discharge Orders      Reason for your hospital stay    You were hospitalized at the [facility] for acute systolic heart failure, rapid atrial fibrillation, and severe mitral valve insufficiency. You were diuresed to get extra fluid off of your body. You were seen by several cardiac specialists and surgeons in regards to fixing your mitral valve. We have started you on a medication called amiodarone to help with your atrial fibrillation rate. Your heart rate has come down with this. The plan will be for you to follow up closely with our [facility] over the next several weeks. You will  also need to see our Structural Heart Specialist, Dr Aranda, in 1 month for a repeat echocardiogram of your heart. You will need to refrain from any alcohol whatsoever as this will weaken your heart further and make it harder for it to recover. The hope is that by complete alcohol cessation and controlling your atrial fibrillation with these new medications, that your heart function will get better. Once you have had your repeat echocardiogram, decisions will be made further with your surgeons regarding the best way to fix your mitral valve.     Adult [facility] Follow-up and recommended labs and tests    Follow up with [facility] in 5-7 days. Labs needed prior (in 3-4 days): CBC, CMP, magesium    Follow up with your primary care doctor in 2-3 weeks for hospitalization follow up.     Follow up with Dr. Aranda, at [facility], within 1 month for repeat echocardiogram and evaluation.    Appointments on [place] and/or [facility] (with [facility] or [facility] provider or service). Call [phone] if you haven't heard regarding these appointments within 7 days of discharge.     Follow Up and recommended labs and tests    Repeat labs: CBC, CMP, magnesium in 3-4 days     Activity    Your activity upon discharge: activity as tolerated     When to contact your care team    Call your primary doctor if you have any of the following:  increased shortness of breath, chest pain, lightheadedness, dizziness, feelings of passing out or about to pass out, heart racing, palpitations, increased leg swelling.     Full Code     Diet    Follow this diet upon discharge: Orders Placed This Encounter      Fluid restriction 1500 ML FLUID      2 Gram Sodium Diet       Significant Results and Procedures   Results for orders placed or performed during the hospital encounter of 01/21/21   XR Chest Port 1 View    Narrative    EXAM: XR CHEST PORT 1 VW  1/21/2021 6:44 PM     HISTORY:  ADHF        COMPARISON:  None    TECHNIQUE: Single frontal radiograph of the chest    FINDINGS:     Midline trachea. Well-defined cardiomediastinal silhouette. Distinct  pulmonary vasculature. No consolidation, pleural effusion or  appreciable pneumothorax.      Impression    IMPRESSION: No acute consolidation. Commonly reported radiographic  features of SARS-CoV-2 associated pneumonia are not present.    I have personally reviewed the examination and initial interpretation  and I agree with the findings.    CONNER WICK MD   US Upper Ext Arterial Duplex Bilateral    Narrative    ULTRASOUND UPPER EXTREMITY ARTERIAL DUPLEX BILATERAL 1/22/2021    CLINICAL HISTORY: blood pressure differential > 20mmHg, assess for  PAD.     COMPARISONS: CT chest abdomen pelvis without contrast 1/22/2021    REFERRING PROVIDER: CHRISTOPHER PAGAN    TECHNIQUE: Bilateral central and arm arteries evaluated with  grayscale, color Doppler, and Doppler waveform ultrasound. Cine clip  through the right innominate artery was saved in the patient's record.    FINDINGS: Arrhythmia.    RIGHT:       Innominate artery: 92/0 cm/s, triphasic       Common carotid artery, proximal: 117/17 cm/s, triphasic         Subclavian artery, medial: 121/0 cm/s, triphasic       Subclavian artery, mid: 81 cm/s, triphasic         Axillary artery: 88 cm/s, triphasic         Brachial artery, proximal: 97/0 cm/s, triphasic       Brachial artery, mid: 88/0 cm/s, triphasic       Brachial artery, antecubital fossa: 90/0 cm/s, triphasic         Radial artery, origin: 64/0 cm/s, triphasic       Radial artery, wrist: 60/6 cm/s, triphasic         Ulnar artery, origin: 63/0 cm/s, triphasic       Ulnar artery, wrist: 49/0 cm/s, triphasic    LEFT:       Common carotid artery, proximal: 83/0 cm/s, triphasic         Subclavian artery, medial: 86/0 cm/s, 83/0 cm/s, 95/0 cm/s,  triphasic       Subclavian artery, mid: 105/0 cm/s, triphasic         Axillary artery: 99/0 cm/s, triphasic          Brachial artery, proximal: 72/14 cm/s, triphasic       Brachial artery, mid: 106/0 cm/s, triphasic       Brachial artery, antecubital fossa: 83/17 cm/s, triphasic         Radial artery, origin: 55/0 cm/s, triphasic       Radial artery, wrist: 64/12 cm/s, triphasic         Ulnar artery, origin: 54/0 cm/s, triphasic       Ulnar artery, wrist: 42/7 cm/s, triphasic      Impression    IMPRESSION:  1. Arrhythmia.    2. Etiology of patient's asymmetric blood pressures not demonstrated.  No significant arterial stenosis demonstrated.    ANNA BEACH MD   CT Chest Abdomen Pelvis w/o Contrast    Narrative    EXAMINATION: CT CHEST ABDOMEN PELVIS W/O CONTRAST, 1/22/2021 1:03 PM    TECHNIQUE:  Helical CT images from the thoracic inlet through the  symphysis pubis were obtained  without contrast.     COMPARISON: None    HISTORY: Workup for heart transplant versus LVAD    FINDINGS:    Chest: Apparent gland is normal. Cardiac size is enlarged. No  pericardial effusion. Normal caliber of the aorta and main pulmonary  artery. Conventional branching pattern of the aortic arch. Moderate  coronary artery calcium. No significant atherosclerotic changes of the  thoracic aorta. No axillary or thoracic lymphadenopathy.    The central tracheobronchial tree is patent. Mild bibasilar  atelectasis/scarring. No pleural effusion or pneumothorax. No focal  pulmonary infiltrate. No suspicious pulmonary nodules or masses.    Abdomen and pelvis:   Hypoattenuation of the hepatic parenchyma. Subcentimeter hypodensity  in ectatic segment 6, too small accurately characterize by CT. The  gallbladder biliary system, pancreas, spleen, and right adrenal gland  are normal in appearance. 12 mm nodule in the left adrenal gland with  average Hounsfield units of less than 10, compatible with benign  adrenal adenoma. Normal noncontrast appearance of the kidneys. No  renal calculi or hydronephrosis. Urinary bladder is within normal  limits. Two small diverticula  arising off the posterior bladder wall.  Prostate is normal size with coarse calcifications.    No abnormally dilated loops of small or large bowel. The appendix is  normal. No free fluid or free air in the abdomen. Normal caliber of  the abdominal aorta. No suspicious abdominal or pelvic  lymphadenopathy.    Bones and soft tissues: Small fat-containing umbilical hernia. 5.1 x  3.6 cm cystic lesion anterior to the pectoralis musculature in the  right chest wall. Additional smaller cystic lesion in the posterior  right subcutaneous tissue measuring 2.3 x 1.8 cm 10 (series 2, image  15). Mild degenerative changes of the spine. Degenerative changes of  the hips. No suspicious osseous lesions.      Impression    IMPRESSION:  1. Cardiomegaly with moderate coronary artery calcium in the left  anterior descending artery.  2. Left adrenal adenoma.  3. Two subcutaneous cysts in the right chest wall and right upper  back.     I have personally reviewed the examination and initial interpretation  and I agree with the findings.    RL MONAHAN MD   US Abdomen Limited    Addendum: 1/22/2021    Please disregard resident notes at the beginning of the official  dictation.    JAILENE WEAVER MD      Narrative    just had a ct, liver looks fatty, there is a little cyst, little  biliary sludge, nothing else notable    EXAMINATION: Limited Abdominal Ultrasound, 1/22/2021 2:34 PM     COMPARISON: CT chest abdomen pelvis dated 1/22/2021    HISTORY: Assess for cirrhosis    FINDINGS:   Fluid: No evidence of ascites or pleural effusions.    Liver: The liver demonstrates diffuse slightly hyperechoic parenchyma  with homogenous echotexture, measuring 16.3 cm in craniocaudal  dimension. There is no focal mass. There is a anechoic simple hepatic  cyst located in the posterior aspect of the liver segment 6 and  measures approximately 0.8 x 0.9 x 1.0 cm.    Gallbladder: There is moderate volume of biliary sludge with no wall  thickening,  pericholecystic fluid, positive sonographic Doss's sign  or evidence for cholelithiasis or cholecystitis.    Bile Ducts: Both the intra- and extrahepatic biliary system are of  normal caliber.  The common bile duct measures 3 mm in diameter.    Pancreas: Visualized portions of the head and body of the pancreas are  unremarkable.     Kidney: The right kidney measures 10.0 cm long. There is no  hydronephrosis or hydroureter, no shadowing renal calculi, cystic  lesion or mass.         Impression    IMPRESSION:   1.  Liver parenchyma shows diffuse hyperechoic echotexture, favoring  chronic changes consistent with hepatic steatosis without evidence of  aide cirrhosis.    2.  There is moderate biliary sludge without evidence of cholecystitis  or cholelithiasis.    3.  Solitary benign simple hepatic cyst located in the posterior  aspect of hepatic segment 6 that measures 0.8 x 0.9 x 1.0 cm. No other  focal lesions or masses.    I have personally reviewed the examination and initial interpretation  and I agree with the findings.    JAILENE WEAVER MD   Transesophageal Echocardiogram    [facility]    [id number]  [facility]  [id number]  [id number]^EJ^CHRISTOPHER^EDILSON           [employer] [facility]  Echocardiography Laboratory  [address] [place] [id number]        Name: ANITHA JARAMILLO  MRN: [id number]  : 1965  Study Date: 2021 09:29 AM  Age: 55 yrs  Gender: Male  Patient Location: [facility]  Reason For Study: Mitral Regurgitation  Ordering Physician: CHRISTOPHER PAGAN  Performed By: MD Mohsan Chaudhry     BSA: 1.9 m2  Height: 67 in  Weight: 167 lb  HR: 120  BP: 113/86 mmHg  Attestation  I was present during MENDEL probe placement by the fellow, Mohsan Chaudhry. I  personally viewed the imaging and agree with the interpretation and report as  documented by the fellow.  _____________________________________________________________________________  __     Interpretation  Summary  Severely (EF 10-20%) reduced left ventricular function is present. Severe  diffuse hypokinesis is present.  Mild right ventricular dilation is present. Global right ventricular function  is mildly reduced.  Severe eccentric (anteriorly directed) mitral insufficiency is present. The  cause of the mitral insufficiency is due to flail posterior mitral leaflet.  Regurgitation originates from the flail P1 scallop. The effective regurgitant  orifice area is 0.61 cm^2. Systolic venous reversal noted in R sided veins.  Trace tricuspid insufficiency is present.  The left atrial appendage is normal. It is free of spontaneous echo contrast  and thrombus.     Previous study not available for comparison.  _____________________________________________________________________________  __        Procedure  Transesophageal Echocardiogram with color and spectral Doppler performed. 3D  image acquisition, reconstruction, and real-time interpretation was performed.  Procedure location Echo Lab. The procedure was performed in the Echo Lab.  Informed consent for Transesophegeal echo obtained. MENDEL Probe #61 was used  during the procedure. Patient was sedated using Fentanyl 50 mcg. Patient was  sedated using Versed 2 mg. The heart rate, respiratory rate, oxygen  saturations, blood pressure, and response to care were monitored throughout  the procedure with the assistance of the nurse. I determined this patient to  be an appropriate candidate for the planned sedation and procedure and have  reassessed the patient immediately prior to sedation and procedure. Total  sedation time: 57 minutes of continuous bedside 1:1 monitoring. The Transducer  was inserted without difficulty . The patient tolerated the procedure well.  Complications None. The patient's rhythm is atrial fibrillation. Good quality  two-dimensional was performed and interpreted. Good quality color and spectral  Doppler were performed and interpreted.     Left  Ventricle  Moderate left ventricular dilation is present. Severely (EF 10-20%) reduced  left ventricular function is present. Severe diffuse hypokinesis is present.  Left ventricular wall thickness is normal.     Right Ventricle  The right ventricular wall motion is normal. Right ventricular wall thickness  is normal. Mild right ventricular dilation is present. Global right  ventricular function is mildly reduced.     Atria  The right atria appears normal. The left atrial appendage is normal. It is  free of spontaneous echo contrast and thrombus. Moderate left atrial  enlargement is present. The atrial septum is intact as assessed by color  Doppler .        Mitral Valve  A flail posterior mitral leaflet is present. Severe mitral insufficiency is  present. The cause of the mitral insufficiency appears to be a flail leaflet.  ERO is 0.6cm2.     Aortic Valve  Aortic valve is normal in structure and function. No aortic regurgitation is  present.     Tricuspid Valve  The tricuspid valve is normal. Trace tricuspid insufficiency is present.     Pulmonic Valve  The pulmonic valve is normal.     Vessels  The aorta root is normal. The pulmonary artery is normal. Mild atherothrombi  of the aorta are present in the descending thoracic aorta. The RUPV Doppler  shows systolic reversal . The RLPV Doppler shows systolic reversal . The LUPV  Doppler shows normal velocity waveform . The LLPV Doppler shows normal  velocity waveform .        Pericardium  No pericardial effusion is present.     Compared to Previous Study  Previous study not available for comparison.     _____________________________________________________________________________  __     Doppler Measurements & Calculations  MV max P.4 mmHg  MV mean P.0 mmHg  MV V2 VTI: 81.2 cm        _____________________________________________________________________________  __           Report approved by: Clement MCCRAY 2021 12:18 PM      Cardiac Catheterization     Narrative      Mild non-obstructive coronary artery disease.    Widely patent mid-LAD stent    Reduced cardiac output level.    Right sided filling pressures are normal.    Normal PA pressures.    Left sided filling pressures are mildly elevated.          Discharge Medications   Discharge Medication List as of 1/28/2021 11:21 AM      START taking these medications    Details   acetaminophen (TYLENOL) 325 MG tablet Take 2 tablets (650 mg) by mouth every 4 hours as needed for mild pain, Disp-30 tablet, R-0, E-Prescribe      amiodarone (PACERONE) 200 MG tablet Take 1 tablet (200 mg) by mouth 2 times daily, Disp-90 tablet, R-0, E-Prescribe      aspirin (ASA) 81 MG chewable tablet Take 1 tablet (81 mg) by mouth daily, Disp-30 tablet, R-0, E-Prescribe      atorvastatin (LIPITOR) 40 MG tablet Take 1 tablet (40 mg) by mouth every evening, Disp-30 tablet, R-0, E-Prescribe      captopril (CAPOTEN) 12.5 MG tablet Take 0.5 tablets (6.25 mg) by mouth 3 times daily, Disp-120 tablet, R-0, E-Prescribe      folic acid (FOLVITE) 1 MG tablet Take 1 tablet (1 mg) by mouth daily, Disp-30 tablet, R-0, E-Prescribe      furosemide (LASIX) 20 MG tablet Take 1 tablet (20 mg) by mouth daily, Disp-30 tablet, R-0, E-Prescribe      metoprolol succinate ER (TOPROL-XL) 50 MG 24 hr tablet Take 1 tablet (50 mg) by mouth daily, Disp-30 tablet, R-0, E-Prescribe      polyethylene glycol (MIRALAX) 17 GM/Dose powder Take 17 g by mouth daily, Disp-510 g, R-0, E-Prescribe      rivaroxaban ANTICOAGULANT (XARELTO) 20 MG TABS tablet Take 1 tablet (20 mg) by mouth daily (with dinner), Disp-30 tablet, R-0, E-Prescribe           Allergies   No Known Allergies

## 2021-01-28 ENCOUNTER — PATIENT OUTREACH (OUTPATIENT)
Dept: CARE COORDINATION | Facility: CLINIC | Age: 56
End: 2021-01-28

## 2021-01-28 VITALS
OXYGEN SATURATION: 100 % | RESPIRATION RATE: 16 BRPM | WEIGHT: 166.1 LBS | SYSTOLIC BLOOD PRESSURE: 95 MMHG | TEMPERATURE: 97.7 F | BODY MASS INDEX: 26.07 KG/M2 | HEART RATE: 104 BPM | DIASTOLIC BLOOD PRESSURE: 81 MMHG | HEIGHT: 67 IN

## 2021-01-28 DIAGNOSIS — I50.9 ACUTE DECOMPENSATED HEART FAILURE (H): Primary | ICD-10-CM

## 2021-01-28 DIAGNOSIS — I34.0 NONRHEUMATIC MITRAL VALVE REGURGITATION: Primary | ICD-10-CM

## 2021-01-28 DIAGNOSIS — I34.0 NONRHEUMATIC MITRAL VALVE REGURGITATION: ICD-10-CM

## 2021-01-28 LAB
ALBUMIN SERPL-MCNC: 2.8 G/DL (ref 3.4–5)
ALP SERPL-CCNC: 100 U/L (ref 40–150)
ALT SERPL W P-5'-P-CCNC: 150 U/L (ref 0–70)
ANION GAP SERPL CALCULATED.3IONS-SCNC: 5 MMOL/L (ref 3–14)
AST SERPL W P-5'-P-CCNC: 80 U/L (ref 0–45)
BILIRUB SERPL-MCNC: 1.7 MG/DL (ref 0.2–1.3)
BUN SERPL-MCNC: 20 MG/DL (ref 7–30)
CALCIUM SERPL-MCNC: 8.9 MG/DL (ref 8.5–10.1)
CHLORIDE SERPL-SCNC: 108 MMOL/L (ref 94–109)
CO2 SERPL-SCNC: 23 MMOL/L (ref 20–32)
CREAT SERPL-MCNC: 1.07 MG/DL (ref 0.66–1.25)
GFR SERPL CREATININE-BSD FRML MDRD: 79 ML/MIN/{1.73_M2}
GLUCOSE SERPL-MCNC: 90 MG/DL (ref 70–99)
POTASSIUM SERPL-SCNC: 5 MMOL/L (ref 3.4–5.3)
PROT SERPL-MCNC: 6.1 G/DL (ref 6.8–8.8)
SODIUM SERPL-SCNC: 136 MMOL/L (ref 133–144)
UFH PPP CHRO-ACNC: >1.1 IU/ML

## 2021-01-28 PROCEDURE — 250N000013 HC RX MED GY IP 250 OP 250 PS 637: Performed by: STUDENT IN AN ORGANIZED HEALTH CARE EDUCATION/TRAINING PROGRAM

## 2021-01-28 PROCEDURE — 85520 HEPARIN ASSAY: CPT | Performed by: STUDENT IN AN ORGANIZED HEALTH CARE EDUCATION/TRAINING PROGRAM

## 2021-01-28 PROCEDURE — 80053 COMPREHEN METABOLIC PANEL: CPT | Performed by: STUDENT IN AN ORGANIZED HEALTH CARE EDUCATION/TRAINING PROGRAM

## 2021-01-28 PROCEDURE — 36415 COLL VENOUS BLD VENIPUNCTURE: CPT | Performed by: STUDENT IN AN ORGANIZED HEALTH CARE EDUCATION/TRAINING PROGRAM

## 2021-01-28 RX ORDER — AMIODARONE HYDROCHLORIDE 200 MG/1
200 TABLET ORAL 2 TIMES DAILY
Qty: 90 TABLET | Refills: 0 | Status: SHIPPED | OUTPATIENT
Start: 2021-01-28 | End: 2021-02-18

## 2021-01-28 RX ORDER — CAPTOPRIL 12.5 MG/1
6.25 TABLET ORAL 3 TIMES DAILY
Qty: 120 TABLET | Refills: 0 | Status: SHIPPED | OUTPATIENT
Start: 2021-01-28 | End: 2021-02-18

## 2021-01-28 RX ORDER — METOPROLOL SUCCINATE 50 MG/1
50 TABLET, EXTENDED RELEASE ORAL DAILY
Qty: 30 TABLET | Refills: 0 | Status: SHIPPED | OUTPATIENT
Start: 2021-01-29 | End: 2021-02-18

## 2021-01-28 RX ORDER — POLYETHYLENE GLYCOL 3350 17 G/17G
17 POWDER, FOR SOLUTION ORAL DAILY
Qty: 510 G | Refills: 0 | Status: SHIPPED | OUTPATIENT
Start: 2021-01-28

## 2021-01-28 RX ORDER — FOLIC ACID 1 MG/1
1 TABLET ORAL DAILY
Qty: 30 TABLET | Refills: 0 | Status: SHIPPED | OUTPATIENT
Start: 2021-01-29

## 2021-01-28 RX ORDER — FUROSEMIDE 20 MG
20 TABLET ORAL DAILY
Qty: 30 TABLET | Refills: 0 | Status: SHIPPED | OUTPATIENT
Start: 2021-01-29 | End: 2021-02-18

## 2021-01-28 RX ORDER — ACETAMINOPHEN 325 MG/1
650 TABLET ORAL EVERY 4 HOURS PRN
Qty: 30 TABLET | Refills: 0 | Status: SHIPPED | OUTPATIENT
Start: 2021-01-28

## 2021-01-28 RX ORDER — ATORVASTATIN CALCIUM 40 MG/1
40 TABLET, FILM COATED ORAL EVERY EVENING
Qty: 30 TABLET | Refills: 0 | Status: SHIPPED | OUTPATIENT
Start: 2021-01-28 | End: 2021-02-18

## 2021-01-28 RX ORDER — ASPIRIN 81 MG/1
81 TABLET, CHEWABLE ORAL DAILY
Qty: 30 TABLET | Refills: 0 | Status: SHIPPED | OUTPATIENT
Start: 2021-01-29 | End: 2021-02-18

## 2021-01-28 RX ADMIN — ASPIRIN 81 MG: 81 TABLET, CHEWABLE ORAL at 07:29

## 2021-01-28 RX ADMIN — FUROSEMIDE 20 MG: 20 TABLET ORAL at 07:30

## 2021-01-28 RX ADMIN — MULTIPLE VITAMINS W/ MINERALS TAB 1 TABLET: TAB at 07:29

## 2021-01-28 RX ADMIN — Medication 6.25 MG: at 07:29

## 2021-01-28 RX ADMIN — FOLIC ACID 1 MG: 1 TABLET ORAL at 07:30

## 2021-01-28 RX ADMIN — METOPROLOL SUCCINATE 50 MG: 50 TABLET, EXTENDED RELEASE ORAL at 07:30

## 2021-01-28 RX ADMIN — AMIODARONE HYDROCHLORIDE 200 MG: 200 TABLET ORAL at 07:29

## 2021-01-28 ASSESSMENT — ACTIVITIES OF DAILY LIVING (ADL)
ADLS_ACUITY_SCORE: 14

## 2021-01-28 NOTE — PLAN OF CARE
D: Admitted 1/21 from OSH for management of newly diagnosed CM and surgical consideration of severe MR. Hx of severe MR 2/2 flail P1 posterior mitral leaflet, CAD (s/p PCI 2019), pAF on Eliquis, alcohol use disorder c/b withdrawal seizures, and PE (Nov 2019).     I: Monitored vitals and assessed pt status.   Changed:  Saline locked PIV x2   PRN:    A: A0x4. Able to express needs, flat affect. Afib 80s-110s. Afebrile. Urinating adequately. Right radial site bleed, but better now. CMS intact, quick clot applied. Up independently. HS blood glucose 128. Denies pain. No SOB. Right chest cyst intact. 2g Na diet, 1.5 L fluid restriction.     P: Continue to monitor Pt status and report changes to Cards 2. Plan to discharge home today (1/28).

## 2021-01-28 NOTE — PROVIDER NOTIFICATION
Cards 2 notified of bleeding out of right radial site. Manual pressure applied along with quick clot. CMS intact, +2 pulse.     No new orders.     Eve Dunbar RN

## 2021-01-28 NOTE — CONSULTS
"Mr. Adams is a 55 year old male presenting with chronic systolic heart failure and reducing EF. CORE consult requested.     Consult completed via phone with patient at bedside.  I reviewed the importance of daily weights, 2 gm sodium diet, 2L fluid restriction and compliance with medications upon hospital discharge.  CORE contact phone numbers provided and patient is encouraged to call with any questions or concerns, including any weight gain or loss of 2 or more pounds in 24 hours or 5 or more pounds in 1 week.      Appointment made in CORE clinic:  New CORE in [facility] with Johnny on 2/3/2021 at 12:45, labs at 12:15.  \"Post hospitalization, must be face-to-face\"  New HF with Mauro at the [facility], 2021 at 4pm, labs at 3:30pm. \"New HF, post hospitalization\"    The CORE team will follow-up with the patient at that time, sooner if needed.  Thank you for the consult.    Marina Sanchez RN BSN CHFN  Cardiology Care Coordinator - [facility]  Questions and [phone]  First press #1 for the University and then press #3 for \"Medical Advise\" to reach us Cardiology Nurses.     "

## 2021-01-28 NOTE — DISCHARGE INSTRUCTIONS
Take your medicines every day, as directed      Hi Cordell,    Thanks for speaking me today via phone. Please call us with any questions.  I have your 1st two Heart Failure appts scheduled.    Take care,  Arthur, RN   Monitor Your Weight and Symptoms    Contact us if you:      Gain 2 pounds in one day or 5 pounds in one week    Feel more short of breath    Notice more leg swelling    Feel lightheadeded   Change your lifestyle    Limit Salt or Sodium:    2000 mg  Limit Fluids:    2000 mL or approximately 64 ounces  Eat a Heart Healthy Diet    Low in saturated fats  Stay Active:    Aim to move at least 150 minutes every  week         To Contact us    During Business Hours:  [phone], option # 1 (University)  Then option # 4 (medical questions)     After hours, weekends or holidays:   [phone], Option #4  Ask to speak to the On-Call Cardiologist. Inform them you are a CORE/heart failure patient at the [facility].     Use [employer] allows you to communicate directly with your heart team through secure messaging.    [employer] can be accessed any time on your phone, computer, or tablet.    If you need assistance, we'd be happy to help!         Keep your Heart Appointments:    Follow-up at the [facility] with Johnny Gonzales NP on Wednesday 2/3/2021 at 12:45pm, labs at 12:15pm.    [facility] app[facility] ([address] [place] [address]) on 2/18/2021 at 4pm, labs at 3:30pm

## 2021-01-28 NOTE — PROGRESS NOTES
Date: 1/28/2021    Time of Call: 11:36 AM     Diagnosis:  Severe mitral valve regurgitation     [ TORB ] Ordering provider: Frank Aranda MD  Order:   Echo     Order received by: Sweetie Stone RN     Follow-up/additional notes:   Referral to Structural heart clinic.  Currently, patient is an in-patient.  Plan is to schedule patient in valve clinic with Dr. Aranda in 4-6 weeks, with an echo prior to assess change in LV function and degree of MR.

## 2021-01-29 ENCOUNTER — TELEPHONE (OUTPATIENT)
Dept: CARDIOLOGY | Facility: CLINIC | Age: 56
End: 2021-01-29

## 2021-01-29 NOTE — TELEPHONE ENCOUNTER
Called pt for 48 hour post discharge follow-up. Reached VM, unable to leave message as VMB is not set up. Tried home number and the number was disconnected. Will try again later today. Valentina Rangel RN CORE Care Coordinator

## 2021-01-29 NOTE — TELEPHONE ENCOUNTER
Called to speak with the patient about appointment that I scheduled for 3/4/2021. No way to leave a VM( VM not active).

## 2021-01-29 NOTE — TELEPHONE ENCOUNTER
Second attempt to reach pt, reached VM, unable to leave message as VMB has not been set up yet. Valentina Rangel RN CORE Care Coordinator

## 2021-01-29 NOTE — PROGRESS NOTES
Attempted to contact the patient x2 for post-hospital call without answer. Will close encounter at this time.    Malissa Sanders CMA  Post Hospital Discharge Team

## 2021-02-03 ENCOUNTER — OFFICE VISIT (OUTPATIENT)
Dept: CARDIOLOGY | Facility: CLINIC | Age: 56
End: 2021-02-03
Payer: COMMERCIAL

## 2021-02-03 VITALS
WEIGHT: 171.3 LBS | BODY MASS INDEX: 26.83 KG/M2 | OXYGEN SATURATION: 97 % | DIASTOLIC BLOOD PRESSURE: 69 MMHG | SYSTOLIC BLOOD PRESSURE: 100 MMHG | HEART RATE: 63 BPM

## 2021-02-03 DIAGNOSIS — I25.5 ISCHEMIC CARDIOMYOPATHY: ICD-10-CM

## 2021-02-03 DIAGNOSIS — I48.0 PAROXYSMAL ATRIAL FIBRILLATION (H): ICD-10-CM

## 2021-02-03 DIAGNOSIS — I50.9 ACUTE DECOMPENSATED HEART FAILURE (H): ICD-10-CM

## 2021-02-03 DIAGNOSIS — K70.10 ALCOHOLIC HEPATITIS, UNSPECIFIED WHETHER ASCITES PRESENT (H): ICD-10-CM

## 2021-02-03 DIAGNOSIS — I50.22 CHRONIC SYSTOLIC HEART FAILURE (H): Primary | ICD-10-CM

## 2021-02-03 DIAGNOSIS — Z87.898 HISTORY OF ALCOHOL USE: ICD-10-CM

## 2021-02-03 DIAGNOSIS — I34.0 MITRAL VALVE INSUFFICIENCY, UNSPECIFIED ETIOLOGY: ICD-10-CM

## 2021-02-03 LAB
ANION GAP SERPL CALCULATED.3IONS-SCNC: 4 MMOL/L (ref 3–14)
BUN SERPL-MCNC: 14 MG/DL (ref 7–30)
CALCIUM SERPL-MCNC: 9.1 MG/DL (ref 8.5–10.1)
CHLORIDE SERPL-SCNC: 109 MMOL/L (ref 94–109)
CO2 SERPL-SCNC: 27 MMOL/L (ref 20–32)
CREAT SERPL-MCNC: 1.21 MG/DL (ref 0.66–1.25)
GFR SERPL CREATININE-BSD FRML MDRD: 68 ML/MIN/{1.73_M2}
GLUCOSE SERPL-MCNC: 107 MG/DL (ref 70–99)
NT-PROBNP SERPL-MCNC: 1825 PG/ML (ref 0–125)
POTASSIUM SERPL-SCNC: 4.2 MMOL/L (ref 3.4–5.3)
SODIUM SERPL-SCNC: 140 MMOL/L (ref 133–144)

## 2021-02-03 PROCEDURE — 36415 COLL VENOUS BLD VENIPUNCTURE: CPT | Performed by: NURSE PRACTITIONER

## 2021-02-03 PROCEDURE — 80048 BASIC METABOLIC PNL TOTAL CA: CPT | Performed by: NURSE PRACTITIONER

## 2021-02-03 PROCEDURE — 99214 OFFICE O/P EST MOD 30 MIN: CPT | Performed by: NURSE PRACTITIONER

## 2021-02-03 PROCEDURE — 83880 ASSAY OF NATRIURETIC PEPTIDE: CPT | Performed by: NURSE PRACTITIONER

## 2021-02-03 ASSESSMENT — PAIN SCALES - GENERAL: PAINLEVEL: NO PAIN (0)

## 2021-02-03 NOTE — PATIENT INSTRUCTIONS
Take your medicines every day, as directed    Changes made today:  o No changes  o    Monitor Your Weight and Symptoms    Contact us if you:      Gain 2 pounds in one day or 5 pounds in one week    Feel more short of breath    Notice more leg swelling    Feel lightheadeded   Change your lifestyle    Limit Salt or Sodium:    2000 mg  Limit Fluids:    2000 mL or approximately 64 ounces  Eat a Heart Healthy Diet    Low in saturated fats  Stay Active:    Aim to move at least 150 minutes every  week         To Contact us    During Business Hours:  [phone], option # 1 (University)  Then option # 4 (medical questions)     After hours, weekends or holidays:   [phone], Option #4  Ask to speak to the On-Call Cardiologist. Inform them you are a CORE/heart failure patient at the [facility].     Use [employer] allows you to communicate directly with your heart team through secure messaging.    [employer] can be accessed any time on your phone, computer, or tablet.    If you need assistance, we'd be happy to help!         Keep your Heart Appointments:    CORE - early March

## 2021-02-03 NOTE — NURSING NOTE
Cordell Adams Jr.'s goals for this visit include:   Chief Complaint   Patient presents with     Consult     post hospitalization       He requests these members of his care team be copied on today's visit information: no      PCP: Becca Morton    Referring Provider:  No referring provider defined for this encounter.    /69 (BP Location: Left arm, Patient Position: Sitting, Cuff Size: Adult Regular)   Pulse 63   Wt 77.7 kg (171 lb 4.8 oz)   SpO2 97%   BMI 26.83 kg/m      Do you need any medication refills at today's visit? No    Mercy Altamirano CMA......February 3, 2021     12:39 PM

## 2021-02-03 NOTE — PROGRESS NOTES
HPI: Cordell is a 55 year old White male with a past medical history of severe MR 2/2 flail P1 posterior mitral leaflet, CAD (s/p PCI to pLAD in 2019), pAF on Eliquis pta, alcohol use disorder c/b withdrawal seizures, and PE (Nov 2019) who was transferred from [facility] on 1/21/2021 for management of newly diagnosed cardiomyopathy (LVEF ~15-20%) and surgical consideration of known severe mitral regurgitation    Inpatient from 1/21-1/28- acute decompensated HFrEF in the setting of afib with RVR, alcohol intoxication and volume overload. An echocardiogram was obtained and showed severely reduced LV function (EF 15-20%) w/ severe diffuse hypokinesis, mild RVSD w/ mild dilation, and severe MR w/ a flail posterior mitral leaflet (p2 scallop). He was diuresed aggressively. A right heart cath was obtained on 1/25 and showed relatively normal R sided filling pressures, a marginally elevated wedge of 15, normal PVR of 1, significantly reduced CO/CI of 3.5/1.88 and high SVR of 1459. Coronary angiogram was also performed and showed no hemodynamically significant stenosis. It is felt that his acute biventricular heart failure is secondary to alcohol and atrial fibrillation. Euvolemia was achieved and has been maintained with 20mg PO lasix daily. Discharge weight is 166#. Additionally, his Toprol XL was increased to 50mg daily and he was started on Amiodarone. He remains in atrial fibrillation at this time however rates have come down to low 100s over the last several days. Finally, he was started on a small dose of captopril at 6.25mg TID in place of PTA lisinopril given his blood pressures are soft in the 90s systolic and this is a shorter acting medication    He is still getting use to his medications as he was just discharged a few days ago he says.   Patient states he has no SOB /ARGUELLO . Patient has no swelling in the legs/ abdomen. Patient has a scale at home and weighs himself he has been 167-168 lbs.   Patient prepares his own meals. His fiance watches what he eats. He has been staying within the 64 oz of fluid he feels.  He has been sober since hospitalization.     Denies SOB, ARGUELLO, PND, orthopnea, edema, weight gain, chest pain, palpitations, lightheadedness, dizziness, near syncopal/syncopal episodes. Cordell has been following salt and fluid restrictions.          PAST MEDICAL HISTORY:  No past medical history on file.    FAMILY HISTORY:  No family history on file.    SOCIAL HISTORY:  Social History     Tobacco Use     Smoking status: Never Smoker     Smokeless tobacco: Never Used   Substance Use Topics     Alcohol use: None     Drug use: None           CURRENT MEDICATIONS:  Current Outpatient Medications   Medication Sig Dispense Refill     acetaminophen (TYLENOL) 325 MG tablet Take 2 tablets (650 mg) by mouth every 4 hours as needed for mild pain 30 tablet 0     amiodarone (PACERONE) 200 MG tablet Take 1 tablet (200 mg) by mouth 2 times daily 90 tablet 0     aspirin (ASA) 81 MG chewable tablet Take 1 tablet (81 mg) by mouth daily 30 tablet 0     atorvastatin (LIPITOR) 40 MG tablet Take 1 tablet (40 mg) by mouth every evening 30 tablet 0     captopril (CAPOTEN) 12.5 MG tablet Take 0.5 tablets (6.25 mg) by mouth 3 times daily 120 tablet 0     folic acid (FOLVITE) 1 MG tablet Take 1 tablet (1 mg) by mouth daily 30 tablet 0     furosemide (LASIX) 20 MG tablet Take 1 tablet (20 mg) by mouth daily 30 tablet 0     metoprolol succinate ER (TOPROL-XL) 50 MG 24 hr tablet Take 1 tablet (50 mg) by mouth daily 30 tablet 0     polyethylene glycol (MIRALAX) 17 GM/Dose powder Take 17 g by mouth daily 510 g 0     rivaroxaban ANTICOAGULANT (XARELTO) 20 MG TABS tablet Take 1 tablet (20 mg) by mouth daily (with dinner) 30 tablet 0       ROS:  Review Of Systems  Skin: negative  Eyes: negative  Ears/Nose/Throat: negative  Respiratory: No shortness of breath, dyspnea on exertion, cough, or hemoptysis  Cardiovascular:  negative  Gastrointestinal: negative  Genitourinary: negative  Musculoskeletal: negative  Neurologic: negative  Psychiatric: negative  Hematologic/Lymphatic/Immunologic: negative  Endocrine: negative      EXAM:  /69 (BP Location: Left arm, Patient Position: Sitting, Cuff Size: Adult Regular)   Pulse 63   Wt 77.7 kg (171 lb 4.8 oz)   SpO2 97%   BMI 26.83 kg/m    Home weight:  General: alert, articulate, and in no acute distress.  HEENT: normocephalic, atraumatic, anicteric sclera, EOMI, mucosa moist, no cyanosis.   Neck: neck supple.  No adenopathy, masses, or carotid bruits.  JVP not detected at 45 degrees  Heart: regular rhythm, normal S1/S2, no murmur, gallop, rub.  Precordium quiet with normal PMI.     Lungs: clear, no rales, ronchi, or wheezing.  No accessory muscle use, respirations unlabored.   Abdomen: soft, non-tender, bowel sounds present, no hepatomegaly  Extremities:no edema.   No cyanosis.    Neurological: alert and oriented x 3.  normal speech and affect, no gross motor deficits  Skin:  No ecchymoses, rashes, or clubbing.    Labs:  CBC RESULTS:  Lab Results   Component Value Date    WBC 3.8 (L) 01/27/2021    RBC 4.41 01/27/2021    HGB 15.0 01/27/2021    HCT 45.3 01/27/2021     (H) 01/27/2021    MCH 34.0 (H) 01/27/2021    MCHC 33.1 01/27/2021    RDW 15.7 (H) 01/27/2021     01/27/2021       CMP RESULTS:  Lab Results   Component Value Date     02/03/2021    POTASSIUM 4.2 02/03/2021    CHLORIDE 109 02/03/2021    CO2 27 02/03/2021    ANIONGAP 4 02/03/2021     (H) 02/03/2021    BUN 14 02/03/2021    CR 1.21 02/03/2021    GFRESTIMATED 68 02/03/2021    GFRESTBLACK 79 02/03/2021    BRAD 9.1 02/03/2021    BILITOTAL 1.7 (H) 01/28/2021    ALBUMIN 2.8 (L) 01/28/2021    ALKPHOS 100 01/28/2021     (H) 01/28/2021    AST 80 (H) 01/28/2021        INR RESULTS:  Lab Results   Component Value Date    INR 1.02 01/25/2021       No components found for: CK  Lab Results   Component  Value Date    MAG 2.1 01/26/2021     Lab Results   Component Value Date    NTBNP 1,825 (H) 02/03/2021     @BRIEFLABR (dig)@    Most recent echocardiogram:  No results found for this or any previous visit (from the past 8760 hour(s)).      Assessment and Plan:    In summary,Cordell  is a 55 year old male with history of severe MR 2/2 flail P1 posterior mitral leaflet, CAD (s/p PCI to pLAD in 2019), pAF on Eliquis pta, alcohol use disorder c/b withdrawal seizures, and PE (Nov 2019) who was transferred from [facility] on 1/21/2021 for management of newly diagnosed cardiomyopathy (LVEF ~15-20%) and surgical consideration of known severe mitral regurgitation.     Today is his initial CORE visit. He appears to be euvolemic today and doing well. He was just discharged a week a ago and appears to still be adjusting to his medications. His blood pressures are still soft and I will not change the captopril at this time. He has an appointment with Dr. Wade in a couple weeks and we will set up a CORE appt a couple weeks after that.   1.  Chronic sysotlic heart failure secondary to ICM.  Stage C  NYHA Class III  ACEi/ARB: yes- Captopril 6.25 mg TID - plan- change to lisinopril  BB: yes-Toprol 50 mg   Aldosterone antagonist: deferred while other medical therapy is prioritized- future add -on  SCD prophylaxis: decision deferred during medication uptitration  Fluid status: euvolemic- furosemide 20 mg daily  Anticoagulation: xaralto  Antiplatelet:  ASA dose   Sleep apnea:not discussed  NSAID use:  Contraindicated.  Avoid use.  Remote Monitoring:none    2.  Other comordbid conditions:     # Severe mitral regurgitation  2/2 flail posterior mitral leaflet.  During admission at the [facility], he was seen by interventional cardiology and structural cardiology. There is concern at this time that he would tolerate complete surgical open repair given markedly reduced LVEF- may have difficulty coming off bypass. Other option would  be a mitral clip. However, it is recommended first to repeat an echocardiogram in 1 month after ongoing complete alcohol cessation and while better optimzing afib in hopes that his LVEF will recover some and make him a lower risk surgical candidate. Decision for repair can be made following.     # Paroxysmal Afib with RVR, currently relatively rate controlled (-110/min)  Rates at OSH 110s-180s recently, likely 2/2 alcohol withdrawal, decompensated heart failure and aspiration pneumonia.  Prior to hospitalization was prescribed Amiodarone 200mg, Metoprolol succinate 50mg [but patient was not taking]. Cardioverted once in 2018 in setting of etoh withdrawal.    #Alcoholic hepatitis-alcohol cessation counseling in the hospital. PCP to manage    # Alcohol use disorder  EtOH 168 mg/dL at OSH. Last drink was 1/16. Requiring PO diazepam at OSH for withdrawal. CD consult at OSH offered counseling and resource information. - still refraining from Alcohol - PCP to follow    3.  Follow-up   Dr. Wade in 2 weeks - with labs   CORE March 1st week - labs prior        Johnny MURPHY CNP  [facility] Clinic

## 2021-02-08 DIAGNOSIS — I50.22 CHRONIC SYSTOLIC HEART FAILURE (H): Primary | ICD-10-CM

## 2021-02-18 ENCOUNTER — OFFICE VISIT (OUTPATIENT)
Dept: CARDIOLOGY | Facility: CLINIC | Age: 56
End: 2021-02-18
Attending: INTERNAL MEDICINE
Payer: COMMERCIAL

## 2021-02-18 VITALS
WEIGHT: 169 LBS | SYSTOLIC BLOOD PRESSURE: 126 MMHG | HEIGHT: 67 IN | BODY MASS INDEX: 26.53 KG/M2 | HEART RATE: 62 BPM | OXYGEN SATURATION: 99 % | DIASTOLIC BLOOD PRESSURE: 83 MMHG

## 2021-02-18 DIAGNOSIS — I50.9 ACUTE DECOMPENSATED HEART FAILURE (H): ICD-10-CM

## 2021-02-18 DIAGNOSIS — I50.22 CHRONIC SYSTOLIC HEART FAILURE (H): Primary | ICD-10-CM

## 2021-02-18 DIAGNOSIS — I50.22 CHRONIC SYSTOLIC HEART FAILURE (H): ICD-10-CM

## 2021-02-18 PROBLEM — I26.99 ACUTE PULMONARY EMBOLISM (H): Status: ACTIVE | Noted: 2019-11-04

## 2021-02-18 PROBLEM — F10.930 ALCOHOL WITHDRAWAL SEIZURE WITHOUT COMPLICATION (H): Status: ACTIVE | Noted: 2019-03-26

## 2021-02-18 PROBLEM — F41.9 ANXIETY: Status: ACTIVE | Noted: 2018-03-29

## 2021-02-18 PROBLEM — I25.10 ASCVD (ARTERIOSCLEROTIC CARDIOVASCULAR DISEASE): Status: ACTIVE | Noted: 2019-04-23

## 2021-02-18 PROBLEM — I48.91 ATRIAL FIBRILLATION WITH RVR (H): Status: ACTIVE | Noted: 2018-03-29

## 2021-02-18 PROBLEM — R56.9 ALCOHOL WITHDRAWAL SEIZURE WITHOUT COMPLICATION (H): Status: ACTIVE | Noted: 2019-03-26

## 2021-02-18 PROBLEM — I25.10 CORONARY ARTERY DISEASE INVOLVING NATIVE CORONARY ARTERY OF NATIVE HEART WITHOUT ANGINA PECTORIS: Status: ACTIVE | Noted: 2019-04-23

## 2021-02-18 LAB
ANION GAP SERPL CALCULATED.3IONS-SCNC: 10 MMOL/L (ref 3–14)
BUN SERPL-MCNC: 12 MG/DL (ref 7–30)
CALCIUM SERPL-MCNC: 9.2 MG/DL (ref 8.5–10.1)
CHLORIDE SERPL-SCNC: 108 MMOL/L (ref 94–109)
CO2 SERPL-SCNC: 24 MMOL/L (ref 20–32)
CREAT SERPL-MCNC: 1.24 MG/DL (ref 0.66–1.25)
GFR SERPL CREATININE-BSD FRML MDRD: 66 ML/MIN/{1.73_M2}
GLUCOSE SERPL-MCNC: 99 MG/DL (ref 70–99)
NT-PROBNP SERPL-MCNC: 838 PG/ML (ref 0–125)
POTASSIUM SERPL-SCNC: 3.8 MMOL/L (ref 3.4–5.3)
SODIUM SERPL-SCNC: 141 MMOL/L (ref 133–144)

## 2021-02-18 PROCEDURE — G0463 HOSPITAL OUTPT CLINIC VISIT: HCPCS

## 2021-02-18 PROCEDURE — 80048 BASIC METABOLIC PNL TOTAL CA: CPT | Performed by: PATHOLOGY

## 2021-02-18 PROCEDURE — 83880 ASSAY OF NATRIURETIC PEPTIDE: CPT | Performed by: PATHOLOGY

## 2021-02-18 PROCEDURE — 36415 COLL VENOUS BLD VENIPUNCTURE: CPT | Performed by: PATHOLOGY

## 2021-02-18 PROCEDURE — 99214 OFFICE O/P EST MOD 30 MIN: CPT | Mod: GC | Performed by: INTERNAL MEDICINE

## 2021-02-18 RX ORDER — LISINOPRIL 5 MG/1
5 TABLET ORAL DAILY
Qty: 90 TABLET | Refills: 3 | Status: SHIPPED | OUTPATIENT
Start: 2021-02-18 | End: 2021-04-14

## 2021-02-18 RX ORDER — AMIODARONE HYDROCHLORIDE 200 MG/1
200 TABLET ORAL 2 TIMES DAILY
Qty: 180 TABLET | Refills: 3 | Status: SHIPPED | OUTPATIENT
Start: 2021-02-18 | End: 2021-04-07

## 2021-02-18 RX ORDER — ATORVASTATIN CALCIUM 40 MG/1
40 TABLET, FILM COATED ORAL EVERY EVENING
Qty: 90 TABLET | Refills: 3 | Status: SHIPPED | OUTPATIENT
Start: 2021-02-18 | End: 2021-04-07

## 2021-02-18 RX ORDER — METOPROLOL SUCCINATE 50 MG/1
50 TABLET, EXTENDED RELEASE ORAL DAILY
Qty: 90 TABLET | Refills: 3 | Status: SHIPPED | OUTPATIENT
Start: 2021-02-18 | End: 2021-03-17

## 2021-02-18 RX ORDER — ASPIRIN 81 MG/1
81 TABLET, CHEWABLE ORAL DAILY
Qty: 90 TABLET | Refills: 3 | Status: SHIPPED | OUTPATIENT
Start: 2021-02-18

## 2021-02-18 RX ORDER — FUROSEMIDE 20 MG
20 TABLET ORAL DAILY
Qty: 90 TABLET | Refills: 3 | Status: SHIPPED | OUTPATIENT
Start: 2021-02-18 | End: 2021-08-09

## 2021-02-18 ASSESSMENT — PAIN SCALES - GENERAL: PAINLEVEL: NO PAIN (0)

## 2021-02-18 ASSESSMENT — MIFFLIN-ST. JEOR: SCORE: 1570.21

## 2021-02-18 NOTE — NURSING NOTE
Chief Complaint   Patient presents with     New Patient     New HF,  HFrEF, ICM, labs  prior     Vitals were taken and medication reconciled.    ALBA Brian  4:13 PM

## 2021-02-18 NOTE — PATIENT INSTRUCTIONS
"Cardiology Providers you saw during your visit:  Dr. Wade    Medication changes:  1.  Stop Capotril, Start Lisinopril 5 mg daily.  You can start by taking 2.5 mg daily for the first few days and then increase to 5 mg daily    Follow up:  1.  Mitral Clip appointment on March 4th  2.  Follow up with Johnny as scheduled on March 3rd - will need to add labs on to this appointment  3.  Follow up with Dr Wade in 4 months with an echo and lab  Labs:  Results for CHAS JARAMILLO  (MRN [id number]) as of 2/18/2021 17:09   Ref. Range 2/18/2021 15:40   Sodium Latest Ref Range: 133 - 144 mmol/L 141   Potassium Latest Ref Range: 3.4 - 5.3 mmol/L 3.8   Chloride Latest Ref Range: 94 - 109 mmol/L 108   Carbon Dioxide Latest Ref Range: 20 - 32 mmol/L 24   Urea Nitrogen Latest Ref Range: 7 - 30 mg/dL 12   Creatinine Latest Ref Range: 0.66 - 1.25 mg/dL 1.24   GFR Estimate Latest Ref Range: >60 mL/min/1.73_m2 66   GFR Estimate If Black Latest Ref Range: >60 mL/min/1.73_m2 76   Calcium Latest Ref Range: 8.5 - 10.1 mg/dL 9.2   Anion Gap Latest Ref Range: 3 - 14 mmol/L 10   N-Terminal Pro Bnp Latest Ref Range: 0 - 125 pg/mL 838 (H)   Glucose Latest Ref Range: 70 - 99 mg/dL 99       Please call if you have :  1. Weight gain of more than 2 pounds in a day or 5 pounds in a week  2. Increased shortness of breath, swelling or bloating  3. Dizziness, lightheadedness   4. Any questions or concerns.       Follow the American Heart Association Diet and Lifestyle recommendations:  Limit saturated fat, trans fat, sodium, red meat, sweets and sugar-sweetened beverages. If you choose to eat red meat, compare labels and select the leanest cuts available.  Aim for at least 150 minutes of moderate physical activity or 75 minutes of vigorous physical activity - or an equal combination of both - each week.      During business hours: [phone], press option # 1 to be routed to the [facility] then option # 4 for \"To send a message to your " "care team\"      After hours, weekends or holidays: On Call Cardiologist- [phone]   option #4 and ask to speak to the on-call Cardiologist. Inform them you are a CORE/heart failure patient at the [facility].      Heart Failure Support Group  [facility]  The Board Room, 8th Floor  [address] [address]     Meetings   1-2 pm  , 1-2 p.m.  , 1-2 p.m.  , 1-2 p.m.  , 1-2 p.m.  May 3rd, 1-2 p.m.  , 1-2 p.m.  , 1-2 p.m.  , 1-2 p.m.  , 1-2 p.m.  , 1-2 p.m.  , 1-2 p.m.  , 1-2 p.m.      Susana Workman RN BSN CHFN  Cardiology Care Coordinator - Heart Failure/ [facility]  [facility]   Questions and [phone]   First press #1 for the [facility] and then press #4 for \"To send a message to your care team\"    "

## 2021-02-18 NOTE — PROGRESS NOTES
Heart Failure Cardiology Clinic Note  February 18, 2021    HPI    Dear Colleagues,     We had the pleasure of seeing Mr. Cordell Adams in the heart failure clinic today.  He is being seen as a post hospitalization follow up and new clinic patient.    As you know, Cordell has a history of non ischemic cardiomyopathy secondary to alcohol abuse.  This has been longstanding but with abstinence his ejection fraction has been shown to normalize to 55-60% from 10-15%.  Unfortunately, Cordell has not been able to remain alcohol free.  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 His other pertinent history includes atrial fibrillation with episodes of RVR, coronary artery disease requiring PCI of his mid LAD, and alcoholic steatosis with prior hepatitis.  There also may have been a pulmonary embolism at some point.  His PCI was performed at [facility] when there was debate to PCI vs CAB and MVR.    Patient was hospitalized at [facility] for one week in January, after being admitted to [facility] with worsening heart failure, volume overload, alcohol intoxication, and rapid rate atrial fibrillation.  He was diuresed to a discharge weight of 166 lb, with rate control being achieved with metoprolol and amiodarone.  Electrical cardioversion was not attempted as it was not felt he would stay sinus, and had prior cardioversions at [facility] that had failed.   He was seen by both cardiothoracic surgery and the interventional structural cardiology team regarding severe MR, and it was felt that if he was able to tolerate some amount of goal directed medical therapy with abstinence from alcohol, he would be lower risk for either procedure.      He currently feels well.  He tries to walk, which is the most strenuous activity he will do.  He will occasionally do stairs which he does not have a problem with.   Denies fevers, chills, sweats, lightheadedness, dizziness, chest pain, chest pressure, shortness of breath, PND, palpitations, or lower extremity swelling.  He has had no bleeding issues.  He has been abstaining from alcohol since his discharge, just doing it on his own without assistance from a formal chemical dependency program or from .    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  Never smoker  Alcohol abuse, 3/4 liter of vodka daily, abstinent now  Lives in [place]    CURRENT MEDICATIONS:  Current Outpatient Medications   Medication Sig Dispense Refill     acetaminophen (TYLENOL) 325 MG tablet Take 2 tablets (650 mg) by mouth every 4 hours as needed for mild pain 30 tablet 0     amiodarone (PACERONE) 200 MG tablet Take 1 tablet (200 mg) by mouth 2 times daily 90 tablet 0     aspirin (ASA) 81 MG chewable tablet Take 1 tablet (81 mg) by mouth daily 30 tablet 0     atorvastatin (LIPITOR) 40 MG tablet Take 1 tablet (40 mg) by mouth every evening 30 tablet 0     captopril (CAPOTEN) 12.5 MG tablet Take 0.5 tablets (6.25 mg) by mouth 3 times daily 120 tablet 0     folic acid (FOLVITE) 1 MG tablet Take 1 tablet (1 mg) by mouth daily 30 tablet 0     furosemide (LASIX) 20 MG tablet Take 1 tablet (20 mg) by mouth daily 30 tablet 0     metoprolol succinate ER (TOPROL-XL) 50 MG 24 hr tablet Take 1 tablet (50 mg) by mouth daily 30 tablet 0     polyethylene glycol (MIRALAX) 17  "GM/Dose powder Take 17 g by mouth daily 510 g 0     rivaroxaban ANTICOAGULANT (XARELTO) 20 MG TABS tablet Take 1 tablet (20 mg) by mouth daily (with dinner) 30 tablet 0       ROS:   10 point ROS negative except HPI    EXAM:  /83 (BP Location: Right arm, Patient Position: Chair, Cuff Size: Adult Regular)   Pulse 62   Ht 1.702 m (5' 7\")   Wt 76.7 kg (169 lb)   SpO2 99%   BMI 26.47 kg/m      General: appears comfortable, alert and articulate  Head: normocephalic, atraumatic  Eyes: anicteric sclera, EOMI  Neck: no adenopathy  Orophyarynx: moist mucosa, no lesions, dentition intact  Heart: regular, S1/S2, 3/6 holosystolic murmur, estimated JVP 8 cm  Lungs: clear, no rales or wheezing  Abdomen: soft, non-tender, bowel sounds present, no hepatosplenomegaly  Extremities: no clubbing, cyanosis or edema  Neurological: normal speech and affect, no gross motor deficits    Labs:  CBC RESULTS:  Lab Results   Component Value Date    WBC 3.8 (L) 01/27/2021    RBC 4.41 01/27/2021    HGB 15.0 01/27/2021    HCT 45.3 01/27/2021     (H) 01/27/2021    MCH 34.0 (H) 01/27/2021    MCHC 33.1 01/27/2021    RDW 15.7 (H) 01/27/2021     01/27/2021     CMP RESULTS:  Lab Results   Component Value Date     02/03/2021    POTASSIUM 4.2 02/03/2021    CHLORIDE 109 02/03/2021    CO2 27 02/03/2021    ANIONGAP 4 02/03/2021     (H) 02/03/2021    BUN 14 02/03/2021    CR 1.21 02/03/2021    GFRESTIMATED 68 02/03/2021    GFRESTBLACK 79 02/03/2021    BRAD 9.1 02/03/2021    BILITOTAL 1.7 (H) 01/28/2021    ALBUMIN 2.8 (L) 01/28/2021    ALKPHOS 100 01/28/2021     (H) 01/28/2021    AST 80 (H) 01/28/2021      MENDEL Echocardiogram 1/22/21:  Interpretation Summary  Severely (EF 10-20%) reduced left ventricular function is present. Severe  diffuse hypokinesis is present.  Mild right ventricular dilation is present. Global right ventricular function  is mildly reduced.  Severe eccentric (anteriorly directed) mitral insufficiency " is present. The  cause of the mitral insufficiency is due to flail posterior mitral leaflet.  Regurgitation originates from the flail P1 scallop. The effective regurgitant  orifice area is 0.61 cm^2. Systolic venous reversal noted in R sided veins.  Trace tricuspid insufficiency is present.  The left atrial appendage is normal. It is free of spontaneous echo contrast  and thrombus.     Previous study not available for comparison.    RHC  HR 70  BP 98/58/71  O2 saturation 100 %    RA mean 7  RV 32/8  PA 34/23/18  PCWP mean 15  PA saturation 63.4 %  Lazarus CO/CI 3.51/1.88  PVR 1 Wood Units  SVR 1,459 Dynes sec cm-5    Coronary Findings  Dominance: Right  Left Main   The vessel was visualized by selective angiography, is moderate in size and is angiographically normal. There was 0% vessel disease.   Left Anterior Descending   Previously placed Mid LAD drug eluting stent is widely patent. Previous treatment took place 1-2 years ago. No restenosis in the previous stent.   First Diagonal Branch   The vessel is small.   Second Diagonal Branch   The vessel is large and is angiographically normal.   Third Diagonal Branch   The vessel is small.   Ramus Intermedius   The vessel was visualized by selective angiography and is moderate in size. There was 0% vessel disease.   Left Circumflex   The vessel was visualized by selective angiography and is moderate in size. There was 0% vessel disease.   First Obtuse Marginal Branch   The vessel is small.   Second Obtuse Marginal Branch   The vessel is large.   Right Coronary Artery   The vessel was visualized by selective angiography and is moderate in size. There was 0% vessel disease.         Assessment and Plan:     In summary, this is a pleasant 56 yo M with non ischemic cardiomyopathy from alcohol abuse, with also chronic severe mitral regurgitation from flail posterior leaflet, as well as atrial fibrillation.    Patient currently appears compensated.  He is euvolemic and tolerating  some amount of neurohormonal blockage.  Furthermore, he is in sinus rhythm.  We will continue his metoprolol, switch his captopril to lisinopril 2.5 mg BID, with plans to uptitrate.  Continue lasix 20mg daily.    He can continue on low dose amiodarone for rhythm control for now, which potentially can come off in the future if he remains abstinent from alcohol.  He is on xarelto for thromboembolic prophylaxis.      Coronary disease he is on aspirin and statin.      He has follow up with our [facility] and the structural valve clinic with an echocardiogram in a few weeks.  We will see him back in few weeks after those visits.    Patient seen and discussed with attending Dr. Wade.    Roya Butcher MD PGY7  Advanced Heart Failure Cardiology Fellow

## 2021-02-18 NOTE — LETTER
2/18/2021      RE: Cordell Adams Jr.  [address]  [place] [address]       Dear Colleague,    Thank you for the opportunity to participate in the care of your patient, Cordell Adams Jr., at the [facility] [place] at [facility]. Please see a copy of my visit note below.    Heart Failure Cardiology Clinic Note  February 18, 2021    HPI    Dear Colleagues,     We had the pleasure of seeing Mr. Cordell Adams in the heart failure clinic today.  He is being seen as a post hospitalization follow up and new clinic patient.    As you know, Cordell has a history of non ischemic cardiomyopathy secondary to alcohol abuse.  This has been longstanding but with abstinence his ejection fraction has been shown to normalize to 55-60% from 10-15%.  Unfortunately, Cordell has not been able to remain alcohol free.  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 His other pertinent history includes atrial fibrillation with episodes of RVR, coronary artery disease requiring PCI of his mid LAD, and alcoholic steatosis with prior hepatitis.  There also may have been a pulmonary embolism at some point.  His PCI was performed at [facility] when there was debate to PCI vs CAB and MVR.    Patient was hospitalized at [facility] for one week in January, after being admitted to [facility] with worsening heart failure, volume overload, alcohol intoxication, and rapid rate atrial fibrillation.  He was diuresed to a discharge weight of 166 lb, with rate control being achieved with metoprolol and amiodarone.  Electrical cardioversion was not attempted as it was not felt he would stay sinus, and had prior cardioversions at [facility] that had failed.   He was seen by both cardiothoracic surgery and the interventional structural  cardiology team regarding severe MR, and it was felt that if he was able to tolerate some amount of goal directed medical therapy with abstinence from alcohol, he would be lower risk for either procedure.      He currently feels well.  He tries to walk, which is the most strenuous activity he will do.  He will occasionally do stairs which he does not have a problem with.  Denies fevers, chills, sweats, lightheadedness, dizziness, chest pain, chest pressure, shortness of breath, PND, palpitations, or lower extremity swelling.  He has had no bleeding issues.  He has been abstaining from alcohol since his discharge, just doing it on his own without assistance from a formal chemical dependency program or from AA.    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  Never smoker  Alcohol abuse, 3/4 liter of vodka daily, abstinent now  Lives in [place]    CURRENT MEDICATIONS:  Current Outpatient Medications   Medication Sig Dispense Refill     acetaminophen (TYLENOL) 325 MG tablet Take 2 tablets (650 mg) by mouth every 4 hours as needed for mild pain 30 tablet 0     amiodarone (PACERONE) 200 MG tablet Take 1 tablet (200 mg) by mouth 2 times daily 90 tablet 0     aspirin (ASA) 81 MG chewable tablet Take 1 tablet (81 mg) by mouth daily 30 tablet 0     atorvastatin (LIPITOR) 40 MG tablet Take 1 tablet (40 mg) by mouth every evening 30 tablet 0     captopril (CAPOTEN) 12.5 MG tablet Take  "0.5 tablets (6.25 mg) by mouth 3 times daily 120 tablet 0     folic acid (FOLVITE) 1 MG tablet Take 1 tablet (1 mg) by mouth daily 30 tablet 0     furosemide (LASIX) 20 MG tablet Take 1 tablet (20 mg) by mouth daily 30 tablet 0     metoprolol succinate ER (TOPROL-XL) 50 MG 24 hr tablet Take 1 tablet (50 mg) by mouth daily 30 tablet 0     polyethylene glycol (MIRALAX) 17 GM/Dose powder Take 17 g by mouth daily 510 g 0     rivaroxaban ANTICOAGULANT (XARELTO) 20 MG TABS tablet Take 1 tablet (20 mg) by mouth daily (with dinner) 30 tablet 0       ROS:   10 point ROS negative except HPI    EXAM:  /83 (BP Location: Right arm, Patient Position: Chair, Cuff Size: Adult Regular)   Pulse 62   Ht 1.702 m (5' 7\")   Wt 76.7 kg (169 lb)   SpO2 99%   BMI 26.47 kg/m      General: appears comfortable, alert and articulate  Head: normocephalic, atraumatic  Eyes: anicteric sclera, EOMI  Neck: no adenopathy  Orophyarynx: moist mucosa, no lesions, dentition intact  Heart: regular, S1/S2, 3/6 holosystolic murmur, estimated JVP 8 cm  Lungs: clear, no rales or wheezing  Abdomen: soft, non-tender, bowel sounds present, no hepatosplenomegaly  Extremities: no clubbing, cyanosis or edema  Neurological: normal speech and affect, no gross motor deficits    Labs:  CBC RESULTS:  Lab Results   Component Value Date    WBC 3.8 (L) 01/27/2021    RBC 4.41 01/27/2021    HGB 15.0 01/27/2021    HCT 45.3 01/27/2021     (H) 01/27/2021    MCH 34.0 (H) 01/27/2021    MCHC 33.1 01/27/2021    RDW 15.7 (H) 01/27/2021     01/27/2021     CMP RESULTS:  Lab Results   Component Value Date     02/03/2021    POTASSIUM 4.2 02/03/2021    CHLORIDE 109 02/03/2021    CO2 27 02/03/2021    ANIONGAP 4 02/03/2021     (H) 02/03/2021    BUN 14 02/03/2021    CR 1.21 02/03/2021    GFRESTIMATED 68 02/03/2021    GFRESTBLACK 79 02/03/2021    BRAD 9.1 02/03/2021    BILITOTAL 1.7 (H) 01/28/2021    ALBUMIN 2.8 (L) 01/28/2021    ALKPHOS 100 01/28/2021    "  (H) 01/28/2021    AST 80 (H) 01/28/2021      MENDEL Echocardiogram 1/22/21:  Interpretation Summary  Severely (EF 10-20%) reduced left ventricular function is present. Severe  diffuse hypokinesis is present.  Mild right ventricular dilation is present. Global right ventricular function  is mildly reduced.  Severe eccentric (anteriorly directed) mitral insufficiency is present. The  cause of the mitral insufficiency is due to flail posterior mitral leaflet.  Regurgitation originates from the flail P1 scallop. The effective regurgitant  orifice area is 0.61 cm^2. Systolic venous reversal noted in R sided veins.  Trace tricuspid insufficiency is present.  The left atrial appendage is normal. It is free of spontaneous echo contrast  and thrombus.     Previous study not available for comparison.    RHC  HR 70  BP 98/58/71  O2 saturation 100 %    RA mean 7  RV 32/8  PA 34/23/18  PCWP mean 15  PA saturation 63.4 %  Lazarus CO/CI 3.51/1.88  PVR 1 Wood Units  SVR 1,459 Dynes sec cm-5    Coronary Findings  Dominance: Right  Left Main   The vessel was visualized by selective angiography, is moderate in size and is angiographically normal. There was 0% vessel disease.   Left Anterior Descending   Previously placed Mid LAD drug eluting stent is widely patent. Previous treatment took place 1-2 years ago. No restenosis in the previous stent.   First Diagonal Branch   The vessel is small.   Second Diagonal Branch   The vessel is large and is angiographically normal.   Third Diagonal Branch   The vessel is small.   Ramus Intermedius   The vessel was visualized by selective angiography and is moderate in size. There was 0% vessel disease.   Left Circumflex   The vessel was visualized by selective angiography and is moderate in size. There was 0% vessel disease.   First Obtuse Marginal Branch   The vessel is small.   Second Obtuse Marginal Branch   The vessel is large.   Right Coronary Artery   The vessel was visualized by selective  angiography and is moderate in size. There was 0% vessel disease.         Assessment and Plan:     In summary, this is a pleasant 56 yo M with non ischemic cardiomyopathy from alcohol abuse, with also chronic severe mitral regurgitation from flail posterior leaflet, as well as atrial fibrillation.    Patient currently appears compensated.  He is euvolemic and tolerating some amount of neurohormonal blockage.  Furthermore, he is in sinus rhythm.  We will continue his metoprolol, switch his captopril to lisinopril 2.5 mg BID, with plans to uptitrate.  Continue lasix 20mg daily.    He can continue on low dose amiodarone for rhythm control for now, which potentially can come off in the future if he remains abstinent from alcohol.  He is on xarelto for thromboembolic prophylaxis.      Coronary disease he is on aspirin and statin.      He has follow up with our [facility] Gonzales and the structural valve clinic with an echocardiogram in a few weeks.  We will see him back in few weeks after those visits.    Patient seen and discussed with attending Dr. Wade.    Roya Butcher MD PGY7  Advanced Heart Failure Cardiology Fellow      Attestation signed by Leny Wade MD at 2/26/2021  8:37 AM:  I have seen and examined the patient with the house staff on February 18, 2021  and agree with the outlined assessment and plan.      Leny Wade MD  Associate Professor of Medicine   [facility] Division of Cardiology

## 2021-02-18 NOTE — NURSING NOTE
Diet: Patient instructed regarding a heart failure healthy diet, including discussion of reduced fat and 2000 mg daily sodium restriction, daily weights, medication purpose and compliance, fluid restrictions and resources for patient and family to access for assistance with heart failure management.       Labs: Patient was given results of the laboratory testing obtained today and patient was instructed about when to return for the next laboratory testing.     Med Reconcile: Reviewed and verified all current medications with the patient. The updated medication list was printed and given to the patient.     Med changes: stop Capotril, start lisinopril 5 mg daily    Return Appointment: Patient given instructions regarding scheduling next clinic visit: [facility] as scheduled in March, TAVR 3/4, Mu[facility] 4 months with labs and echos    Patient stated he understood all health information given and agreed to call with further questions or concerns.     Susana Workman RN

## 2021-02-24 DIAGNOSIS — I50.22 CHRONIC SYSTOLIC HEART FAILURE (H): Primary | ICD-10-CM

## 2021-03-03 ENCOUNTER — OFFICE VISIT (OUTPATIENT)
Dept: CARDIOLOGY | Facility: CLINIC | Age: 56
End: 2021-03-03
Payer: COMMERCIAL

## 2021-03-03 VITALS
WEIGHT: 171.8 LBS | HEART RATE: 64 BPM | OXYGEN SATURATION: 97 % | SYSTOLIC BLOOD PRESSURE: 131 MMHG | BODY MASS INDEX: 26.91 KG/M2 | DIASTOLIC BLOOD PRESSURE: 84 MMHG

## 2021-03-03 DIAGNOSIS — I34.0 NONRHEUMATIC MITRAL VALVE REGURGITATION: ICD-10-CM

## 2021-03-03 DIAGNOSIS — I48.91 ATRIAL FIBRILLATION WITH RVR (H): ICD-10-CM

## 2021-03-03 DIAGNOSIS — I50.22 CHRONIC SYSTOLIC HEART FAILURE (H): Primary | ICD-10-CM

## 2021-03-03 DIAGNOSIS — Z78.9 ALCOHOL USE: ICD-10-CM

## 2021-03-03 DIAGNOSIS — I50.22 CHRONIC SYSTOLIC HEART FAILURE (H): ICD-10-CM

## 2021-03-03 DIAGNOSIS — I25.5 ISCHEMIC CARDIOMYOPATHY: ICD-10-CM

## 2021-03-03 LAB
ANION GAP SERPL CALCULATED.3IONS-SCNC: 4 MMOL/L (ref 3–14)
BUN SERPL-MCNC: 13 MG/DL (ref 7–30)
CALCIUM SERPL-MCNC: 9.4 MG/DL (ref 8.5–10.1)
CHLORIDE SERPL-SCNC: 109 MMOL/L (ref 94–109)
CO2 SERPL-SCNC: 28 MMOL/L (ref 20–32)
CREAT SERPL-MCNC: 1.28 MG/DL (ref 0.66–1.25)
GFR SERPL CREATININE-BSD FRML MDRD: 63 ML/MIN/{1.73_M2}
GLUCOSE SERPL-MCNC: 97 MG/DL (ref 70–99)
POTASSIUM SERPL-SCNC: 4.1 MMOL/L (ref 3.4–5.3)
SODIUM SERPL-SCNC: 141 MMOL/L (ref 133–144)

## 2021-03-03 PROCEDURE — 99214 OFFICE O/P EST MOD 30 MIN: CPT | Performed by: NURSE PRACTITIONER

## 2021-03-03 PROCEDURE — 80048 BASIC METABOLIC PNL TOTAL CA: CPT | Performed by: NURSE PRACTITIONER

## 2021-03-03 PROCEDURE — 80321 ALCOHOLS BIOMARKERS 1OR 2: CPT | Mod: 90 | Performed by: INTERNAL MEDICINE

## 2021-03-03 PROCEDURE — 99000 SPECIMEN HANDLING OFFICE-LAB: CPT | Performed by: INTERNAL MEDICINE

## 2021-03-03 PROCEDURE — 36415 COLL VENOUS BLD VENIPUNCTURE: CPT | Performed by: NURSE PRACTITIONER

## 2021-03-03 ASSESSMENT — PAIN SCALES - GENERAL: PAINLEVEL: NO PAIN (0)

## 2021-03-03 NOTE — PROGRESS NOTES
HPI: Cordell is a 55 year old White male with a past medical history of severe MR 2/2 flail P1 posterior mitral leaflet, CAD (s/p PCI to pLAD in 2019), pAF on Eliquis pta, alcohol use disorder c/b withdrawal seizures, and PE (Nov 2019) who was transferred from [facility] on 1/21/2021 for management of newly diagnosed cardiomyopathy (LVEF ~15-20%) and surgical consideration of known severe mitral regurgitation    Inpatient from 1/21-1/28- acute decompensated HFrEF in the setting of afib with RVR, alcohol intoxication and volume overload. An echocardiogram was obtained and showed severely reduced LV function (EF 15-20%) w/ severe diffuse hypokinesis, mild RVSD w/ mild dilation, and severe MR w/ a flail posterior mitral leaflet (p2 scallop). Euvolemia was achieved and has been maintained with 20mg PO lasix daily. Discharge weight is 167#. Additionally, his Toprol XL was increased to 50mg daily and he was started on Amiodarone. He is still getting use to his medications as he was just discharged a few days ago he says. Patient states he has no SOB /ARGUELLO . Patient has no swelling in the legs/ abdomen. Patient has a scale at home and weighs himself  Patient prepares his own meals. His fiance watches what he eats. He has been staying within the 64 oz of fluid he feels.  He has been sober since hospitalization. Weight today is 170 lbs . Appetite is good. Just increased the Lisinopril to 5 mg about 4 days ago.     Denies SOB, ARGUELLO, PND, orthopnea, edema, weight gain, chest pain, palpitations, lightheadedness, dizziness, near syncopal/syncopal episodes. Cordell has been following salt and fluid restrictions.          PAST MEDICAL HISTORY:  No past medical history on file.    FAMILY HISTORY:  No family history on file.    SOCIAL HISTORY:  Social History     Tobacco Use     Smoking status: Never Smoker     Smokeless tobacco: Never Used   Substance Use Topics     Alcohol use: None     Drug use: None           CURRENT  MEDICATIONS:  Current Outpatient Medications   Medication Sig Dispense Refill     acetaminophen (TYLENOL) 325 MG tablet Take 2 tablets (650 mg) by mouth every 4 hours as needed for mild pain 30 tablet 0     amiodarone (PACERONE) 200 MG tablet Take 1 tablet (200 mg) by mouth 2 times daily 180 tablet 3     aspirin (ASA) 81 MG chewable tablet Take 1 tablet (81 mg) by mouth daily 90 tablet 3     atorvastatin (LIPITOR) 40 MG tablet Take 1 tablet (40 mg) by mouth every evening 90 tablet 3     folic acid (FOLVITE) 1 MG tablet Take 1 tablet (1 mg) by mouth daily 30 tablet 0     furosemide (LASIX) 20 MG tablet Take 1 tablet (20 mg) by mouth daily 90 tablet 3     lisinopril (ZESTRIL) 5 MG tablet Take 1 tablet (5 mg) by mouth daily 90 tablet 3     metoprolol succinate ER (TOPROL-XL) 50 MG 24 hr tablet Take 1 tablet (50 mg) by mouth daily 90 tablet 3     polyethylene glycol (MIRALAX) 17 GM/Dose powder Take 17 g by mouth daily 510 g 0     rivaroxaban ANTICOAGULANT (XARELTO) 20 MG TABS tablet Take 1 tablet (20 mg) by mouth daily (with dinner) 90 tablet 3       ROS:  Review Of Systems  Skin: negative  Eyes: negative  Ears/Nose/Throat: negative  Respiratory: No shortness of breath, dyspnea on exertion, cough, or hemoptysis  Cardiovascular: negative  Gastrointestinal: negative  Genitourinary: negative  Musculoskeletal: negative  Neurologic: negative  Psychiatric: negative  Hematologic/Lymphatic/Immunologic: negative  Endocrine: negative      EXAM:  /84 (BP Location: Left arm, Patient Position: Sitting, Cuff Size: Adult Regular)   Pulse 64   Wt 77.9 kg (171 lb 12.8 oz)   SpO2 97%   BMI 26.91 kg/m    Home weight:  General: alert, articulate, and in no acute distress.  HEENT: normocephalic, atraumatic, anicteric sclera, EOMI, mucosa moist, no cyanosis.   Neck: neck supple.  No adenopathy, masses, or carotid bruits.  JVP not detected at 45 degrees  Heart: regular rhythm, normal S1/S2, no murmur, gallop, rub.  Precordium quiet  with normal PMI.     Lungs: clear, no rales, ronchi, or wheezing.  No accessory muscle use, respirations unlabored.   Abdomen: soft, non-tender, bowel sounds present, no hepatomegaly  Extremities:no edema.   No cyanosis.    Neurological: alert and oriented x 3.  normal speech and affect, no gross motor deficits  Skin:  No ecchymoses, rashes, or clubbing.    Labs:  CBC RESULTS:  Lab Results   Component Value Date    WBC 3.8 (L) 01/27/2021    RBC 4.41 01/27/2021    HGB 15.0 01/27/2021    HCT 45.3 01/27/2021     (H) 01/27/2021    MCH 34.0 (H) 01/27/2021    MCHC 33.1 01/27/2021    RDW 15.7 (H) 01/27/2021     01/27/2021       CMP RESULTS:  Lab Results   Component Value Date     03/03/2021    POTASSIUM 4.1 03/03/2021    CHLORIDE 109 03/03/2021    CO2 PENDING 03/03/2021    ANIONGAP PENDING 03/03/2021    GLC PENDING 03/03/2021    BUN PENDING 03/03/2021    CR PENDING 03/03/2021    GFRESTIMATED PENDING 03/03/2021    GFRESTBLACK PENDING 03/03/2021    BRAD PENDING 03/03/2021    BILITOTAL 1.7 (H) 01/28/2021    ALBUMIN 2.8 (L) 01/28/2021    ALKPHOS 100 01/28/2021     (H) 01/28/2021    AST 80 (H) 01/28/2021        INR RESULTS:  Lab Results   Component Value Date    INR 1.02 01/25/2021       No components found for: CK  Lab Results   Component Value Date    MAG 2.1 01/26/2021     Lab Results   Component Value Date    NTBNP 838 (H) 02/18/2021     @BRIEFLABR (dig)@    Most recent echocardiogram:  No results found for this or any previous visit (from the past 8760 hour(s)).      Assessment and Plan:    In summary,Cordell  is a 55 year old male with history of severe MR 2/2 flail P1 posterior mitral leaflet, CAD (s/p PCI to pLAD in 2019), pAF on Eliquis pta, alcohol use disorder c/b withdrawal seizures, and PE (Nov 2019) who was transferred from [facility] on 1/21/2021 for management of newly diagnosed cardiomyopathy (LVEF ~15-20%) and surgical consideration of known severe mitral regurgitation.      Today is his initial CORE visit. He appears to be euvolemic today and doing well. Since he just increased the Lisinopril 4 days ago . I will not increase again today . Will see him in follow up in 2 weeks and we will increase it at that visit if tolerated.     1.  Chronic sysotlic heart failure secondary to ICM.  Stage C  NYHA Class III  ACEi/ARB: yes- lisinopril 5 mg   BB: yes-Toprol 50 mg   Aldosterone antagonist: deferred while other medical therapy is prioritized- future add -on  SCD prophylaxis: decision deferred during medication uptitration  Fluid status: euvolemic- furosemide 20 mg daily  Anticoagulation: xaralto  Antiplatelet:  ASA dose   Sleep apnea:not discussed  NSAID use:  Contraindicated.  Avoid use.  Remote Monitoring:none    2.  Other comordbid conditions:     # Severe mitral regurgitation  2/2 flail posterior mitral leaflet.  During admission at the [facility], he was seen by interventional cardiology and structural cardiology. There is concern at this time that he would tolerate complete surgical open repair given markedly reduced LVEF- may have difficulty coming off bypass. Other option would be a mitral clip. - appt tomorrow    # Paroxysmal Afib with RVR, currently relatively rate controlled (-110/min)  Rates at OSH 110s-180s recently, likely 2/2 alcohol withdrawal, decompensated heart failure and aspiration pneumonia.  Prior to hospitalization was prescribed Amiodarone 200mg, Metoprolol succinate 50mg [but patient was not taking]. Cardioverted once in 2018 in setting of etoh withdrawal.    #Alcoholic hepatitis-alcohol cessation counseling in the hospital. PCP to manage    # Alcohol use disorder  EtOH 168 mg/dL at OSH. Last drink was 1/16. Requiring PO diazepam at OSH for withdrawal. CD consult at OSH offered counseling and resource information. - still refraining from Alcohol - PCP to follow    3.  Follow-up   CORE in 2 weeks - lab        Johnny MURPHY CNP  [facility]

## 2021-03-03 NOTE — NURSING NOTE
Cordell Adams Jr.'s goals for this visit include:   Chief Complaint   Patient presents with     RECHECK       He requests these members of his care team be copied on today's visit information: no    PCP: Becca Morton    Referring Provider:  No referring provider defined for this encounter.    /84 (BP Location: Left arm, Patient Position: Sitting, Cuff Size: Adult Regular)   Pulse 64   Wt 77.9 kg (171 lb 12.8 oz)   SpO2 97%   BMI 26.91 kg/m      Do you need any medication refills at today's visit? No    Mercy Altamirano CMA......March 3, 2021     2:23 PM

## 2021-03-03 NOTE — PATIENT INSTRUCTIONS
Take your medicines every day, as directed    Changes made today:  o Continue current dose of Lisinopril   o    Monitor Your Weight and Symptoms    Contact us if you:      Gain 2 pounds in one day or 5 pounds in one week    Feel more short of breath    Notice more leg swelling    Feel lightheadeded   Change your lifestyle    Limit Salt or Sodium:    2000 mg  Limit Fluids:    2000 mL or approximately 64 ounces  Eat a Heart Healthy Diet    Low in saturated fats  Stay Active:    Aim to move at least 150 minutes every  week         To Contact us    During Business Hours:  [phone], option # 1 (University)  Then option # 4 (medical questions)     After hours, weekends or holidays:   [phone], Option #4  Ask to speak to the On-Call Cardiologist. Inform them you are a CORE/heart failure patient at the [facility].     Use [employer] allows you to communicate directly with your heart team through secure messaging.    [employer] can be accessed any time on your phone, computer, or tablet.    If you need assistance, we'd be happy to help!         Keep your Heart Appointments:    Core in 2 weeks with labs prior

## 2021-03-04 ENCOUNTER — OFFICE VISIT (OUTPATIENT)
Dept: CARDIOLOGY | Facility: CLINIC | Age: 56
End: 2021-03-04
Attending: INTERNAL MEDICINE
Payer: COMMERCIAL

## 2021-03-04 VITALS
HEIGHT: 68 IN | BODY MASS INDEX: 25.91 KG/M2 | WEIGHT: 171 LBS | OXYGEN SATURATION: 95 % | DIASTOLIC BLOOD PRESSURE: 82 MMHG | HEART RATE: 76 BPM | SYSTOLIC BLOOD PRESSURE: 128 MMHG

## 2021-03-04 DIAGNOSIS — I34.0 NONRHEUMATIC MITRAL VALVE REGURGITATION: ICD-10-CM

## 2021-03-04 DIAGNOSIS — I34.0 NONRHEUMATIC MITRAL VALVE REGURGITATION: Primary | ICD-10-CM

## 2021-03-04 PROCEDURE — 99215 OFFICE O/P EST HI 40 MIN: CPT | Mod: 25 | Performed by: INTERNAL MEDICINE

## 2021-03-04 PROCEDURE — G0463 HOSPITAL OUTPT CLINIC VISIT: HCPCS

## 2021-03-04 PROCEDURE — 93306 TTE W/DOPPLER COMPLETE: CPT | Mod: GC | Performed by: INTERNAL MEDICINE

## 2021-03-04 ASSESSMENT — MIFFLIN-ST. JEOR: SCORE: 1587.21

## 2021-03-04 ASSESSMENT — PAIN SCALES - GENERAL: PAINLEVEL: NO PAIN (0)

## 2021-03-04 NOTE — PROGRESS NOTES
[facility] HEART [facility]  CARDIOVASCULAR DIVISION    VALVE CLINIC CONSULTATION    PRIMARY CARDIOLOGIST: Dr. Wade      PERTINENT CLINICAL HISTORY & REASON FOR CONSULTATION:     Cordell Adams Jr. is a very pleasant 55 year old male with severe mitral regurgitation referred to our clinic for evaluation and consideration for potential transcatheter mitral valve repair with a Mitraclip. He has a history of nonischemic cardiomyopathy secondary to alcohol abuse EF 10-20%, a-fib on amiodarone and Xarelto, PE (Nov 2019) CAD s/p LAD PCI 2019 w/recent cath 1/2021 w/patent LAD stents and severe mitral regurgitation secondary to flail P1 posterior leaflet.     Mr. Adams was transferred from [facility] on 1/21/2021 for management of newly diagnosed cardiomyopathy (LVEF ~15-20%) and surgical consideration of known severe mitral regurgitation. He was diuresed and started on goal directed medical therapy. He underwent coronary angiogram showing patent LAD stent and RHC showing mildly elevated filling pressures. He was seen by CV surgery and was thought to be high risk for surgery given reduced LVEF. It was recommended that he seek chem dependency for alcohol abuse and continue GDMT with plans to repeat prior to deciding surgery vs clip.   The patient discharged home and he does not drink alcohol since then.   The patient states that he has been very well without much symptoms.  [place] heart association class II symptoms.  No chest pain.  No shortness of breath or orthopnea.  No leg edema.  The patient is here for follow-up appointment about mitral regurgitation.              PAST MEDICAL HISTORY:   No past medical history on file.     PAST SURGICAL HISTORY:     Past Surgical History:   Procedure Laterality Date     CV CORONARY ANGIOGRAM N/A 1/25/2021    Procedure: Coronary Angiogram;  Surgeon: Cresencio Becker MD;  Location:  HEART CARDIAC CATH LAB     CV RIGHT HEART CATH MEASUREMENTS RECORDED  N/A 1/25/2021    Procedure: Right Heart Cath;  Surgeon: Cresencio Becker MD;  Location:  HEART CARDIAC CATH LAB        CURRENT MEDICATIONS:     Current Outpatient Medications   Medication Sig Dispense Refill     acetaminophen (TYLENOL) 325 MG tablet Take 2 tablets (650 mg) by mouth every 4 hours as needed for mild pain 30 tablet 0     amiodarone (PACERONE) 200 MG tablet Take 1 tablet (200 mg) by mouth 2 times daily 180 tablet 3     aspirin (ASA) 81 MG chewable tablet Take 1 tablet (81 mg) by mouth daily 90 tablet 3     atorvastatin (LIPITOR) 40 MG tablet Take 1 tablet (40 mg) by mouth every evening 90 tablet 3     folic acid (FOLVITE) 1 MG tablet Take 1 tablet (1 mg) by mouth daily 30 tablet 0     furosemide (LASIX) 20 MG tablet Take 1 tablet (20 mg) by mouth daily 90 tablet 3     lisinopril (ZESTRIL) 5 MG tablet Take 1 tablet (5 mg) by mouth daily 90 tablet 3     metoprolol succinate ER (TOPROL-XL) 50 MG 24 hr tablet Take 1 tablet (50 mg) by mouth daily 90 tablet 3     polyethylene glycol (MIRALAX) 17 GM/Dose powder Take 17 g by mouth daily 510 g 0     rivaroxaban ANTICOAGULANT (XARELTO) 20 MG TABS tablet Take 1 tablet (20 mg) by mouth daily (with dinner) 90 tablet 3        ALLERGIES:   No Known Allergies     FAMILY HISTORY:   No family history on file.     SOCIAL HISTORY:     Social History     Socioeconomic History     Marital status: Single     Spouse name: Not on file     Number of children: Not on file     Years of education: Not on file     Highest education level: Not on file   Occupational History     Not on file   Social Needs     Financial resource strain: Not on file     Food insecurity     Worry: Not on file     Inability: Not on file     Transportation needs     Medical: Not on file     Non-medical: Not on file   Tobacco Use     Smoking status: Never Smoker     Smokeless tobacco: Never Used   Substance and Sexual Activity     Alcohol use: Not on file     Drug use: Not on file     Sexual  "activity: Not on file   Lifestyle     Physical activity     Days per week: Not on file     Minutes per session: Not on file     Stress: Not on file   Relationships     Social connections     Talks on phone: Not on file     Gets together: Not on file     Attends Orthodox service: Not on file     Active member of club or organization: Not on file     Attends meetings of clubs or organizations: Not on file     Relationship status: Not on file     Intimate partner violence     Fear of current or ex partner: Not on file     Emotionally abused: Not on file     Physically abused: Not on file     Forced sexual activity: Not on file   Other Topics Concern     Not on file   Social History Narrative     Not on file        REVIEW OF SYSTEMS:     Constitutional: No fevers or chills  Skin: No new rash or itching  Eyes: No acute change in vision  Ears/Nose/Throat: No purulent rhinorrhea, new hearing loss, or new vertigo  Respiratory: No cough or hemoptysis  Cardiovascular: See HPI  Gastrointestinal: No change in appetite, vomiting, hematemesis or diarrhea  Genitourinary: No dysuria or hematuria  Musculoskeletal: No new back pain, neck pain or muscle pain  Neurologic: No new headaches, focal weakness or behavior changes  Psychiatric: No hallucinations, excessive alcohol consumption or illegal drug usage  Hematologic/Lymphatic/Immunologic: No bleeding, chills, fever, night sweats or weight loss  Endocrine: No new cold intolerance, heat intolerance, polyphagia, polydipsia or polyuria      PHYSICAL EXAMINATION:     /82 (BP Location: Right arm, Patient Position: Chair, Cuff Size: Adult Regular)   Pulse 76   Ht 1.715 m (5' 7.5\")   Wt 77.6 kg (171 lb)   SpO2 95%   BMI 26.39 kg/m      GENERAL: No acute distress.  HEENT: EOMI. Sclerae white, not injected. Nares clear. Pharynx without erythema or exudate.   Neck: No adenopathy. No thyromegaly. No jugular venous distension.   Heart: Regular rate and rhythm.3/6 systolic " murmur.  Lungs: Clear to auscultation. No ronchi, wheezes, rales.   Abdomen: Soft, nontender, nondistended. Bowel sounds present.  Extremities: No clubbing, cyanosis, or edema.   Neurologic: Alert and oriented to person/place/time, normal speech and affect. No focal deficits.  Skin: No petechiae, purpura or rash.     LABORATORY DATA:     LIPID RESULTS:  No results found for: CHOL, HDL, LDL, TRIG, CHOLHDLRATIO    LIVER ENZYME RESULTS:  Lab Results   Component Value Date    AST 80 (H) 01/28/2021     (H) 01/28/2021       CBC RESULTS:  Lab Results   Component Value Date    WBC 3.8 (L) 01/27/2021    RBC 4.41 01/27/2021    HGB 15.0 01/27/2021    HCT 45.3 01/27/2021     (H) 01/27/2021    MCH 34.0 (H) 01/27/2021    MCHC 33.1 01/27/2021    RDW 15.7 (H) 01/27/2021     01/27/2021       BMP RESULTS:  Lab Results   Component Value Date     03/03/2021    POTASSIUM 4.1 03/03/2021    CHLORIDE 109 03/03/2021    CO2 28 03/03/2021    ANIONGAP 4 03/03/2021    GLC 97 03/03/2021    BUN 13 03/03/2021    CR 1.28 (H) 03/03/2021    GFRESTIMATED 63 03/03/2021    GFRESTBLACK 73 03/03/2021    BRAD 9.4 03/03/2021        A1C RESULTS:  No results found for: A1C    INR RESULTS:  Lab Results   Component Value Date    INR 1.02 01/25/2021    INR 1.23 (H) 01/21/2021          PROCEDURES & FURTHER ASSESSMENTS:     ECG dated 1/21/2021  Atrial fibrillation RVR.    Echocardiogram today: Pending.           CLINICAL IMPRESSION:     Cordell Reyesnikole Moody is a very pleasant 55 year old male with severe mitral regurgitation referred to our clinic for evaluation and consideration for potential transcatheter mitral valve repair with a Mitraclip. He has a history of nonischemic cardiomyopathy secondary to alcohol abuse EF 10-20%, a-fib on amiodarone and Xarelto, PE (Nov 2019) CAD s/p LAD PCI 2019 w/recent cath 1/2021 w/patent LAD stents and severe mitral regurgitation secondary to flail P1 posterior leaflet.   His LVEF is recovered per  today's echocardiogram.  Mitral regurgitation severity is somewhat moderate to severe which is less severe than the last echocardiogram.  Now he is a good candidate for surgical intervention to mitral regurgitation.  We would like to wait a few months for full recovery of LVEF and then we would perform MENDEL to assess mitral regurgitation again about candidacy of valvuloplasty or mitral clip.    -Severe mitral regurgitation P1 frail  Recovered LVEF, alcoholic cardiomyopathy  Atrial fibrillation on rivaroxaban  History of coronary artery disease PCI to LAD in 2019, patent stent in 01/2021     Plan Summary:  -MENDEL in 2 to 3 months and consider cardiovascular surgery consultation      Patient was evaluated in clinic with Dr. Aranda.    Souleymane Turk MD  Interventional Cardiology Fellow [id number]  [facility] Cardiology Team    Greater than 60 minutes were spent with patient and family providing education regarding progression of aortic stenosis, symptom recognition and management as well as approach to treatment and post-procedural expectations.     CC  Patient Care Team:  Becca Morton MD as PCP - General (Family Medicine)  Johnny Gonzales APRN CNP as Assigned PCP  Susana Workman RN as Specialty Care Coordinator (Cardiology)  Leny Wade MD as Assigned Heart and Vascular Provider

## 2021-03-04 NOTE — PATIENT INSTRUCTIONS
You were seen today in the Cardiovascular Clinic at the [facility].      Cardiology Providers you saw during your visit:  Dr. Frank Aranda    Recommendations:    --MENDEL in 3 months    After the MENDEL, Dr. Aranda will review the images.  After review, their will be a discussion if the MitraClip or open surgery will be the best option for you.     Pre-procedure Instructions:    Transesophageal Echocardiogram (MENDEL)  1.  Plan on someone being able to drive you home after the procedure.  2.  Nothing to eat or drink 6 hours before the procedure.  It is ok to take your morning medications with a sip of water.  3.  If you have diabetes, do not take your oral medication in the morning.  4.  Report to the Gold Waiting room in the hospital 15 minutes before hand.  5.  Plan on approximately 2 hours in the hospital    [facility], [place] [id number]      After all of your imaging/testing is completed, your case will be discussed at our Valve conference.  After this conference, the recommendations will be discussed with you, along with the appropriate follow up appointments.        For questions, you may call the following numbers:    1.  [phone]:  [facility] Structural Heart Lead CMA  2.  Nursing questions: Jinny Stone RN:  [phone]  3.  For emergencies call 101.    It was a pleasure to see you in clinic.  If you have any questions at all, please feel free to give me a call.      Jinny Stone RN  Structural Heart Care Coordinator   TAVR and MitraClip Programs  [facility]  Office: [phone]    Clinics and Surgery Center  [address] [place] [address]  Cardiology Clinic [place] [address] [place] [id number]

## 2021-03-04 NOTE — NURSING NOTE
Chief Complaint   Patient presents with     New Patient     Possible Mitraclip     Vitals were taken and medication reconciled.    ALBA Brian  12:35 PM

## 2021-03-04 NOTE — LETTER
3/4/2021      RE: Cordell Adams Jr.  [address]  [facility] [address]       Dear Colleague,    Thank you for the opportunity to participate in the care of your patient, Cordell Adams Jr., at the [facility] [place] [facility]. Please see a copy of my visit note below.        [facility] HEART CARE  CARDIOVASCULAR DIVISION    VALVE CLINIC CONSULTATION    PRIMARY CARDIOLOGIST: Dr. Wade      PERTINENT CLINICAL HISTORY & REASON FOR CONSULTATION:     Cordell Adams Jr. is a very pleasant 55 year old male with severe mitral regurgitation referred to our clinic for evaluation and consideration for potential transcatheter mitral valve repair with a Mitraclip. He has a history of nonischemic cardiomyopathy secondary to alcohol abuse EF 10-20%, a-fib on amiodarone and Xarelto, PE (Nov 2019) CAD s/p LAD PCI 2019 w/recent cath 1/2021 w/patent LAD stents and severe mitral regurgitation secondary to flail P1 posterior leaflet.     Mr. Adams was transferred from [facility] on 1/21/2021 for management of newly diagnosed cardiomyopathy (LVEF ~15-20%) and surgical consideration of known severe mitral regurgitation. He was diuresed and started on goal directed medical therapy. He underwent coronary angiogram showing patent LAD stent and RHC showing mildly elevated filling pressures. He was seen by CV surgery and was thought to be high risk for surgery given reduced LVEF. It was recommended that he seek chem dependency for alcohol abuse and continue GDMT with plans to repeat prior to deciding surgery vs clip.   The patient discharged home and he does not drink alcohol since then.   The patient states that he has been very well without much symptoms.  [place] heart association class II symptoms.  No chest pain.  No shortness of breath or orthopnea.  No leg edema.  The patient is here for follow-up appointment about  mitral regurgitation.              PAST MEDICAL HISTORY:   No past medical history on file.     PAST SURGICAL HISTORY:     Past Surgical History:   Procedure Laterality Date     CV CORONARY ANGIOGRAM N/A 1/25/2021    Procedure: Coronary Angiogram;  Surgeon: Cresencio Becker MD;  Location:  HEART CARDIAC CATH LAB     CV RIGHT HEART CATH MEASUREMENTS RECORDED N/A 1/25/2021    Procedure: Right Heart Cath;  Surgeon: Cresencio Becker MD;  Location:  HEART CARDIAC CATH LAB        CURRENT MEDICATIONS:     Current Outpatient Medications   Medication Sig Dispense Refill     acetaminophen (TYLENOL) 325 MG tablet Take 2 tablets (650 mg) by mouth every 4 hours as needed for mild pain 30 tablet 0     amiodarone (PACERONE) 200 MG tablet Take 1 tablet (200 mg) by mouth 2 times daily 180 tablet 3     aspirin (ASA) 81 MG chewable tablet Take 1 tablet (81 mg) by mouth daily 90 tablet 3     atorvastatin (LIPITOR) 40 MG tablet Take 1 tablet (40 mg) by mouth every evening 90 tablet 3     folic acid (FOLVITE) 1 MG tablet Take 1 tablet (1 mg) by mouth daily 30 tablet 0     furosemide (LASIX) 20 MG tablet Take 1 tablet (20 mg) by mouth daily 90 tablet 3     lisinopril (ZESTRIL) 5 MG tablet Take 1 tablet (5 mg) by mouth daily 90 tablet 3     metoprolol succinate ER (TOPROL-XL) 50 MG 24 hr tablet Take 1 tablet (50 mg) by mouth daily 90 tablet 3     polyethylene glycol (MIRALAX) 17 GM/Dose powder Take 17 g by mouth daily 510 g 0     rivaroxaban ANTICOAGULANT (XARELTO) 20 MG TABS tablet Take 1 tablet (20 mg) by mouth daily (with dinner) 90 tablet 3        ALLERGIES:   No Known Allergies     FAMILY HISTORY:   No family history on file.     SOCIAL HISTORY:     Social History     Socioeconomic History     Marital status: Single     Spouse name: Not on file     Number of children: Not on file     Years of education: Not on file     Highest education level: Not on file   Occupational History     Not on file   Social Needs      Financial resource strain: Not on file     Food insecurity     Worry: Not on file     Inability: Not on file     Transportation needs     Medical: Not on file     Non-medical: Not on file   Tobacco Use     Smoking status: Never Smoker     Smokeless tobacco: Never Used   Substance and Sexual Activity     Alcohol use: Not on file     Drug use: Not on file     Sexual activity: Not on file   Lifestyle     Physical activity     Days per week: Not on file     Minutes per session: Not on file     Stress: Not on file   Relationships     Social connections     Talks on phone: Not on file     Gets together: Not on file     Attends Zoroastrianism service: Not on file     Active member of club or organization: Not on file     Attends meetings of clubs or organizations: Not on file     Relationship status: Not on file     Intimate partner violence     Fear of current or ex partner: Not on file     Emotionally abused: Not on file     Physically abused: Not on file     Forced sexual activity: Not on file   Other Topics Concern     Not on file   Social History Narrative     Not on file        REVIEW OF SYSTEMS:     Constitutional: No fevers or chills  Skin: No new rash or itching  Eyes: No acute change in vision  Ears/Nose/Throat: No purulent rhinorrhea, new hearing loss, or new vertigo  Respiratory: No cough or hemoptysis  Cardiovascular: See HPI  Gastrointestinal: No change in appetite, vomiting, hematemesis or diarrhea  Genitourinary: No dysuria or hematuria  Musculoskeletal: No new back pain, neck pain or muscle pain  Neurologic: No new headaches, focal weakness or behavior changes  Psychiatric: No hallucinations, excessive alcohol consumption or illegal drug usage  Hematologic/Lymphatic/Immunologic: No bleeding, chills, fever, night sweats or weight loss  Endocrine: No new cold intolerance, heat intolerance, polyphagia, polydipsia or polyuria      PHYSICAL EXAMINATION:     /82 (BP Location: Right arm, Patient Position: Chair,  "Cuff Size: Adult Regular)   Pulse 76   Ht 1.715 m (5' 7.5\")   Wt 77.6 kg (171 lb)   SpO2 95%   BMI 26.39 kg/m      GENERAL: No acute distress.  HEENT: EOMI. Sclerae white, not injected. Nares clear. Pharynx without erythema or exudate.   Neck: No adenopathy. No thyromegaly. No jugular venous distension.   Heart: Regular rate and rhythm.3/6 systolic murmur.  Lungs: Clear to auscultation. No ronchi, wheezes, rales.   Abdomen: Soft, nontender, nondistended. Bowel sounds present.  Extremities: No clubbing, cyanosis, or edema.   Neurologic: Alert and oriented to person/place/time, normal speech and affect. No focal deficits.  Skin: No petechiae, purpura or rash.     LABORATORY DATA:     LIPID RESULTS:  No results found for: CHOL, HDL, LDL, TRIG, CHOLHDLRATIO    LIVER ENZYME RESULTS:  Lab Results   Component Value Date    AST 80 (H) 01/28/2021     (H) 01/28/2021       CBC RESULTS:  Lab Results   Component Value Date    WBC 3.8 (L) 01/27/2021    RBC 4.41 01/27/2021    HGB 15.0 01/27/2021    HCT 45.3 01/27/2021     (H) 01/27/2021    MCH 34.0 (H) 01/27/2021    MCHC 33.1 01/27/2021    RDW 15.7 (H) 01/27/2021     01/27/2021       BMP RESULTS:  Lab Results   Component Value Date     03/03/2021    POTASSIUM 4.1 03/03/2021    CHLORIDE 109 03/03/2021    CO2 28 03/03/2021    ANIONGAP 4 03/03/2021    GLC 97 03/03/2021    BUN 13 03/03/2021    CR 1.28 (H) 03/03/2021    GFRESTIMATED 63 03/03/2021    GFRESTBLACK 73 03/03/2021    BRAD 9.4 03/03/2021        A1C RESULTS:  No results found for: A1C    INR RESULTS:  Lab Results   Component Value Date    INR 1.02 01/25/2021    INR 1.23 (H) 01/21/2021          PROCEDURES & FURTHER ASSESSMENTS:     ECG dated 1/21/2021  Atrial fibrillation RVR.    Echocardiogram today: Pending.           CLINICAL IMPRESSION:     Cordell Adams Jr. is a very pleasant 55 year old male with severe mitral regurgitation referred to our clinic for evaluation and consideration for " potential transcatheter mitral valve repair with a Mitraclip. He has a history of nonischemic cardiomyopathy secondary to alcohol abuse EF 10-20%, a-fib on amiodarone and Xarelto, PE (Nov 2019) CAD s/p LAD PCI 2019 w/recent cath 1/2021 w/patent LAD stents and severe mitral regurgitation secondary to flail P1 posterior leaflet.   His LVEF is recovered per today's echocardiogram.  Mitral regurgitation severity is somewhat moderate to severe which is less severe than the last echocardiogram.  Now he is a good candidate for surgical intervention to mitral regurgitation.  We would like to wait a few months for full recovery of LVEF and then we would perform MENDEL to assess mitral regurgitation again about candidacy of valvuloplasty or mitral clip.    -Severe mitral regurgitation P1 frail  Recovered LVEF, alcoholic cardiomyopathy  Atrial fibrillation on rivaroxaban  History of coronary artery disease PCI to LAD in 2019, patent stent in 01/2021     Plan Summary:  -MENDEL in 2 to 3 months and consider cardiovascular surgery consultation      Patient was evaluated in clinic with Dr. Aranda.    Souleymane Turk MD  Interventional Cardiology Fellow [id number]  [facility] Cardiology Team    Greater than 60 minutes were spent with patient and family providing education regarding progression of aortic stenosis, symptom recognition and management as well as approach to treatment and post-procedural expectations.     CC  Patient Care Team:  Becca Morton MD as PCP - General (Family Medicine)  Johnny Gonzales APRN CNP as Assigned PCP  Susana Workman RN as Specialty Care Coordinator (Cardiology)  Leny Wade MD as Assigned Heart and Vascular Provider              [facility] HEART CARE  CARDIOVASCULAR DIVISION    VALVE CLINIC CONSULTATION    PRIMARY CARDIOLOGIST: Dr. Wade      PERTINENT CLINICAL HISTORY & REASON FOR CONSULTATION:     Cordell Adams  is a very pleasant 55 year old male with severe mitral  regurgitation referred to our clinic for evaluation and consideration for potential transcatheter mitral valve repair with a Mitraclip. He has a history of nonischemic cardiomyopathy secondary to alcohol abuse EF 10-20%, a-fib on amiodarone and Xarelto, PE (Nov 2019) CAD s/p LAD PCI 2019 w/recent cath 1/2021 w/patent LAD stents and severe mitral regurgitation secondary to flail P1 posterior leaflet.     Mr. Adams was transferred from [facility] on 1/21/2021 for management of newly diagnosed cardiomyopathy (LVEF ~15-20%) and surgical consideration of known severe mitral regurgitation. He was diuresed and started on goal directed medical therapy. He underwent coronary angiogram showing patent LAD stent and RHC showing mildly elevated filling pressures. He was seen by CV surgery and was thought to be high risk for surgery given reduced LVEF. It was recommended that he seek chem dependency for alcohol abuse and continue GDMT with plans to repeat prior to deciding surgery vs clip.   The patient discharged home and he does not drink alcohol since then.   The patient states that he has been very well without much symptoms.  [place] heart association class II symptoms.  No chest pain.  No shortness of breath or orthopnea.  No leg edema.  The patient is here for follow-up appointment about mitral regurgitation.              PAST MEDICAL HISTORY:   No past medical history on file.     PAST SURGICAL HISTORY:     Past Surgical History:   Procedure Laterality Date     CV CORONARY ANGIOGRAM N/A 1/25/2021    Procedure: Coronary Angiogram;  Surgeon: Cresencio Becker MD;  Location:  HEART CARDIAC CATH LAB     CV RIGHT HEART CATH MEASUREMENTS RECORDED N/A 1/25/2021    Procedure: Right Heart Cath;  Surgeon: Cresencio Becker MD;  Location:  HEART CARDIAC CATH LAB        CURRENT MEDICATIONS:     Current Outpatient Medications   Medication Sig Dispense Refill     acetaminophen (TYLENOL) 325 MG tablet  Take 2 tablets (650 mg) by mouth every 4 hours as needed for mild pain 30 tablet 0     amiodarone (PACERONE) 200 MG tablet Take 1 tablet (200 mg) by mouth 2 times daily 180 tablet 3     aspirin (ASA) 81 MG chewable tablet Take 1 tablet (81 mg) by mouth daily 90 tablet 3     atorvastatin (LIPITOR) 40 MG tablet Take 1 tablet (40 mg) by mouth every evening 90 tablet 3     folic acid (FOLVITE) 1 MG tablet Take 1 tablet (1 mg) by mouth daily 30 tablet 0     furosemide (LASIX) 20 MG tablet Take 1 tablet (20 mg) by mouth daily 90 tablet 3     lisinopril (ZESTRIL) 5 MG tablet Take 1 tablet (5 mg) by mouth daily 90 tablet 3     metoprolol succinate ER (TOPROL-XL) 50 MG 24 hr tablet Take 1 tablet (50 mg) by mouth daily 90 tablet 3     polyethylene glycol (MIRALAX) 17 GM/Dose powder Take 17 g by mouth daily 510 g 0     rivaroxaban ANTICOAGULANT (XARELTO) 20 MG TABS tablet Take 1 tablet (20 mg) by mouth daily (with dinner) 90 tablet 3        ALLERGIES:   No Known Allergies     FAMILY HISTORY:   No family history on file.     SOCIAL HISTORY:     Social History     Socioeconomic History     Marital status: Single     Spouse name: Not on file     Number of children: Not on file     Years of education: Not on file     Highest education level: Not on file   Occupational History     Not on file   Social Needs     Financial resource strain: Not on file     Food insecurity     Worry: Not on file     Inability: Not on file     Transportation needs     Medical: Not on file     Non-medical: Not on file   Tobacco Use     Smoking status: Never Smoker     Smokeless tobacco: Never Used   Substance and Sexual Activity     Alcohol use: Not on file     Drug use: Not on file     Sexual activity: Not on file   Lifestyle     Physical activity     Days per week: Not on file     Minutes per session: Not on file     Stress: Not on file   Relationships     Social connections     Talks on phone: Not on file     Gets together: Not on file     Attends  "Nondenominational service: Not on file     Active member of club or organization: Not on file     Attends meetings of clubs or organizations: Not on file     Relationship status: Not on file     Intimate partner violence     Fear of current or ex partner: Not on file     Emotionally abused: Not on file     Physically abused: Not on file     Forced sexual activity: Not on file   Other Topics Concern     Not on file   Social History Narrative     Not on file        REVIEW OF SYSTEMS:     Constitutional: No fevers or chills  Skin: No new rash or itching  Eyes: No acute change in vision  Ears/Nose/Throat: No purulent rhinorrhea, new hearing loss, or new vertigo  Respiratory: No cough or hemoptysis  Cardiovascular: See HPI  Gastrointestinal: No change in appetite, vomiting, hematemesis or diarrhea  Genitourinary: No dysuria or hematuria  Musculoskeletal: No new back pain, neck pain or muscle pain  Neurologic: No new headaches, focal weakness or behavior changes  Psychiatric: No hallucinations, excessive alcohol consumption or illegal drug usage  Hematologic/Lymphatic/Immunologic: No bleeding, chills, fever, night sweats or weight loss  Endocrine: No new cold intolerance, heat intolerance, polyphagia, polydipsia or polyuria      PHYSICAL EXAMINATION:     /82 (BP Location: Right arm, Patient Position: Chair, Cuff Size: Adult Regular)   Pulse 76   Ht 1.715 m (5' 7.5\")   Wt 77.6 kg (171 lb)   SpO2 95%   BMI 26.39 kg/m      GENERAL: No acute distress.  HEENT: EOMI. Sclerae white, not injected. Nares clear. Pharynx without erythema or exudate.   Neck: No adenopathy. No thyromegaly. No jugular venous distension.   Heart: Regular rate and rhythm.3/6 systolic murmur.  Lungs: Clear to auscultation. No ronchi, wheezes, rales.   Abdomen: Soft, nontender, nondistended. Bowel sounds present.  Extremities: No clubbing, cyanosis, or edema.   Neurologic: Alert and oriented to person/place/time, normal speech and affect. No focal " deficits.  Skin: No petechiae, purpura or rash.     LABORATORY DATA:     LIPID RESULTS:  No results found for: CHOL, HDL, LDL, TRIG, CHOLHDLRATIO    LIVER ENZYME RESULTS:  Lab Results   Component Value Date    AST 80 (H) 01/28/2021     (H) 01/28/2021       CBC RESULTS:  Lab Results   Component Value Date    WBC 3.8 (L) 01/27/2021    RBC 4.41 01/27/2021    HGB 15.0 01/27/2021    HCT 45.3 01/27/2021     (H) 01/27/2021    MCH 34.0 (H) 01/27/2021    MCHC 33.1 01/27/2021    RDW 15.7 (H) 01/27/2021     01/27/2021       BMP RESULTS:  Lab Results   Component Value Date     03/03/2021    POTASSIUM 4.1 03/03/2021    CHLORIDE 109 03/03/2021    CO2 28 03/03/2021    ANIONGAP 4 03/03/2021    GLC 97 03/03/2021    BUN 13 03/03/2021    CR 1.28 (H) 03/03/2021    GFRESTIMATED 63 03/03/2021    GFRESTBLACK 73 03/03/2021    BRAD 9.4 03/03/2021        A1C RESULTS:  No results found for: A1C    INR RESULTS:  Lab Results   Component Value Date    INR 1.02 01/25/2021    INR 1.23 (H) 01/21/2021          PROCEDURES & FURTHER ASSESSMENTS:     ECG dated 1/21/2021  Atrial fibrillation RVR.    Echocardiogram today: Pending.           CLINICAL IMPRESSION:     Cordell Adams  is a very pleasant 55 year old male with severe mitral regurgitation referred to our clinic for evaluation and consideration for potential transcatheter mitral valve repair with a Mitraclip. He has a history of nonischemic cardiomyopathy secondary to alcohol abuse EF 10-20%, a-fib on amiodarone and Xarelto, PE (Nov 2019) CAD s/p LAD PCI 2019 w/recent cath 1/2021 w/patent LAD stents and severe mitral regurgitation secondary to flail P1 posterior leaflet.   His LVEF is recovered per today's echocardiogram.  Mitral regurgitation severity is somewhat moderate to severe which is less severe than the last echocardiogram.  Now he is a good candidate for surgical intervention to mitral regurgitation.  We would like to wait a few months for full  recovery of LVEF and then we would perform MENDEL to assess mitral regurgitation again about candidacy of valvuloplasty or mitral clip.    -Severe mitral regurgitation P1 frail  Recovered LVEF, alcoholic cardiomyopathy  Atrial fibrillation on rivaroxaban  History of coronary artery disease PCI to LAD in 2019, patent stent in 01/2021     Plan Summary:  -MENDEL in 2 to 3 months and consider cardiovascular surgery consultation      Patient was evaluated in clinic with Dr. Aranda.    Frank Aranda MD  Interventional Cardiology Fellow [id number]  [facility] Cardiology Team    Greater than 60 minutes were spent with patient and family providing education regarding progression of aortic stenosis, symptom recognition and management as well as approach to treatment and post-procedural expectations.     CC  Patient Care Team:  Becca Morton MD as PCP - General (Family Medicine)  Johnny Gonzales APRN CNP as Assigned PCP  Susana Workman RN as Specialty Care Coordinator (Cardiology)  Leny Wade MD as Assigned Heart and Vascular Provider    Patient seen and examined with Cardiovascular fellow and agree with the assessment and plan described above.     Frank Aranda M.D.  Interventional Cardiology  [facility] HEART CARE  CARDIOVASCULAR DIVISION    VALVE CLINIC CONSULTATION    PRIMARY CARDIOLOGIST: Dr. Wade      PERTINENT CLINICAL HISTORY & REASON FOR CONSULTATION:     Cordell Adams . is a very pleasant 55 year old male with severe mitral regurgitation referred to our clinic for evaluation and consideration for potential transcatheter mitral valve repair with a Mitraclip. He has a history of nonischemic cardiomyopathy secondary to alcohol abuse EF 10-20%, a-fib on amiodarone and Xarelto, PE (Nov 2019) CAD s/p LAD PCI 2019 w/recent cath 1/2021 w/patent LAD stents and severe mitral regurgitation secondary to flail P1 posterior leaflet.     Mr. Adams  was transferred from [facility] on [facility]21 for management of newly diagnosed cardiomyopathy (LVEF ~15-20%) and surgical consideration of known severe mitral regurgitation. He was diuresed and started on goal directed medical therapy. He underwent coronary angiogram showing patent LAD stent and RHC showing mildly elevated filling pressures. He was seen by CV surgery and was thought to be high risk for surgery given reduced LVEF. It was recommended that he seek chem dependency for alcohol abuse and continue GDMT with plans to repeat prior to deciding surgery vs clip.   The patient discharged home and he does not drink alcohol since then.   The patient states that he has been very well without much symptoms.  [place] heart association class II symptoms.  No chest pain.  No shortness of breath or orthopnea.  No leg edema.  The patient is here for follow-up appointment about mitral regurgitation.              PAST MEDICAL HISTORY:   No past medical history on file.     PAST SURGICAL HISTORY:     Past Surgical History:   Procedure Laterality Date     CV CORONARY ANGIOGRAM N/A 1/25/2021    Procedure: Coronary Angiogram;  Surgeon: Cresencio Becker MD;  Location:  HEART CARDIAC CATH LAB     CV RIGHT HEART CATH MEASUREMENTS RECORDED N/A 1/25/2021    Procedure: Right Heart Cath;  Surgeon: Cresencio Becker MD;  Location:  HEART CARDIAC CATH LAB        CURRENT MEDICATIONS:     Current Outpatient Medications   Medication Sig Dispense Refill     acetaminophen (TYLENOL) 325 MG tablet Take 2 tablets (650 mg) by mouth every 4 hours as needed for mild pain 30 tablet 0     amiodarone (PACERONE) 200 MG tablet Take 1 tablet (200 mg) by mouth 2 times daily 180 tablet 3     aspirin (ASA) 81 MG chewable tablet Take 1 tablet (81 mg) by mouth daily 90 tablet 3     atorvastatin (LIPITOR) 40 MG tablet Take 1 tablet (40 mg) by mouth every evening 90 tablet 3     folic acid (FOLVITE) 1 MG tablet Take 1 tablet (1  mg) by mouth daily 30 tablet 0     furosemide (LASIX) 20 MG tablet Take 1 tablet (20 mg) by mouth daily 90 tablet 3     lisinopril (ZESTRIL) 5 MG tablet Take 1 tablet (5 mg) by mouth daily 90 tablet 3     metoprolol succinate ER (TOPROL-XL) 50 MG 24 hr tablet Take 1 tablet (50 mg) by mouth daily 90 tablet 3     polyethylene glycol (MIRALAX) 17 GM/Dose powder Take 17 g by mouth daily 510 g 0     rivaroxaban ANTICOAGULANT (XARELTO) 20 MG TABS tablet Take 1 tablet (20 mg) by mouth daily (with dinner) 90 tablet 3        ALLERGIES:   No Known Allergies     FAMILY HISTORY:   No family history on file.     SOCIAL HISTORY:     Social History     Socioeconomic History     Marital status: Single     Spouse name: Not on file     Number of children: Not on file     Years of education: Not on file     Highest education level: Not on file   Occupational History     Not on file   Social Needs     Financial resource strain: Not on file     Food insecurity     Worry: Not on file     Inability: Not on file     Transportation needs     Medical: Not on file     Non-medical: Not on file   Tobacco Use     Smoking status: Never Smoker     Smokeless tobacco: Never Used   Substance and Sexual Activity     Alcohol use: Not on file     Drug use: Not on file     Sexual activity: Not on file   Lifestyle     Physical activity     Days per week: Not on file     Minutes per session: Not on file     Stress: Not on file   Relationships     Social connections     Talks on phone: Not on file     Gets together: Not on file     Attends Nondenominational service: Not on file     Active member of club or organization: Not on file     Attends meetings of clubs or organizations: Not on file     Relationship status: Not on file     Intimate partner violence     Fear of current or ex partner: Not on file     Emotionally abused: Not on file     Physically abused: Not on file     Forced sexual activity: Not on file   Other Topics Concern     Not on file   Social  "History Narrative     Not on file        REVIEW OF SYSTEMS:     Constitutional: No fevers or chills  Skin: No new rash or itching  Eyes: No acute change in vision  Ears/Nose/Throat: No purulent rhinorrhea, new hearing loss, or new vertigo  Respiratory: No cough or hemoptysis  Cardiovascular: See HPI  Gastrointestinal: No change in appetite, vomiting, hematemesis or diarrhea  Genitourinary: No dysuria or hematuria  Musculoskeletal: No new back pain, neck pain or muscle pain  Neurologic: No new headaches, focal weakness or behavior changes  Psychiatric: No hallucinations, excessive alcohol consumption or illegal drug usage  Hematologic/Lymphatic/Immunologic: No bleeding, chills, fever, night sweats or weight loss  Endocrine: No new cold intolerance, heat intolerance, polyphagia, polydipsia or polyuria      PHYSICAL EXAMINATION:     /82 (BP Location: Right arm, Patient Position: Chair, Cuff Size: Adult Regular)   Pulse 76   Ht 1.715 m (5' 7.5\")   Wt 77.6 kg (171 lb)   SpO2 95%   BMI 26.39 kg/m      GENERAL: No acute distress.  HEENT: EOMI. Sclerae white, not injected. Nares clear. Pharynx without erythema or exudate.   Neck: No adenopathy. No thyromegaly. No jugular venous distension.   Heart: Regular rate and rhythm.3/6 systolic murmur.  Lungs: Clear to auscultation. No ronchi, wheezes, rales.   Abdomen: Soft, nontender, nondistended. Bowel sounds present.  Extremities: No clubbing, cyanosis, or edema.   Neurologic: Alert and oriented to person/place/time, normal speech and affect. No focal deficits.  Skin: No petechiae, purpura or rash.     LABORATORY DATA:     LIPID RESULTS:  No results found for: CHOL, HDL, LDL, TRIG, CHOLHDLRATIO    LIVER ENZYME RESULTS:  Lab Results   Component Value Date    AST 80 (H) 01/28/2021     (H) 01/28/2021       CBC RESULTS:  Lab Results   Component Value Date    WBC 3.8 (L) 01/27/2021    RBC 4.41 01/27/2021    HGB 15.0 01/27/2021    HCT 45.3 01/27/2021     (H) " 01/27/2021    MCH 34.0 (H) 01/27/2021    MCHC 33.1 01/27/2021    RDW 15.7 (H) 01/27/2021     01/27/2021       BMP RESULTS:  Lab Results   Component Value Date     03/03/2021    POTASSIUM 4.1 03/03/2021    CHLORIDE 109 03/03/2021    CO2 28 03/03/2021    ANIONGAP 4 03/03/2021    GLC 97 03/03/2021    BUN 13 03/03/2021    CR 1.28 (H) 03/03/2021    GFRESTIMATED 63 03/03/2021    GFRESTBLACK 73 03/03/2021    BRAD 9.4 03/03/2021        A1C RESULTS:  No results found for: A1C    INR RESULTS:  Lab Results   Component Value Date    INR 1.02 01/25/2021    INR 1.23 (H) 01/21/2021          PROCEDURES & FURTHER ASSESSMENTS:     ECG dated 1/21/2021  Atrial fibrillation RVR.    Echocardiogram today: Pending.           CLINICAL IMPRESSION:     Cordell Adams  is a very pleasant 55 year old male with severe mitral regurgitation referred to our clinic for evaluation and consideration for potential transcatheter mitral valve repair with a Mitraclip. He has a history of nonischemic cardiomyopathy secondary to alcohol abuse EF 10-20%, a-fib on amiodarone and Xarelto, PE (Nov 2019) CAD s/p LAD PCI 2019 w/recent cath 1/2021 w/patent LAD stents and severe mitral regurgitation secondary to flail P1 posterior leaflet.   His LVEF is recovered per today's echocardiogram.  Mitral regurgitation severity is somewhat moderate to severe which is less severe than the last echocardiogram.  Now he is a good candidate for surgical intervention to mitral regurgitation.  We would like to wait a few months for full recovery of LVEF and then we would perform MENDEL to assess mitral regurgitation again about candidacy of valvuloplasty or mitral clip.    -Severe mitral regurgitation P1 frail  Recovered LVEF, alcoholic cardiomyopathy  Atrial fibrillation on rivaroxaban  History of coronary artery disease PCI to LAD in 2019, patent stent in 01/2021     Plan Summary:  -MENDEL in 2 to 3 months and consider cardiovascular surgery  consultation      Patient was evaluated in clinic with Dr. Aranda.    Frank Aranda MD  Interventional Cardiology Fellow [id number]  [facility] Cardiology Team    Greater than 60 minutes were spent with patient and family providing education regarding progression of aortic stenosis, symptom recognition and management as well as approach to treatment and post-procedural expectations.     CC  Patient Care Team:  Becca Morton MD as PCP - General (Family Medicine)  Johnny Gonzales APRN CNP as Assigned PCP  Susana Workman RN as Specialty Care Coordinator (Cardiology)  Leny Wade MD as Assigned Heart and Vascular Provider       Patient seen and examined with Cardiovascular fellow and agree with the assessment and plan described above.     Frank Aranda M.D.  Interventional Cardiology  [facility]

## 2021-03-11 ENCOUNTER — PATIENT OUTREACH (OUTPATIENT)
Dept: CARDIOLOGY | Facility: CLINIC | Age: 56
End: 2021-03-11

## 2021-03-11 ENCOUNTER — TRANSCRIBE ORDERS (OUTPATIENT)
Dept: CARDIOLOGY | Facility: CLINIC | Age: 56
End: 2021-03-11

## 2021-03-11 DIAGNOSIS — I50.9 ACUTE DECOMPENSATED HEART FAILURE (H): Primary | ICD-10-CM

## 2021-03-11 LAB — PETH BLD-MCNC: NEGATIVE NG/ML

## 2021-03-11 NOTE — TELEPHONE ENCOUNTER
Attempted to call [facility] to follow up on Lisinopril dose, but voicemail is not set up.  Has followed up with [facility] since dose was changed and was recommended no changes to dose at that time.

## 2021-03-14 NOTE — PROGRESS NOTES
[facility] HEART [facility]  CARDIOVASCULAR DIVISION    VALVE CLINIC CONSULTATION    PRIMARY CARDIOLOGIST: Dr. Wade      PERTINENT CLINICAL HISTORY & REASON FOR CONSULTATION:     Cordell Adams Jr. is a very pleasant 55 year old male with severe mitral regurgitation referred to our clinic for evaluation and consideration for potential transcatheter mitral valve repair with a Mitraclip. He has a history of nonischemic cardiomyopathy secondary to alcohol abuse EF 10-20%, a-fib on amiodarone and Xarelto, PE (Nov 2019) CAD s/p LAD PCI 2019 w/recent cath 1/2021 w/patent LAD stents and severe mitral regurgitation secondary to flail P1 posterior leaflet.     Mr. Adams was transferred from [facility] on 1/21/2021 for management of newly diagnosed cardiomyopathy (LVEF ~15-20%) and surgical consideration of known severe mitral regurgitation. He was diuresed and started on goal directed medical therapy. He underwent coronary angiogram showing patent LAD stent and RHC showing mildly elevated filling pressures. He was seen by CV surgery and was thought to be high risk for surgery given reduced LVEF. It was recommended that he seek chem dependency for alcohol abuse and continue GDMT with plans to repeat prior to deciding surgery vs clip.   The patient discharged home and he does not drink alcohol since then.   The patient states that he has been very well without much symptoms.  [place] heart association class II symptoms.  No chest pain.  No shortness of breath or orthopnea.  No leg edema.  The patient is here for follow-up appointment about mitral regurgitation.              PAST MEDICAL HISTORY:   No past medical history on file.     PAST SURGICAL HISTORY:     Past Surgical History:   Procedure Laterality Date     CV CORONARY ANGIOGRAM N/A 1/25/2021    Procedure: Coronary Angiogram;  Surgeon: Cresencio Becker MD;  Location:  HEART CARDIAC CATH LAB     CV RIGHT HEART CATH MEASUREMENTS RECORDED  N/A 1/25/2021    Procedure: Right Heart Cath;  Surgeon: Cresencio Becker MD;  Location:  HEART CARDIAC CATH LAB        CURRENT MEDICATIONS:     Current Outpatient Medications   Medication Sig Dispense Refill     acetaminophen (TYLENOL) 325 MG tablet Take 2 tablets (650 mg) by mouth every 4 hours as needed for mild pain 30 tablet 0     amiodarone (PACERONE) 200 MG tablet Take 1 tablet (200 mg) by mouth 2 times daily 180 tablet 3     aspirin (ASA) 81 MG chewable tablet Take 1 tablet (81 mg) by mouth daily 90 tablet 3     atorvastatin (LIPITOR) 40 MG tablet Take 1 tablet (40 mg) by mouth every evening 90 tablet 3     folic acid (FOLVITE) 1 MG tablet Take 1 tablet (1 mg) by mouth daily 30 tablet 0     furosemide (LASIX) 20 MG tablet Take 1 tablet (20 mg) by mouth daily 90 tablet 3     lisinopril (ZESTRIL) 5 MG tablet Take 1 tablet (5 mg) by mouth daily 90 tablet 3     metoprolol succinate ER (TOPROL-XL) 50 MG 24 hr tablet Take 1 tablet (50 mg) by mouth daily 90 tablet 3     polyethylene glycol (MIRALAX) 17 GM/Dose powder Take 17 g by mouth daily 510 g 0     rivaroxaban ANTICOAGULANT (XARELTO) 20 MG TABS tablet Take 1 tablet (20 mg) by mouth daily (with dinner) 90 tablet 3        ALLERGIES:   No Known Allergies     FAMILY HISTORY:   No family history on file.     SOCIAL HISTORY:     Social History     Socioeconomic History     Marital status: Single     Spouse name: Not on file     Number of children: Not on file     Years of education: Not on file     Highest education level: Not on file   Occupational History     Not on file   Social Needs     Financial resource strain: Not on file     Food insecurity     Worry: Not on file     Inability: Not on file     Transportation needs     Medical: Not on file     Non-medical: Not on file   Tobacco Use     Smoking status: Never Smoker     Smokeless tobacco: Never Used   Substance and Sexual Activity     Alcohol use: Not on file     Drug use: Not on file     Sexual  "activity: Not on file   Lifestyle     Physical activity     Days per week: Not on file     Minutes per session: Not on file     Stress: Not on file   Relationships     Social connections     Talks on phone: Not on file     Gets together: Not on file     Attends Orthodoxy service: Not on file     Active member of club or organization: Not on file     Attends meetings of clubs or organizations: Not on file     Relationship status: Not on file     Intimate partner violence     Fear of current or ex partner: Not on file     Emotionally abused: Not on file     Physically abused: Not on file     Forced sexual activity: Not on file   Other Topics Concern     Not on file   Social History Narrative     Not on file        REVIEW OF SYSTEMS:     Constitutional: No fevers or chills  Skin: No new rash or itching  Eyes: No acute change in vision  Ears/Nose/Throat: No purulent rhinorrhea, new hearing loss, or new vertigo  Respiratory: No cough or hemoptysis  Cardiovascular: See HPI  Gastrointestinal: No change in appetite, vomiting, hematemesis or diarrhea  Genitourinary: No dysuria or hematuria  Musculoskeletal: No new back pain, neck pain or muscle pain  Neurologic: No new headaches, focal weakness or behavior changes  Psychiatric: No hallucinations, excessive alcohol consumption or illegal drug usage  Hematologic/Lymphatic/Immunologic: No bleeding, chills, fever, night sweats or weight loss  Endocrine: No new cold intolerance, heat intolerance, polyphagia, polydipsia or polyuria      PHYSICAL EXAMINATION:     /82 (BP Location: Right arm, Patient Position: Chair, Cuff Size: Adult Regular)   Pulse 76   Ht 1.715 m (5' 7.5\")   Wt 77.6 kg (171 lb)   SpO2 95%   BMI 26.39 kg/m      GENERAL: No acute distress.  HEENT: EOMI. Sclerae white, not injected. Nares clear. Pharynx without erythema or exudate.   Neck: No adenopathy. No thyromegaly. No jugular venous distension.   Heart: Regular rate and rhythm.3/6 systolic " murmur.  Lungs: Clear to auscultation. No ronchi, wheezes, rales.   Abdomen: Soft, nontender, nondistended. Bowel sounds present.  Extremities: No clubbing, cyanosis, or edema.   Neurologic: Alert and oriented to person/place/time, normal speech and affect. No focal deficits.  Skin: No petechiae, purpura or rash.     LABORATORY DATA:     LIPID RESULTS:  No results found for: CHOL, HDL, LDL, TRIG, CHOLHDLRATIO    LIVER ENZYME RESULTS:  Lab Results   Component Value Date    AST 80 (H) 01/28/2021     (H) 01/28/2021       CBC RESULTS:  Lab Results   Component Value Date    WBC 3.8 (L) 01/27/2021    RBC 4.41 01/27/2021    HGB 15.0 01/27/2021    HCT 45.3 01/27/2021     (H) 01/27/2021    MCH 34.0 (H) 01/27/2021    MCHC 33.1 01/27/2021    RDW 15.7 (H) 01/27/2021     01/27/2021       BMP RESULTS:  Lab Results   Component Value Date     03/03/2021    POTASSIUM 4.1 03/03/2021    CHLORIDE 109 03/03/2021    CO2 28 03/03/2021    ANIONGAP 4 03/03/2021    GLC 97 03/03/2021    BUN 13 03/03/2021    CR 1.28 (H) 03/03/2021    GFRESTIMATED 63 03/03/2021    GFRESTBLACK 73 03/03/2021    BRAD 9.4 03/03/2021        A1C RESULTS:  No results found for: A1C    INR RESULTS:  Lab Results   Component Value Date    INR 1.02 01/25/2021    INR 1.23 (H) 01/21/2021          PROCEDURES & FURTHER ASSESSMENTS:     ECG dated 1/21/2021  Atrial fibrillation RVR.    Echocardiogram today: Pending.           CLINICAL IMPRESSION:     Cordell Reyesnikole Moody is a very pleasant 55 year old male with severe mitral regurgitation referred to our clinic for evaluation and consideration for potential transcatheter mitral valve repair with a Mitraclip. He has a history of nonischemic cardiomyopathy secondary to alcohol abuse EF 10-20%, a-fib on amiodarone and Xarelto, PE (Nov 2019) CAD s/p LAD PCI 2019 w/recent cath 1/2021 w/patent LAD stents and severe mitral regurgitation secondary to flail P1 posterior leaflet.   His LVEF is recovered per  today's echocardiogram.  Mitral regurgitation severity is somewhat moderate to severe which is less severe than the last echocardiogram.  Now he is a good candidate for surgical intervention to mitral regurgitation.  We would like to wait a few months for full recovery of LVEF and then we would perform MENDEL to assess mitral regurgitation again about candidacy of valvuloplasty or mitral clip.    -Severe mitral regurgitation P1 frail  Recovered LVEF, alcoholic cardiomyopathy  Atrial fibrillation on rivaroxaban  History of coronary artery disease PCI to LAD in 2019, patent stent in 01/2021     Plan Summary:  -MENDEL in 2 to 3 months and consider cardiovascular surgery consultation      Patient was evaluated in clinic with Dr. Aranda.    Frank Aranda MD  Interventional Cardiology Fellow [id number]  [facility] Cardiology Team    Greater than 60 minutes were spent with patient and family providing education regarding progression of aortic stenosis, symptom recognition and management as well as approach to treatment and post-procedural expectations.     CC  Patient Care Team:  Becca Morton MD as PCP - General (Family Medicine)  Johnny Gonzalse APRN CNP as Assigned PCP  Susana Workman RN as Specialty Care Coordinator (Cardiology)  Leny Wade MD as Assigned Heart and Vascular Provider    Patient seen and examined with Cardiovascular fellow and agree with the assessment and plan described above.     Frank Aranda M.D.  Interventional Cardiology  [facility]

## 2021-03-14 NOTE — PROGRESS NOTES
[facility] HEART [facility]  CARDIOVASCULAR DIVISION    VALVE CLINIC CONSULTATION    PRIMARY CARDIOLOGIST: Dr. Wade      PERTINENT CLINICAL HISTORY & REASON FOR CONSULTATION:     Cordell Adams Jr. is a very pleasant 55 year old male with severe mitral regurgitation referred to our clinic for evaluation and consideration for potential transcatheter mitral valve repair with a Mitraclip. He has a history of nonischemic cardiomyopathy secondary to alcohol abuse EF 10-20%, a-fib on amiodarone and Xarelto, PE (Nov 2019) CAD s/p LAD PCI 2019 w/recent cath 1/2021 w/patent LAD stents and severe mitral regurgitation secondary to flail P1 posterior leaflet.     Mr. Adams was transferred from [place] on 1/21/2021 for management of newly diagnosed cardiomyopathy (LVEF ~15-20%) and surgical consideration of known severe mitral regurgitation. He was diuresed and started on goal directed medical therapy. He underwent coronary angiogram showing patent LAD stent and RHC showing mildly elevated filling pressures. He was seen by CV surgery and was thought to be high risk for surgery given reduced LVEF. It was recommended that he seek chem dependency for alcohol abuse and continue GDMT with plans to repeat prior to deciding surgery vs clip.   The patient discharged home and he does not drink alcohol since then.   The patient states that he has been very well without much symptoms.  [place] heart association class II symptoms.  No chest pain.  No shortness of breath or orthopnea.  No leg edema.  The patient is here for follow-up appointment about mitral regurgitation.              PAST MEDICAL HISTORY:   No past medical history on file.     PAST SURGICAL HISTORY:     Past Surgical History:   Procedure Laterality Date     CV CORONARY ANGIOGRAM N/A 1/25/2021    Procedure: Coronary Angiogram;  Surgeon: Cresencio Becker MD;  Location:  HEART CARDIAC CATH LAB     CV RIGHT HEART CATH MEASUREMENTS RECORDED  N/A 1/25/2021    Procedure: Right Heart Cath;  Surgeon: Cresencio Becker MD;  Location:  HEART CARDIAC CATH LAB        CURRENT MEDICATIONS:     Current Outpatient Medications   Medication Sig Dispense Refill     acetaminophen (TYLENOL) 325 MG tablet Take 2 tablets (650 mg) by mouth every 4 hours as needed for mild pain 30 tablet 0     amiodarone (PACERONE) 200 MG tablet Take 1 tablet (200 mg) by mouth 2 times daily 180 tablet 3     aspirin (ASA) 81 MG chewable tablet Take 1 tablet (81 mg) by mouth daily 90 tablet 3     atorvastatin (LIPITOR) 40 MG tablet Take 1 tablet (40 mg) by mouth every evening 90 tablet 3     folic acid (FOLVITE) 1 MG tablet Take 1 tablet (1 mg) by mouth daily 30 tablet 0     furosemide (LASIX) 20 MG tablet Take 1 tablet (20 mg) by mouth daily 90 tablet 3     lisinopril (ZESTRIL) 5 MG tablet Take 1 tablet (5 mg) by mouth daily 90 tablet 3     metoprolol succinate ER (TOPROL-XL) 50 MG 24 hr tablet Take 1 tablet (50 mg) by mouth daily 90 tablet 3     polyethylene glycol (MIRALAX) 17 GM/Dose powder Take 17 g by mouth daily 510 g 0     rivaroxaban ANTICOAGULANT (XARELTO) 20 MG TABS tablet Take 1 tablet (20 mg) by mouth daily (with dinner) 90 tablet 3        ALLERGIES:   No Known Allergies     FAMILY HISTORY:   No family history on file.     SOCIAL HISTORY:     Social History     Socioeconomic History     Marital status: Single     Spouse name: Not on file     Number of children: Not on file     Years of education: Not on file     Highest education level: Not on file   Occupational History     Not on file   Social Needs     Financial resource strain: Not on file     Food insecurity     Worry: Not on file     Inability: Not on file     Transportation needs     Medical: Not on file     Non-medical: Not on file   Tobacco Use     Smoking status: Never Smoker     Smokeless tobacco: Never Used   Substance and Sexual Activity     Alcohol use: Not on file     Drug use: Not on file     Sexual  "activity: Not on file   Lifestyle     Physical activity     Days per week: Not on file     Minutes per session: Not on file     Stress: Not on file   Relationships     Social connections     Talks on phone: Not on file     Gets together: Not on file     Attends Anglican service: Not on file     Active member of club or organization: Not on file     Attends meetings of clubs or organizations: Not on file     Relationship status: Not on file     Intimate partner violence     Fear of current or ex partner: Not on file     Emotionally abused: Not on file     Physically abused: Not on file     Forced sexual activity: Not on file   Other Topics Concern     Not on file   Social History Narrative     Not on file        REVIEW OF SYSTEMS:     Constitutional: No fevers or chills  Skin: No new rash or itching  Eyes: No acute change in vision  Ears/Nose/Throat: No purulent rhinorrhea, new hearing loss, or new vertigo  Respiratory: No cough or hemoptysis  Cardiovascular: See HPI  Gastrointestinal: No change in appetite, vomiting, hematemesis or diarrhea  Genitourinary: No dysuria or hematuria  Musculoskeletal: No new back pain, neck pain or muscle pain  Neurologic: No new headaches, focal weakness or behavior changes  Psychiatric: No hallucinations, excessive alcohol consumption or illegal drug usage  Hematologic/Lymphatic/Immunologic: No bleeding, chills, fever, night sweats or weight loss  Endocrine: No new cold intolerance, heat intolerance, polyphagia, polydipsia or polyuria      PHYSICAL EXAMINATION:     /82 (BP Location: Right arm, Patient Position: Chair, Cuff Size: Adult Regular)   Pulse 76   Ht 1.715 m (5' 7.5\")   Wt 77.6 kg (171 lb)   SpO2 95%   BMI 26.39 kg/m      GENERAL: No acute distress.  HEENT: EOMI. Sclerae white, not injected. Nares clear. Pharynx without erythema or exudate.   Neck: No adenopathy. No thyromegaly. No jugular venous distension.   Heart: Regular rate and rhythm.3/6 systolic " murmur.  Lungs: Clear to auscultation. No ronchi, wheezes, rales.   Abdomen: Soft, nontender, nondistended. Bowel sounds present.  Extremities: No clubbing, cyanosis, or edema.   Neurologic: Alert and oriented to person/place/time, normal speech and affect. No focal deficits.  Skin: No petechiae, purpura or rash.     LABORATORY DATA:     LIPID RESULTS:  No results found for: CHOL, HDL, LDL, TRIG, CHOLHDLRATIO    LIVER ENZYME RESULTS:  Lab Results   Component Value Date    AST 80 (H) 01/28/2021     (H) 01/28/2021       CBC RESULTS:  Lab Results   Component Value Date    WBC 3.8 (L) 01/27/2021    RBC 4.41 01/27/2021    HGB 15.0 01/27/2021    HCT 45.3 01/27/2021     (H) 01/27/2021    MCH 34.0 (H) 01/27/2021    MCHC 33.1 01/27/2021    RDW 15.7 (H) 01/27/2021     01/27/2021       BMP RESULTS:  Lab Results   Component Value Date     03/03/2021    POTASSIUM 4.1 03/03/2021    CHLORIDE 109 03/03/2021    CO2 28 03/03/2021    ANIONGAP 4 03/03/2021    GLC 97 03/03/2021    BUN 13 03/03/2021    CR 1.28 (H) 03/03/2021    GFRESTIMATED 63 03/03/2021    GFRESTBLACK 73 03/03/2021    BRAD 9.4 03/03/2021        A1C RESULTS:  No results found for: A1C    INR RESULTS:  Lab Results   Component Value Date    INR 1.02 01/25/2021    INR 1.23 (H) 01/21/2021          PROCEDURES & FURTHER ASSESSMENTS:     ECG dated 1/21/2021  Atrial fibrillation RVR.    Echocardiogram today: Pending.           CLINICAL IMPRESSION:     Cordell Reyesnikole Moody is a very pleasant 55 year old male with severe mitral regurgitation referred to our clinic for evaluation and consideration for potential transcatheter mitral valve repair with a Mitraclip. He has a history of nonischemic cardiomyopathy secondary to alcohol abuse EF 10-20%, a-fib on amiodarone and Xarelto, PE (Nov 2019) CAD s/p LAD PCI 2019 w/recent cath 1/2021 w/patent LAD stents and severe mitral regurgitation secondary to flail P1 posterior leaflet.   His LVEF is recovered per  today's echocardiogram.  Mitral regurgitation severity is somewhat moderate to severe which is less severe than the last echocardiogram.  Now he is a good candidate for surgical intervention to mitral regurgitation.  We would like to wait a few months for full recovery of LVEF and then we would perform MENDEL to assess mitral regurgitation again about candidacy of valvuloplasty or mitral clip.    -Severe mitral regurgitation P1 frail  Recovered LVEF, alcoholic cardiomyopathy  Atrial fibrillation on rivaroxaban  History of coronary artery disease PCI to LAD in 2019, patent stent in 01/2021     Plan Summary:  -MENDEL in 2 to 3 months and consider cardiovascular surgery consultation      Patient was evaluated in clinic with Dr. Aranda.    Frank Aranda MD  Interventional Cardiology Fellow [id number]  [facility] Cardiology Team    Greater than 60 minutes were spent with patient and family providing education regarding progression of aortic stenosis, symptom recognition and management as well as approach to treatment and post-procedural expectations.     CC  Patient Care Team:  Becca Morton MD as PCP - General (Family Medicine)  Johnny Gonzales APRN CNP as Assigned PCP  Susana Workman RN as Specialty Care Coordinator (Cardiology)  Leny Wade MD as Assigned Heart and Vascular Provider       Patient seen and examined with Cardiovascular fellow and agree with the assessment and plan described above.     Frank Aranda M.D.  Interventional Cardiology  [facility]

## 2021-03-17 ENCOUNTER — OFFICE VISIT (OUTPATIENT)
Dept: CARDIOLOGY | Facility: CLINIC | Age: 56
End: 2021-03-17
Payer: COMMERCIAL

## 2021-03-17 VITALS
BODY MASS INDEX: 27.05 KG/M2 | DIASTOLIC BLOOD PRESSURE: 83 MMHG | SYSTOLIC BLOOD PRESSURE: 126 MMHG | OXYGEN SATURATION: 62 % | WEIGHT: 175.3 LBS | HEART RATE: 100 BPM

## 2021-03-17 DIAGNOSIS — K70.10 ALCOHOLIC HEPATITIS, UNSPECIFIED WHETHER ASCITES PRESENT (H): ICD-10-CM

## 2021-03-17 DIAGNOSIS — I48.91 ATRIAL FIBRILLATION WITH RVR (H): ICD-10-CM

## 2021-03-17 DIAGNOSIS — I50.9 ACUTE DECOMPENSATED HEART FAILURE (H): ICD-10-CM

## 2021-03-17 DIAGNOSIS — I34.0 NONRHEUMATIC MITRAL VALVE REGURGITATION: ICD-10-CM

## 2021-03-17 DIAGNOSIS — I50.9 ACUTE DECOMPENSATED HEART FAILURE (H): Primary | ICD-10-CM

## 2021-03-17 DIAGNOSIS — I25.5 ISCHEMIC CARDIOMYOPATHY: ICD-10-CM

## 2021-03-17 LAB
ANION GAP SERPL CALCULATED.3IONS-SCNC: 5 MMOL/L (ref 3–14)
BUN SERPL-MCNC: 12 MG/DL (ref 7–30)
CALCIUM SERPL-MCNC: 9.4 MG/DL (ref 8.5–10.1)
CHLORIDE SERPL-SCNC: 107 MMOL/L (ref 94–109)
CO2 SERPL-SCNC: 27 MMOL/L (ref 20–32)
CREAT SERPL-MCNC: 1.25 MG/DL (ref 0.66–1.25)
GFR SERPL CREATININE-BSD FRML MDRD: 65 ML/MIN/{1.73_M2}
GLUCOSE SERPL-MCNC: 138 MG/DL (ref 70–99)
POTASSIUM SERPL-SCNC: 4.1 MMOL/L (ref 3.4–5.3)
SODIUM SERPL-SCNC: 139 MMOL/L (ref 133–144)

## 2021-03-17 PROCEDURE — 99214 OFFICE O/P EST MOD 30 MIN: CPT | Performed by: NURSE PRACTITIONER

## 2021-03-17 PROCEDURE — 80048 BASIC METABOLIC PNL TOTAL CA: CPT | Performed by: NURSE PRACTITIONER

## 2021-03-17 PROCEDURE — 36415 COLL VENOUS BLD VENIPUNCTURE: CPT | Performed by: NURSE PRACTITIONER

## 2021-03-17 RX ORDER — METOPROLOL SUCCINATE 25 MG/1
75 TABLET, EXTENDED RELEASE ORAL DAILY
Qty: 90 TABLET | Refills: 3 | Status: SHIPPED | OUTPATIENT
Start: 2021-03-17 | End: 2021-11-24

## 2021-03-17 ASSESSMENT — PAIN SCALES - GENERAL: PAINLEVEL: NO PAIN (0)

## 2021-03-17 NOTE — PROGRESS NOTES
HPI: Cordell is a 55 year old White male with a past medical history of severe MR 2/2 flail P1 posterior mitral leaflet, CAD (s/p PCI to pLAD in 2019), pAF on Eliquis pta, alcohol use disorder c/b withdrawal seizures, and PE (Nov 2019) who was transferred from [facility] [place] for management of newly diagnosed cardiomyopathy (LVEF ~15-20%) and surgical consideration of known severe mitral regurgitation    Inpatient from 1/21-1/28- acute decompensated HFrEF in the setting of afib with RVR, alcohol intoxication and volume overload. An echocardiogram was obtained and showed severely reduced LV function (EF 15-20%) w/ severe diffuse hypokinesis, mild RVSD w/ mild dilation, and severe MR w/ a flail posterior mitral leaflet (p2 scallop). Euvolemia was achieved and has been maintained with 20mg PO lasix daily. Discharge weight is 167#( not sure about EDW) .  He felt really dried out after the hospitalization.   Patient states he has no SOB /ARGUELLO . Patient has no swelling in the legs/ abdomen. Patient has a scale at home and weighs himself  Patient prepares his own meals. His fiance watches what he eats. He has been staying within the 64 oz of fluid he feels.  He has been sober since hospitalization. Weight today is 173 lbs . Appetite is good. No SOB, No swelling    Denies , ARGUELLO, PND, orthopnea, chest pain, palpitations, lightheadedness, dizziness, near syncopal/syncopal episodes. Cordell has been following salt and fluid restrictions.          PAST MEDICAL HISTORY:  No past medical history on file.    FAMILY HISTORY:  No family history on file.    SOCIAL HISTORY:  Social History     Tobacco Use     Smoking status: Never Smoker     Smokeless tobacco: Never Used   Substance Use Topics     Alcohol use: None     Drug use: None           CURRENT MEDICATIONS:  Current Outpatient Medications   Medication Sig Dispense Refill     acetaminophen (TYLENOL) 325 MG tablet Take 2 tablets (650 mg) by mouth every 4 hours  as needed for mild pain 30 tablet 0     amiodarone (PACERONE) 200 MG tablet Take 1 tablet (200 mg) by mouth 2 times daily 180 tablet 3     aspirin (ASA) 81 MG chewable tablet Take 1 tablet (81 mg) by mouth daily 90 tablet 3     atorvastatin (LIPITOR) 40 MG tablet Take 1 tablet (40 mg) by mouth every evening 90 tablet 3     folic acid (FOLVITE) 1 MG tablet Take 1 tablet (1 mg) by mouth daily 30 tablet 0     furosemide (LASIX) 20 MG tablet Take 1 tablet (20 mg) by mouth daily 90 tablet 3     lisinopril (ZESTRIL) 5 MG tablet Take 1 tablet (5 mg) by mouth daily 90 tablet 3     metoprolol succinate ER (TOPROL-XL) 50 MG 24 hr tablet Take 1 tablet (50 mg) by mouth daily 90 tablet 3     polyethylene glycol (MIRALAX) 17 GM/Dose powder Take 17 g by mouth daily 510 g 0     rivaroxaban ANTICOAGULANT (XARELTO) 20 MG TABS tablet Take 1 tablet (20 mg) by mouth daily (with dinner) 90 tablet 3       ROS:  Review Of Systems  Skin: negative  Eyes: negative  Ears/Nose/Throat: negative  Respiratory: No shortness of breath, dyspnea on exertion, cough, or hemoptysis  Cardiovascular: negative  Gastrointestinal: negative  Genitourinary: negative  Musculoskeletal: negative  Neurologic: negative  Psychiatric: negative  Hematologic/Lymphatic/Immunologic: negative  Endocrine: negative      EXAM:  /83 (BP Location: Left arm, Patient Position: Sitting, Cuff Size: Adult Regular)   Pulse 100   Wt 79.5 kg (175 lb 4.8 oz)   SpO2 (!) 62%   BMI 27.05 kg/m    Home weight:  General: alert, articulate, and in no acute distress.  HEENT: normocephalic, atraumatic, anicteric sclera, EOMI, mucosa moist, no cyanosis.   Neck: neck supple.  No adenopathy, masses, or carotid bruits.  JVP not detected at 45 degrees  Heart: regular rhythm, normal S1/S2, no murmur, gallop, rub.  Precordium quiet with normal PMI.     Lungs: clear, no rales, ronchi, or wheezing.  No accessory muscle use, respirations unlabored.   Abdomen: soft, non-tender, bowel sounds  present, no hepatomegaly  Extremities:no edema.   No cyanosis.    Neurological: alert and oriented x 3.  normal speech and affect, no gross motor deficits  Skin:  No ecchymoses, rashes, or clubbing.    Labs:  CBC RESULTS:  Lab Results   Component Value Date    WBC 3.8 (L) 01/27/2021    RBC 4.41 01/27/2021    HGB 15.0 01/27/2021    HCT 45.3 01/27/2021     (H) 01/27/2021    MCH 34.0 (H) 01/27/2021    MCHC 33.1 01/27/2021    RDW 15.7 (H) 01/27/2021     01/27/2021       CMP RESULTS:  Lab Results   Component Value Date     03/17/2021    POTASSIUM 4.1 03/17/2021    CHLORIDE 107 03/17/2021    CO2 27 03/17/2021    ANIONGAP 5 03/17/2021     (H) 03/17/2021    BUN 12 03/17/2021    CR 1.25 03/17/2021    GFRESTIMATED 65 03/17/2021    GFRESTBLACK 76 03/17/2021    BRAD 9.4 03/17/2021    BILITOTAL 1.7 (H) 01/28/2021    ALBUMIN 2.8 (L) 01/28/2021    ALKPHOS 100 01/28/2021     (H) 01/28/2021    AST 80 (H) 01/28/2021        INR RESULTS:  Lab Results   Component Value Date    INR 1.02 01/25/2021       No components found for: CK  Lab Results   Component Value Date    MAG 2.1 01/26/2021     Lab Results   Component Value Date    NTBNP 838 (H) 02/18/2021     @BRIEFLABR (dig)@    Most recent echocardiogram:  No results found for this or any previous visit (from the past 8760 hour(s)).      Assessment and Plan:    In summary,Cordell  is a 55 year old male with history of severe MR 2/2 flail P1 posterior mitral leaflet, CAD (s/p PCI to pLAD in 2019), pAF on Eliquis pta, alcohol use disorder c/b withdrawal seizures, and PE (Nov 2019) who was transferred from [facility] on 1/21/2021 for management of newly diagnosed cardiomyopathy (LVEF ~15-20%) and surgical consideration of known severe mitral regurgitation.     Today is his follow up CORE visit. He appears to be euvolemic today and doing well.   1.  Chronic systolic heart failure secondary to ICM.  Stage C  NYHA Class III  ACEi/ARB: yes-  lisinopril 5 mg   BB: yes-Toprol 75 mg -   Aldosterone antagonist: deferred while other medical therapy is prioritized- future add -on  SCD prophylaxis: decision deferred during medication uptitration  Fluid status: euvolemic- furosemide 20 mg daily  Anticoagulation: xaralto  Antiplatelet:  ASA dose   Sleep apnea:not discussed  NSAID use:  Contraindicated.  Avoid use.  Remote Monitoring:none    2.  Other comordbid conditions: is    # Severe mitral regurgitation  2/2 flail posterior mitral leaflet.  During admission at the [facility], he was seen by interventional cardiology and structural cardiology. There is concern at this time that he would tolerate complete surgical open repair given markedly reduced LVEF- may have difficulty coming off bypass. Other option would be a mitral clip. - - They will do MENDEL in 2-3 months to reassess    # Paroxysmal Afib with RVR, currently relatively rate controlled (-110/min)  Rates at OSH 110s-180s recently, likely 2/2 alcohol withdrawal, decompensated heart failure and aspiration pneumonia.  Prior to hospitalization was prescribed Amiodarone 200mg, Metoprolol succinate 50mg [but patient was not taking]. Cardioverted once in 2018 in setting of etoh withdrawal.    #Alcoholic hepatitis-alcohol cessation counseling in the hospital. PCP to manage    # Alcohol use disorder  EtOH 168 mg/dL at OSH. Last drink was 1/16. Requiring PO diazepam at OSH for withdrawal. CD consult at OSH offered counseling and resource information. - still refraining from Alcohol - PCP to follow    3.  Follow-up   Metoprolol xl 75 mg daily  CORE appt 2-3 weeks         Johnny MURPHY, CNP  [facility]

## 2021-03-17 NOTE — PATIENT INSTRUCTIONS
Take your medicines every day, as directed    Changes made today:      o  Metoprolol xl 75 mg daily  o    Monitor Your Weight and Symptoms    Contact us if you:      Gain 2 pounds in one day or 5 pounds in one week    Feel more short of breath    Notice more leg swelling    Feel lightheadeded   Change your lifestyle    Limit Salt or Sodium:    2000 mg  Limit Fluids:    2000 mL or approximately 64 ounces  Eat a Heart Healthy Diet    Low in saturated fats  Stay Active:    Aim to move at least 150 minutes every  week         To Contact us    During Business Hours:  [phone], option # 1 (University)  Then option # 4 (medical questions)     After hours, weekends or holidays:   [phone], Option #4  Ask to speak to the On-Call Cardiologist. Inform them you are a CORE/heart failure patient at the [facility].     Use [email or web address] allows you to communicate directly with your heart team through secure messaging.    [email or web address] can be accessed any time on your phone, computer, or tablet.    If you need assistance, we'd be happy to help!         Keep your Heart Appointments:    CORE in 2-3 weeks

## 2021-03-17 NOTE — NURSING NOTE
Cordell Adams Jr.'s goals for this visit include:   Chief Complaint   Patient presents with     RECHECK     2 week follow up, labs prior       He requests these members of his care team be copied on today's visit information: no    PCP: Becca Morton    Referring Provider:  No referring provider defined for this encounter.    /83 (BP Location: Left arm, Patient Position: Sitting, Cuff Size: Adult Regular)   Pulse 100   Wt 79.5 kg (175 lb 4.8 oz)   SpO2 (!) 62%   BMI 27.05 kg/m      Do you need any medication refills at today's visit? No    Mercy Altamirano CMA......March 17, 2021     12:21 PM

## 2021-04-07 ENCOUNTER — OFFICE VISIT (OUTPATIENT)
Dept: CARDIOLOGY | Facility: CLINIC | Age: 56
End: 2021-04-07
Payer: COMMERCIAL

## 2021-04-07 VITALS
WEIGHT: 177.8 LBS | RESPIRATION RATE: 14 BRPM | SYSTOLIC BLOOD PRESSURE: 137 MMHG | TEMPERATURE: 97.9 F | OXYGEN SATURATION: 100 % | BODY MASS INDEX: 26.95 KG/M2 | HEIGHT: 68 IN | HEART RATE: 62 BPM | DIASTOLIC BLOOD PRESSURE: 86 MMHG

## 2021-04-07 DIAGNOSIS — I50.9 ACUTE DECOMPENSATED HEART FAILURE (H): ICD-10-CM

## 2021-04-07 DIAGNOSIS — F10.11 HISTORY OF ALCOHOL ABUSE: ICD-10-CM

## 2021-04-07 DIAGNOSIS — I50.22 CHRONIC SYSTOLIC HEART FAILURE (H): Primary | ICD-10-CM

## 2021-04-07 DIAGNOSIS — I25.5 ISCHEMIC CARDIOMYOPATHY: ICD-10-CM

## 2021-04-07 DIAGNOSIS — I48.91 ATRIAL FIBRILLATION WITH RVR (H): ICD-10-CM

## 2021-04-07 DIAGNOSIS — I34.0 NONRHEUMATIC MITRAL VALVE REGURGITATION: ICD-10-CM

## 2021-04-07 LAB
ANION GAP SERPL CALCULATED.3IONS-SCNC: 4 MMOL/L (ref 3–14)
BUN SERPL-MCNC: 15 MG/DL (ref 7–30)
CALCIUM SERPL-MCNC: 9.1 MG/DL (ref 8.5–10.1)
CHLORIDE SERPL-SCNC: 106 MMOL/L (ref 94–109)
CO2 SERPL-SCNC: 28 MMOL/L (ref 20–32)
CREAT SERPL-MCNC: 1.39 MG/DL (ref 0.66–1.25)
GFR SERPL CREATININE-BSD FRML MDRD: 57 ML/MIN/{1.73_M2}
GLUCOSE SERPL-MCNC: 109 MG/DL (ref 70–99)
POTASSIUM SERPL-SCNC: 4 MMOL/L (ref 3.4–5.3)
SODIUM SERPL-SCNC: 138 MMOL/L (ref 133–144)

## 2021-04-07 PROCEDURE — 80048 BASIC METABOLIC PNL TOTAL CA: CPT | Performed by: NURSE PRACTITIONER

## 2021-04-07 PROCEDURE — 99214 OFFICE O/P EST MOD 30 MIN: CPT | Performed by: NURSE PRACTITIONER

## 2021-04-07 PROCEDURE — 36415 COLL VENOUS BLD VENIPUNCTURE: CPT | Performed by: NURSE PRACTITIONER

## 2021-04-07 RX ORDER — AMIODARONE HYDROCHLORIDE 200 MG/1
200 TABLET ORAL 2 TIMES DAILY
Qty: 180 TABLET | Refills: 3 | Status: SHIPPED | OUTPATIENT
Start: 2021-04-07

## 2021-04-07 RX ORDER — ATORVASTATIN CALCIUM 40 MG/1
40 TABLET, FILM COATED ORAL EVERY EVENING
Qty: 90 TABLET | Refills: 3 | Status: SHIPPED | OUTPATIENT
Start: 2021-04-07

## 2021-04-07 ASSESSMENT — MIFFLIN-ST. JEOR: SCORE: 1618.06

## 2021-04-07 ASSESSMENT — PAIN SCALES - GENERAL: PAINLEVEL: NO PAIN (0)

## 2021-04-07 NOTE — PROGRESS NOTES
Cordell Adams 's goals for this visit include: Return  He requests these members of his care team be copied on today's visit information: PCP    PCP: Becca Morton    Referring Provider:  No referring provider defined for this encounter.      Do you need any medication refills at today's visit? AMANDA Lozada LPN  Pulmonary Medicine:  [facility]  Phone: [phone] Fax: [phone]    In person   HPI: Cordell is a 55 year old White male with a past medical history of severe MR 2/2 flail P1 posterior mitral leaflet, CAD (s/p PCI to pLAD in 2019), pAF on Eliquis pta, alcohol use disorder c/b withdrawal seizures, and PE (Nov 2019) who was transferred from [facility] on 1/21/2021 for management of newly diagnosed cardiomyopathy (LVEF ~15-20%) and surgical consideration of known severe mitral regurgitation    Inpatient from 1/21-1/28- acute decompensated HFrEF in the setting of afib with RVR, alcohol intoxication and volume overload. An echocardiogram was obtained and showed severely reduced LV function (EF 15-20%) w/ severe diffuse hypokinesis, mild RVSD w/ mild dilation, and severe MR w/ a flail posterior mitral leaflet (p2 scallop). Euvolemia was achieved and has been maintained with 20mg PO lasix daily. Discharge weight is 167#( not sure about EDW) .  He felt really dried out after the hospitalization.     Patient states he has no SOB /ARGUELLO . Patient has no swelling in the legs/ abdomen. Patient has a scale at home and weighs himself  Patient prepares his own meals. His fiance watches what he eats. He has been staying within the 64 oz of fluid he feels.  He has been sober since hospitalization. Weight today is 175 lbs . Appetite is good.    Denies , ARGUELLO, PND, orthopnea, chest pain, palpitations, lightheadedness, dizziness, near syncopal/syncopal episodes. Cordell has been following salt and fluid restrictions.          PAST MEDICAL HISTORY:  No past medical history  "on file.    FAMILY HISTORY:  No family history on file.    SOCIAL HISTORY:  Social History     Tobacco Use     Smoking status: Never Smoker     Smokeless tobacco: Never Used   Substance Use Topics     Alcohol use: None     Drug use: None           CURRENT MEDICATIONS:  Current Outpatient Medications   Medication Sig Dispense Refill     acetaminophen (TYLENOL) 325 MG tablet Take 2 tablets (650 mg) by mouth every 4 hours as needed for mild pain 30 tablet 0     amiodarone (PACERONE) 200 MG tablet Take 1 tablet (200 mg) by mouth 2 times daily 180 tablet 3     aspirin (ASA) 81 MG chewable tablet Take 1 tablet (81 mg) by mouth daily 90 tablet 3     atorvastatin (LIPITOR) 40 MG tablet Take 1 tablet (40 mg) by mouth every evening 90 tablet 3     folic acid (FOLVITE) 1 MG tablet Take 1 tablet (1 mg) by mouth daily 30 tablet 0     furosemide (LASIX) 20 MG tablet Take 1 tablet (20 mg) by mouth daily 90 tablet 3     lisinopril (ZESTRIL) 5 MG tablet Take 1 tablet (5 mg) by mouth daily 90 tablet 3     metoprolol succinate ER (TOPROL-XL) 25 MG 24 hr tablet Take 3 tablets (75 mg) by mouth daily 90 tablet 3     polyethylene glycol (MIRALAX) 17 GM/Dose powder Take 17 g by mouth daily 510 g 0     rivaroxaban ANTICOAGULANT (XARELTO) 20 MG TABS tablet Take 1 tablet (20 mg) by mouth daily (with dinner) 90 tablet 3       ROS:  Review Of Systems  Skin: negative  Eyes: negative  Ears/Nose/Throat: negative  Respiratory: No shortness of breath, dyspnea on exertion, cough, or hemoptysis  Cardiovascular: negative  Gastrointestinal: negative  Genitourinary: negative  Musculoskeletal: negative  Neurologic: negative  Psychiatric: negative  Hematologic/Lymphatic/Immunologic: negative  Endocrine: negative      EXAM:  /86   Pulse 62   Temp 97.9  F (36.6  C)   Resp 14   Ht 1.715 m (5' 7.5\")   Wt 80.6 kg (177 lb 12.8 oz)   SpO2 100%   BMI 27.44 kg/m    Home weight:  General: alert, articulate, and in no acute distress.  HEENT: " normocephalic, atraumatic, anicteric sclera, EOMI, mucosa moist, no cyanosis.   Neck: neck supple.  No adenopathy, masses, or carotid bruits.  JVP not detected at 45 degrees  Heart: regular rhythm, normal S1/S2, no murmur, gallop, rub.  Precordium quiet with normal PMI.     Lungs: clear, no rales, ronchi, or wheezing.  No accessory muscle use, respirations unlabored.   Abdomen: soft, non-tender, bowel sounds present, no hepatomegaly  Extremities:no edema.   No cyanosis.    Neurological: alert and oriented x 3.  normal speech and affect, no gross motor deficits  Skin:  No ecchymoses, rashes, or clubbing.    Labs:  CBC RESULTS:  Lab Results   Component Value Date    WBC 3.8 (L) 01/27/2021    RBC 4.41 01/27/2021    HGB 15.0 01/27/2021    HCT 45.3 01/27/2021     (H) 01/27/2021    MCH 34.0 (H) 01/27/2021    MCHC 33.1 01/27/2021    RDW 15.7 (H) 01/27/2021     01/27/2021       CMP RESULTS:  Lab Results   Component Value Date     04/07/2021    POTASSIUM 4.0 04/07/2021    CHLORIDE 106 04/07/2021    CO2 PENDING 04/07/2021    ANIONGAP PENDING 04/07/2021    GLC PENDING 04/07/2021    BUN PENDING 04/07/2021    CR PENDING 04/07/2021    GFRESTIMATED PENDING 04/07/2021    GFRESTBLACK PENDING 04/07/2021    BRAD PENDING 04/07/2021    BILITOTAL 1.7 (H) 01/28/2021    ALBUMIN 2.8 (L) 01/28/2021    ALKPHOS 100 01/28/2021     (H) 01/28/2021    AST 80 (H) 01/28/2021        INR RESULTS:  Lab Results   Component Value Date    INR 1.02 01/25/2021       No components found for: CK  Lab Results   Component Value Date    MAG 2.1 01/26/2021     Lab Results   Component Value Date    NTBNP 838 (H) 02/18/2021     @BRIEFLABR (dig)@    Most recent echocardiogram:  No results found for this or any previous visit (from the past 8760 hour(s)).      Assessment and Plan:    In summary,Cordell  is a 55 year old male with history of severe MR 2/2 flail P1 posterior mitral leaflet, CAD (s/p PCI to pLAD in 2019), pAF on  Eliquis pta, alcohol use disorder c/b withdrawal seizures, and PE (Nov 2019) who was transferred from [facility] on 1/21/2021 for management of newly diagnosed cardiomyopathy (LVEF ~15-20%) and surgical consideration of known severe mitral regurgitation.     Today is his follow up CORE visit. He appears to be hypovolemic today but appears to be doing well. We will decrease the Furosemide and reassess BMP .   1.  Chronic systolic heart failure secondary to ICM.  Stage C  NYHA Class III  ACEi/ARB: yes- lisinopril 5 mg   BB: yes-Toprol 75 mg -   Aldosterone antagonist: deferred while other medical therapy is prioritized- future add -on  SCD prophylaxis: decision deferred during medication uptitration  Fluid status: euvolemic- furosemide 20 mg daily  Anticoagulation: xaralto  Antiplatelet:  ASA dose   Sleep apnea:not discussed  NSAID use:  Contraindicated.  Avoid use.  Remote Monitoring:none    2.  Other comordbid conditions: is    # Severe mitral regurgitation  2/2 flail posterior mitral leaflet.  During admission at the [facility], he was seen by interventional cardiology and structural cardiology. There is concern at this time that he would tolerate complete surgical open repair given markedly reduced LVEF- may have difficulty coming off bypass. Other option would be a mitral clip. - - They will do MENDEL in 2-3 months to reassess    # Paroxysmal Afib with RVR, currently relatively rate controlled (-110/min)  Rates at OSH 110s-180s recently, likely 2/2 alcohol withdrawal, decompensated heart failure and aspiration pneumonia.  Prior to hospitalization was prescribed Amiodarone 200mg, Metoprolol succinate 50mg [but patient was not taking]. Cardioverted once in 2018 in setting of etoh withdrawal. Currently on 75 mg daily    #Alcoholic hepatitis-alcohol cessation counseling in the hospital. PCP to manage    # Alcohol use disorder   Last drink was 1/16. still refraining from Alcohol - PCP to follow    3.   Follow-up   Lasix 10 mg a day.   BMP in one week - if labs are within normal -increase Lisinopril   CORE appt 3 weeks       I reviewed patients pertinent clinical laboratory studies  I reviewed patients medications  I reviewed patients pertinent medical records      Johnny MURPHY CNP  [facility]

## 2021-04-07 NOTE — PATIENT INSTRUCTIONS
Take your medicines every day, as directed    Changes made today:  o Lasix 10 mg daily  o    Monitor Your Weight and Symptoms    Contact us if you:      Gain 2 pounds in one day or 5 pounds in one week    Feel more short of breath    Notice more leg swelling    Feel lightheadeded   Change your lifestyle    Limit Salt or Sodium:    2000 mg  Limit Fluids:    2000 mL or approximately 64 ounces  Eat a Heart Healthy Diet    Low in saturated fats  Stay Active:    Aim to move at least 150 minutes every  week         To Contact us    During Business Hours:  [phone], option # 1 (University)  Then option # 4 (medical questions)     After hours, weekends or holidays:   [phone], Option #4  Ask to speak to the On-Call Cardiologist. Inform them you are a CORE/heart failure patient at the [facility].     Use [employer]PING allows you to communicate directly with your heart team through secure messaging.    [employer] can be accessed any time on your phone, computer, or tablet.    If you need assistance, we'd be happy to help!         Keep your Heart Appointments:    BMP in one week     CORE in 3 weeks

## 2021-04-14 ENCOUNTER — PATIENT OUTREACH (OUTPATIENT)
Dept: CARDIOLOGY | Facility: CLINIC | Age: 56
End: 2021-04-14

## 2021-04-14 DIAGNOSIS — I50.22 CHRONIC SYSTOLIC HEART FAILURE (H): ICD-10-CM

## 2021-04-14 LAB
ANION GAP SERPL CALCULATED.3IONS-SCNC: 2 MMOL/L (ref 3–14)
BUN SERPL-MCNC: 15 MG/DL (ref 7–30)
CALCIUM SERPL-MCNC: 9.3 MG/DL (ref 8.5–10.1)
CHLORIDE SERPL-SCNC: 109 MMOL/L (ref 94–109)
CO2 SERPL-SCNC: 28 MMOL/L (ref 20–32)
CREAT SERPL-MCNC: 1.19 MG/DL (ref 0.66–1.25)
GFR SERPL CREATININE-BSD FRML MDRD: 69 ML/MIN/{1.73_M2}
GLUCOSE SERPL-MCNC: 103 MG/DL (ref 70–99)
POTASSIUM SERPL-SCNC: 4.1 MMOL/L (ref 3.4–5.3)
SODIUM SERPL-SCNC: 139 MMOL/L (ref 133–144)

## 2021-04-14 PROCEDURE — 80048 BASIC METABOLIC PNL TOTAL CA: CPT | Performed by: NURSE PRACTITIONER

## 2021-04-14 PROCEDURE — 36415 COLL VENOUS BLD VENIPUNCTURE: CPT | Performed by: NURSE PRACTITIONER

## 2021-04-14 NOTE — TELEPHONE ENCOUNTER
Patient was seen in the CORE clinic on 4/7/21. The plan following the visit was Lasix 10 mg a day, BMP in one week - if labs are within normal -increase Lisinopril, CORE appt 3 weeks . At that visit the patient reported their weight to be 175 pounds.     Per Johnny patient should increase his Lisinopril to 5mg BID.       Most recent weights: stable 175 pounds    Patient denies SOB and swelling, states he is feeling good.     Other Symptoms/Concerns:None      Follow up Appointments: Patient will get lab on April 23.     Message will be sent to Johnny Gonzales CNP to review.

## 2021-04-15 RX ORDER — LISINOPRIL 5 MG/1
5 TABLET ORAL 2 TIMES DAILY
Qty: 180 TABLET | Refills: 3 | Status: SHIPPED | OUTPATIENT
Start: 2021-04-15 | End: 2021-05-05

## 2021-04-23 DIAGNOSIS — I50.22 CHRONIC SYSTOLIC HEART FAILURE (H): ICD-10-CM

## 2021-04-23 LAB
ANION GAP SERPL CALCULATED.3IONS-SCNC: 3 MMOL/L (ref 3–14)
BUN SERPL-MCNC: 11 MG/DL (ref 7–30)
CALCIUM SERPL-MCNC: 9.3 MG/DL (ref 8.5–10.1)
CHLORIDE SERPL-SCNC: 106 MMOL/L (ref 94–109)
CO2 SERPL-SCNC: 29 MMOL/L (ref 20–32)
CREAT SERPL-MCNC: 1.2 MG/DL (ref 0.66–1.25)
GFR SERPL CREATININE-BSD FRML MDRD: 68 ML/MIN/{1.73_M2}
GLUCOSE SERPL-MCNC: 98 MG/DL (ref 70–99)
POTASSIUM SERPL-SCNC: 3.9 MMOL/L (ref 3.4–5.3)
SODIUM SERPL-SCNC: 138 MMOL/L (ref 133–144)

## 2021-04-23 PROCEDURE — 36415 COLL VENOUS BLD VENIPUNCTURE: CPT | Performed by: NURSE PRACTITIONER

## 2021-04-23 PROCEDURE — 80048 BASIC METABOLIC PNL TOTAL CA: CPT | Performed by: NURSE PRACTITIONER

## 2021-04-26 ENCOUNTER — PATIENT OUTREACH (OUTPATIENT)
Dept: CARDIOLOGY | Facility: CLINIC | Age: 56
End: 2021-04-26

## 2021-04-26 NOTE — TELEPHONE ENCOUNTER
Notified patient his lab results were stable from Friday and he should continue on his medications. Patient agreeable to plan, no questions at this time.     Gerardo Gill RN   [facility]ty [facility] Care Coordinator  [facility]   Phone: [phone]  Fax: [phone]

## 2021-04-28 ENCOUNTER — OFFICE VISIT (OUTPATIENT)
Dept: CARDIOLOGY | Facility: CLINIC | Age: 56
End: 2021-04-28
Payer: COMMERCIAL

## 2021-04-28 VITALS
DIASTOLIC BLOOD PRESSURE: 89 MMHG | SYSTOLIC BLOOD PRESSURE: 133 MMHG | WEIGHT: 179.1 LBS | BODY MASS INDEX: 27.64 KG/M2 | HEART RATE: 60 BPM | OXYGEN SATURATION: 100 %

## 2021-04-28 DIAGNOSIS — I25.5 ISCHEMIC CARDIOMYOPATHY: ICD-10-CM

## 2021-04-28 DIAGNOSIS — I50.22 CHRONIC SYSTOLIC HEART FAILURE (H): ICD-10-CM

## 2021-04-28 DIAGNOSIS — I50.22 CHRONIC SYSTOLIC HEART FAILURE (H): Primary | ICD-10-CM

## 2021-04-28 DIAGNOSIS — I34.0 MITRAL VALVE INSUFFICIENCY, UNSPECIFIED ETIOLOGY: ICD-10-CM

## 2021-04-28 DIAGNOSIS — I48.91 ATRIAL FIBRILLATION WITH RVR (H): ICD-10-CM

## 2021-04-28 DIAGNOSIS — Z87.898 HISTORY OF ALCOHOL USE: ICD-10-CM

## 2021-04-28 LAB
ANION GAP SERPL CALCULATED.3IONS-SCNC: 6 MMOL/L (ref 3–14)
BUN SERPL-MCNC: 13 MG/DL (ref 7–30)
CALCIUM SERPL-MCNC: 8.7 MG/DL (ref 8.5–10.1)
CHLORIDE SERPL-SCNC: 107 MMOL/L (ref 94–109)
CO2 SERPL-SCNC: 27 MMOL/L (ref 20–32)
CREAT SERPL-MCNC: 1.32 MG/DL (ref 0.66–1.25)
GFR SERPL CREATININE-BSD FRML MDRD: 61 ML/MIN/{1.73_M2}
GLUCOSE SERPL-MCNC: 112 MG/DL (ref 70–99)
NT-PROBNP SERPL-MCNC: 70 PG/ML (ref 0–125)
POTASSIUM SERPL-SCNC: 3.8 MMOL/L (ref 3.4–5.3)
SODIUM SERPL-SCNC: 140 MMOL/L (ref 133–144)

## 2021-04-28 PROCEDURE — 83880 ASSAY OF NATRIURETIC PEPTIDE: CPT | Performed by: INTERNAL MEDICINE

## 2021-04-28 PROCEDURE — 99214 OFFICE O/P EST MOD 30 MIN: CPT | Performed by: NURSE PRACTITIONER

## 2021-04-28 PROCEDURE — 36415 COLL VENOUS BLD VENIPUNCTURE: CPT | Performed by: INTERNAL MEDICINE

## 2021-04-28 PROCEDURE — 80048 BASIC METABOLIC PNL TOTAL CA: CPT | Performed by: INTERNAL MEDICINE

## 2021-04-28 RX ORDER — METOPROLOL SUCCINATE 50 MG/1
50 TABLET, EXTENDED RELEASE ORAL DAILY
COMMUNITY
Start: 2021-02-23 | End: 2022-03-23

## 2021-04-28 ASSESSMENT — PAIN SCALES - GENERAL: PAINLEVEL: NO PAIN (0)

## 2021-04-28 NOTE — NURSING NOTE
Cordell Adams Jr.'s goals for this visit include:   Chief Complaint   Patient presents with     RECHECK       He requests these members of his care team be copied on today's visit information: no    PCP: Becca Morton    Referring Provider:  No referring provider defined for this encounter.    /89 (BP Location: Left arm, Patient Position: Sitting, Cuff Size: Adult Regular)   Pulse 60   Wt 81.2 kg (179 lb 1.6 oz)   SpO2 100%   BMI 27.64 kg/m      Do you need any medication refills at today's visit? No    Mercy Altamirano CMA......April 28, 2021     7:47 AM

## 2021-04-28 NOTE — PATIENT INSTRUCTIONS
Take your medicines every day, as directed    Changes made today:  o none  o    Monitor Your Weight and Symptoms    Contact us if you:      Gain 2 pounds in one day or 5 pounds in one week    Feel more short of breath    Notice more leg swelling    Feel lightheadeded   Change your lifestyle    Limit Salt or Sodium:    2000 mg  Limit Fluids:    2000 mL or approximately 64 ounces  Eat a Heart Healthy Diet    Low in saturated fats  Stay Active:    Aim to move at least 150 minutes every  week         To Contact us    During Business Hours:  [phone], option # 1 (University)  Then option # 4 (medical questions)     After hours, weekends or holidays:   [phone], Option #4  Ask to speak to the On-Call Cardiologist. Inform them you are a CORE/heart failure patient at the [facility].     Use [employer] allows you to communicate directly with your heart team through secure messaging.    [employer] can be accessed any time on your phone, computer, or tablet.    If you need assistance, we'd be happy to help!         Keep your Heart Appointments:    [facility] in one week     CORE 5/26- in person - labs again    To schedule your COVID shot call: [phone]

## 2021-04-28 NOTE — PROGRESS NOTES
In person   HPI: Cordell is a 55 year old White male with a past medical history of severe MR 2/2 flail P1 posterior mitral leaflet, CAD (s/p PCI to pLAD in 2019), pAF on Eliquis pta, alcohol use disorder c/b withdrawal seizures, and PE (Nov 2019) who was transferred from [facility] on 1/21/2021 for management of newly diagnosed cardiomyopathy (LVEF ~15-20%) and surgical consideration of known severe mitral regurgitation    Inpatient from 1/21-1/28- acute decompensated HFrEF in the setting of afib with RVR, alcohol intoxication and volume overload. An echocardiogram was obtained and showed severely reduced LV function (EF 15-20%) w/ severe diffuse hypokinesis, mild RVSD w/ mild dilation, and severe MR w/ a flail posterior mitral leaflet (p2 scallop). Euvolemia was achieved and has been maintained with 20mg PO lasix daily. Discharge weight is 167#( not sure about EDW) .  He felt really dried out after the hospitalization.     Patient states he has no SOB /ARGUELLO . Patient has no swelling in the legs/ abdomen. Patient has a scale at home and weighs himself  Patient prepares his own meals. His fiance watches what he eats. He has been staying within the 64 oz of fluid he feels.  He has been sober since hospitalization. Weight today is 177 lbs . Appetite is good.    Denies PND, orthopnea, chest pain, palpitations, lightheadedness, dizziness, near syncopal/syncopal episodes. Cordell has been following salt and fluid restrictions.        PAST MEDICAL HISTORY:  No past medical history on file.    FAMILY HISTORY:  No family history on file.    SOCIAL HISTORY:  Social History     Tobacco Use     Smoking status: Never Smoker     Smokeless tobacco: Never Used   Substance Use Topics     Alcohol use: None     Drug use: None           CURRENT MEDICATIONS:  Current Outpatient Medications   Medication Sig Dispense Refill     acetaminophen (TYLENOL) 325 MG tablet Take 2 tablets (650 mg) by mouth every 4 hours as needed  for mild pain 30 tablet 0     amiodarone (PACERONE) 200 MG tablet Take 1 tablet (200 mg) by mouth 2 times daily 180 tablet 3     aspirin (ASA) 81 MG chewable tablet Take 1 tablet (81 mg) by mouth daily 90 tablet 3     atorvastatin (LIPITOR) 40 MG tablet Take 1 tablet (40 mg) by mouth every evening 90 tablet 3     folic acid (FOLVITE) 1 MG tablet Take 1 tablet (1 mg) by mouth daily 30 tablet 0     furosemide (LASIX) 20 MG tablet Take 1 tablet (20 mg) by mouth daily (Patient taking differently: Take 20 mg by mouth daily Taking 10 mg a day) 90 tablet 3     lisinopril (ZESTRIL) 5 MG tablet Take 1 tablet (5 mg) by mouth 2 times daily 180 tablet 3     metoprolol succinate ER (TOPROL-XL) 25 MG 24 hr tablet Take 3 tablets (75 mg) by mouth daily (Patient taking differently: Take 25 mg by mouth daily Taking with 50 mg tablet. Total dose of 75 mg a day.) 90 tablet 3     metoprolol succinate ER (TOPROL-XL) 50 MG 24 hr tablet 50 mg daily Taking with 25 mg, total dose of 75 mg a day       rivaroxaban ANTICOAGULANT (XARELTO) 20 MG TABS tablet Take 1 tablet (20 mg) by mouth daily (with dinner) 90 tablet 3     polyethylene glycol (MIRALAX) 17 GM/Dose powder Take 17 g by mouth daily (Patient not taking: Reported on 4/28/2021) 510 g 0       ROS:  Review Of Systems  Skin: negative  Eyes: negative  Ears/Nose/Throat: negative  Respiratory: No shortness of breath, dyspnea on exertion, cough, or hemoptysis  Cardiovascular: negative  Gastrointestinal: negative  Genitourinary: negative  Musculoskeletal: negative  Neurologic: negative  Psychiatric: negative  Hematologic/Lymphatic/Immunologic: negative  Endocrine: negative      EXAM:  /89 (BP Location: Left arm, Patient Position: Sitting, Cuff Size: Adult Regular)   Pulse 60   Wt 81.2 kg (179 lb 1.6 oz)   SpO2 100%   BMI 27.64 kg/m    Home weight:  General: alert, articulate, and in no acute distress.  HEENT: normocephalic, atraumatic, anicteric sclera, EOMI, mucosa moist, no  cyanosis.   Neck: neck supple.  No adenopathy, masses, or carotid bruits.  JVP not detected at 45 degrees  Heart: regular rhythm, normal S1/S2, no murmur, gallop, rub.  Precordium quiet with normal PMI.     Lungs: clear, no rales, ronchi, or wheezing.  No accessory muscle use, respirations unlabored.   Abdomen: soft, non-tender, bowel sounds present, no hepatomegaly  Extremities:no edema.   No cyanosis.    Neurological: alert and oriented x 3.  normal speech and affect, no gross motor deficits  Skin:  No ecchymoses, rashes, or clubbing.    Labs:  CBC RESULTS:  Lab Results   Component Value Date    WBC 3.8 (L) 01/27/2021    RBC 4.41 01/27/2021    HGB 15.0 01/27/2021    HCT 45.3 01/27/2021     (H) 01/27/2021    MCH 34.0 (H) 01/27/2021    MCHC 33.1 01/27/2021    RDW 15.7 (H) 01/27/2021     01/27/2021       CMP RESULTS:  Lab Results   Component Value Date     04/28/2021    POTASSIUM 3.8 04/28/2021    CHLORIDE 107 04/28/2021    CO2 27 04/28/2021    ANIONGAP 6 04/28/2021     (H) 04/28/2021    BUN 13 04/28/2021    CR 1.32 (H) 04/28/2021    GFRESTIMATED 61 04/28/2021    GFRESTBLACK 71 04/28/2021    BRAD 8.7 04/28/2021    BILITOTAL 1.7 (H) 01/28/2021    ALBUMIN 2.8 (L) 01/28/2021    ALKPHOS 100 01/28/2021     (H) 01/28/2021    AST 80 (H) 01/28/2021        INR RESULTS:  Lab Results   Component Value Date    INR 1.02 01/25/2021       No components found for: CK  Lab Results   Component Value Date    MAG 2.1 01/26/2021     Lab Results   Component Value Date    NTBNP 70 04/28/2021     @BRIEFLABR (dig)@    Most recent echocardiogram:  No results found for this or any previous visit (from the past 8760 hour(s)).      Assessment and Plan:    In summary,Cordell  is a 55 year old male with history of severe MR 2/2 flail P1 posterior mitral leaflet, CAD (s/p PCI to pLAD in 2019), pAF on Eliquis pta, alcohol use disorder c/b withdrawal seizures, and PE (Nov 2019) who was transferred from [facility] on 1/21/2021 for management of newly diagnosed cardiomyopathy (LVEF ~15-20%) and surgical consideration of known severe mitral regurgitation.     Today is his follow up CORE visit. He appears to be euvolemic today and appears to be doing well. Will get labs next week to reassess Creatinine- if stable will increase Lisinopril.     1.  Chronic systolic heart failure secondary to ICM.  Stage C  NYHA Class III  ACEi/ARB: yes- lisinopril 5 mg BID  BB: yes-Toprol 75 mg - no further increase   Aldosterone antagonist: deferred while other medical therapy is prioritized- will add once creat is more stable.   SCD prophylaxis: decision deferred during medication uptitration  Fluid status: euvolemic- furosemide 10 mg daily  Anticoagulation: xaralto  Antiplatelet:  ASA dose   Sleep apnea:not discussed  NSAID use:  Contraindicated.  Avoid use.  Remote Monitoring:none    2.  Other comordbid conditions: is    # Severe mitral regurgitation  2/2 flail posterior mitral leaflet.  During admission at the [facility], he was seen by interventional cardiology and structural cardiology. There is concern at this time that he would tolerate complete surgical open repair given markedly reduced LVEF- may have difficulty coming off bypass. Other option would be a mitral clip. - - They will do MENDEL in 2-3 months to reassess    # Paroxysmal Afib with RVR, currently relatively rate controlled   Rates at OSH 110s-180s recently, likely 2/2 alcohol withdrawal, decompensated heart failure and aspiration pneumonia.  Prior to hospitalization was prescribed Amiodarone 200mg, Cardioverted once in 2018 in setting of etoh withdrawal. Currently on Metoprolol [id number] mg daily    #Alcoholic hepatitis-alcohol cessation counseling in the hospital. PCP to manage    # Alcohol use disorder   Last drink was 1/16. still refraining from Alcohol - PCP to follow    3.  Follow-up   BMP- in one week  ( if labs stable may increase lisinopril)   CORE - 5/26- in person  - Labs that day.        I reviewed patients pertinent clinical laboratory studies  I reviewed patients medications  I reviewed patients pertinent medical records      Johnny MURPHY CNP  [facility]

## 2021-05-05 ENCOUNTER — PATIENT OUTREACH (OUTPATIENT)
Dept: CARDIOLOGY | Facility: CLINIC | Age: 56
End: 2021-05-05

## 2021-05-05 DIAGNOSIS — I50.22 CHRONIC SYSTOLIC HEART FAILURE (H): ICD-10-CM

## 2021-05-05 LAB
ANION GAP SERPL CALCULATED.3IONS-SCNC: 5 MMOL/L (ref 3–14)
BUN SERPL-MCNC: 14 MG/DL (ref 7–30)
CALCIUM SERPL-MCNC: 8.8 MG/DL (ref 8.5–10.1)
CHLORIDE SERPL-SCNC: 109 MMOL/L (ref 94–109)
CO2 SERPL-SCNC: 26 MMOL/L (ref 20–32)
CREAT SERPL-MCNC: 1.29 MG/DL (ref 0.66–1.25)
GFR SERPL CREATININE-BSD FRML MDRD: 63 ML/MIN/{1.73_M2}
GLUCOSE SERPL-MCNC: 94 MG/DL (ref 70–99)
POTASSIUM SERPL-SCNC: 4.3 MMOL/L (ref 3.4–5.3)
SODIUM SERPL-SCNC: 140 MMOL/L (ref 133–144)

## 2021-05-05 PROCEDURE — 36415 COLL VENOUS BLD VENIPUNCTURE: CPT | Performed by: NURSE PRACTITIONER

## 2021-05-05 PROCEDURE — 80048 BASIC METABOLIC PNL TOTAL CA: CPT | Performed by: NURSE PRACTITIONER

## 2021-05-05 RX ORDER — LISINOPRIL 5 MG/1
TABLET ORAL
Qty: 180 TABLET | Refills: 3 | COMMUNITY
Start: 2021-05-05 | End: 2021-06-02 | Stop reason: DRUGHIGH

## 2021-05-05 NOTE — TELEPHONE ENCOUNTER
Notified patient that his lab results were stable. Johnny would like patient to increase his lisinopril to 10mg AM/ 5mg PM. Patient agreeable to the plan.     Gerardo Gill RN   Medical Specialty Clinic Care Coordinator  [facility]   Phone: [phone]  Fax: [phone]

## 2021-05-12 ENCOUNTER — TELEPHONE (OUTPATIENT)
Dept: CARDIOLOGY | Facility: CLINIC | Age: 56
End: 2021-05-12

## 2021-05-12 NOTE — TELEPHONE ENCOUNTER
Tried calling patient twice on his home and mobile number.The home number I get a busy signal. The mobile number rings but VM is not activated to leave a message. Patient has been scheduled for [facility] 3 rd. I will check again tomorrow.

## 2021-05-18 DIAGNOSIS — Z11.59 ENCOUNTER FOR SCREENING FOR OTHER VIRAL DISEASES: ICD-10-CM

## 2021-06-02 ENCOUNTER — OFFICE VISIT (OUTPATIENT)
Dept: CARDIOLOGY | Facility: CLINIC | Age: 56
End: 2021-06-02
Payer: COMMERCIAL

## 2021-06-02 VITALS
BODY MASS INDEX: 28.02 KG/M2 | SYSTOLIC BLOOD PRESSURE: 135 MMHG | OXYGEN SATURATION: 99 % | WEIGHT: 181.6 LBS | HEART RATE: 64 BPM | DIASTOLIC BLOOD PRESSURE: 90 MMHG

## 2021-06-02 DIAGNOSIS — I25.10 CORONARY ARTERY DISEASE INVOLVING NATIVE CORONARY ARTERY OF NATIVE HEART WITHOUT ANGINA PECTORIS: ICD-10-CM

## 2021-06-02 DIAGNOSIS — I50.22 CHRONIC SYSTOLIC HEART FAILURE (H): ICD-10-CM

## 2021-06-02 DIAGNOSIS — I50.22 CHRONIC SYSTOLIC HEART FAILURE (H): Primary | ICD-10-CM

## 2021-06-02 DIAGNOSIS — I25.5 ISCHEMIC CARDIOMYOPATHY: ICD-10-CM

## 2021-06-02 DIAGNOSIS — I48.91 ATRIAL FIBRILLATION WITH RVR (H): ICD-10-CM

## 2021-06-02 DIAGNOSIS — I34.0 NONRHEUMATIC MITRAL VALVE REGURGITATION: ICD-10-CM

## 2021-06-02 DIAGNOSIS — F10.11 HISTORY OF ALCOHOL ABUSE: ICD-10-CM

## 2021-06-02 LAB
ANION GAP SERPL CALCULATED.3IONS-SCNC: 5 MMOL/L (ref 3–14)
BUN SERPL-MCNC: 13 MG/DL (ref 7–30)
CALCIUM SERPL-MCNC: 9.3 MG/DL (ref 8.5–10.1)
CHLORIDE SERPL-SCNC: 107 MMOL/L (ref 94–109)
CO2 SERPL-SCNC: 26 MMOL/L (ref 20–32)
CREAT SERPL-MCNC: 1.38 MG/DL (ref 0.66–1.25)
GFR SERPL CREATININE-BSD FRML MDRD: 58 ML/MIN/{1.73_M2}
GLUCOSE SERPL-MCNC: 108 MG/DL (ref 70–99)
POTASSIUM SERPL-SCNC: 4.4 MMOL/L (ref 3.4–5.3)
SODIUM SERPL-SCNC: 138 MMOL/L (ref 133–144)

## 2021-06-02 PROCEDURE — 36415 COLL VENOUS BLD VENIPUNCTURE: CPT | Performed by: NURSE PRACTITIONER

## 2021-06-02 PROCEDURE — 80048 BASIC METABOLIC PNL TOTAL CA: CPT | Performed by: NURSE PRACTITIONER

## 2021-06-02 PROCEDURE — 99214 OFFICE O/P EST MOD 30 MIN: CPT | Performed by: NURSE PRACTITIONER

## 2021-06-02 RX ORDER — LISINOPRIL 10 MG/1
10 TABLET ORAL 2 TIMES DAILY
Qty: 60 TABLET | Refills: 3 | Status: SHIPPED | OUTPATIENT
Start: 2021-06-02 | End: 2021-07-28

## 2021-06-02 ASSESSMENT — PAIN SCALES - GENERAL: PAINLEVEL: NO PAIN (0)

## 2021-06-02 NOTE — NURSING NOTE
Cordell Adams Jr.'s goals for this visit include:   Chief Complaint   Patient presents with     RECHECK       He requests these members of his care team be copied on today's visit information: no    PCP: Becca Morton    Referring Provider:  No referring provider defined for this encounter.    BP (!) 135/90 (BP Location: Left arm, Patient Position: Sitting, Cuff Size: Adult Regular)   Pulse 64   Wt 82.4 kg (181 lb 9.6 oz)   SpO2 99%   BMI 28.02 kg/m      Do you need any medication refills at today's visit? No    Mercy Altamirano CMA......June 2, 2021     3:09 PM

## 2021-06-02 NOTE — PATIENT INSTRUCTIONS
Take your medicines every day, as directed    Changes made today:  o Lisinopril 10 mg twice a day   o    Monitor Your Weight and Symptoms    Contact us if you:      Gain 2 pounds in one day or 5 pounds in one week    Feel more short of breath    Notice more leg swelling    Feel lightheadeded   Change your lifestyle    Limit Salt or Sodium:    2000 mg  Limit Fluids:    2000 mL or approximately 64 ounces  Eat a Heart Healthy Diet    Low in saturated fats  Stay Active:    Aim to move at least 150 minutes every  week         To Contact us    During Business Hours:  [phone], option # 1 (University)  Then option # 4 (medical questions)     After hours, weekends or holidays:   [phone], Option #4  Ask to speak to the On-Call Cardiologist. Inform them you are a CORE/heart failure patient at the [facility].     Use [employer] allows you to communicate directly with your heart team through secure messaging.    "[employer] can be accessed any time on your phone, computer, or tablet.    If you need assistance, we'd be happy to help!         Keep your Heart Appointments:    ASHLIE in 10 days    CORE in Mid- July

## 2021-06-02 NOTE — PROGRESS NOTES
In person   HPI: Cordell is a 55 year old White male with a past medical history of severe MR 2/2 flail P1 posterior mitral leaflet, CAD (s/p PCI to pLAD in 2019), pAF on Eliquis pta, alcohol use disorder c/b withdrawal seizures, and PE (Nov 2019) who was transferred from [facility] on 1/21/2021 for management of newly diagnosed cardiomyopathy (LVEF ~15-20%) and surgical consideration of known severe mitral regurgitation    Inpatient from 1/21-1/28- acute decompensated HFrEF in the setting of afib with RVR, alcohol intoxication and volume overload. An echocardiogram was obtained and showed severely reduced LV function (EF 15-20%) w/ severe diffuse hypokinesis, mild RVSD w/ mild dilation, and severe MR w/ a flail posterior mitral leaflet (p2 scallop). Euvolemia was achieved and has been maintained with 20mg PO lasix daily.    Patient states he has no SOB /ARGUELLO . Patient has no swelling in the legs/ abdomen. Patient has a scale at home and weighs himself and is 179 lbs . Weight fluctuates: 179-182 lbs . Appetite is good.  Patient prepares his own meals. His fiance watches what he eats. He has been staying within the 64 oz of fluid he feels.  He has been sober since hospitalization. Denies PND, orthopnea, chest pain, palpitations, lightheadedness, dizziness, near syncopal/syncopal episodes. Cordell has been following salt and fluid restrictions.        PAST MEDICAL HISTORY:  No past medical history on file.    FAMILY HISTORY:  No family history on file.    SOCIAL HISTORY:  Social History     Tobacco Use     Smoking status: Never Smoker     Smokeless tobacco: Never Used   Substance Use Topics     Alcohol use: None     Drug use: None           CURRENT MEDICATIONS:  Current Outpatient Medications   Medication Sig Dispense Refill     acetaminophen (TYLENOL) 325 MG tablet Take 2 tablets (650 mg) by mouth every 4 hours as needed for mild pain 30 tablet 0     amiodarone (PACERONE) 200 MG tablet Take 1 tablet  (200 mg) by mouth 2 times daily 180 tablet 3     aspirin (ASA) 81 MG chewable tablet Take 1 tablet (81 mg) by mouth daily 90 tablet 3     atorvastatin (LIPITOR) 40 MG tablet Take 1 tablet (40 mg) by mouth every evening 90 tablet 3     folic acid (FOLVITE) 1 MG tablet Take 1 tablet (1 mg) by mouth daily 30 tablet 0     furosemide (LASIX) 20 MG tablet Take 1 tablet (20 mg) by mouth daily (Patient taking differently: Take 20 mg by mouth daily Taking 10 mg a day) 90 tablet 3     lisinopril (ZESTRIL) 10 MG tablet Take 1 tablet (10 mg) by mouth 2 times daily 60 tablet 3     metoprolol succinate ER (TOPROL-XL) 25 MG 24 hr tablet Take 3 tablets (75 mg) by mouth daily (Patient taking differently: Take 25 mg by mouth daily Taking with 50 mg tablet. Total dose of 75 mg a day.) 90 tablet 3     metoprolol succinate ER (TOPROL-XL) 50 MG 24 hr tablet 50 mg daily Taking with 25 mg, total dose of 75 mg a day       rivaroxaban ANTICOAGULANT (XARELTO) 20 MG TABS tablet Take 1 tablet (20 mg) by mouth daily (with dinner) 90 tablet 3     polyethylene glycol (MIRALAX) 17 GM/Dose powder Take 17 g by mouth daily (Patient not taking: Reported on 4/28/2021) 510 g 0       ROS:  Review Of Systems  Skin: negative  Eyes: negative  Ears/Nose/Throat: negative  Respiratory: No shortness of breath, dyspnea on exertion, cough, or hemoptysis  Cardiovascular: negative  Gastrointestinal: negative  Genitourinary: negative  Musculoskeletal: negative  Neurologic: negative  Psychiatric: negative  Hematologic/Lymphatic/Immunologic: negative  Endocrine: negative      EXAM:  BP (!) 135/90 (BP Location: Left arm, Patient Position: Sitting, Cuff Size: Adult Regular)   Pulse 64   Wt 82.4 kg (181 lb 9.6 oz)   SpO2 99%   BMI 28.02 kg/m    Home weight:  General: alert, articulate, and in no acute distress.  HEENT: normocephalic, atraumatic, anicteric sclera, EOMI, mucosa moist, no cyanosis.   Neck: neck supple.  No adenopathy, masses, or carotid bruits.  JVP not  detected at 45 degrees  Heart: regular rhythm, normal S1/S2, no murmur, gallop, rub.  Precordium quiet with normal PMI.     Lungs: clear, no rales, ronchi, or wheezing.  No accessory muscle use, respirations unlabored.   Abdomen: soft, non-tender, bowel sounds present, no hepatomegaly  Extremities:no edema.   No cyanosis.    Neurological: alert and oriented x 3.  normal speech and affect, no gross motor deficits  Skin:  No ecchymoses, rashes, or clubbing.    Labs:  CBC RESULTS:  Lab Results   Component Value Date    WBC 3.8 (L) 01/27/2021    RBC 4.41 01/27/2021    HGB 15.0 01/27/2021    HCT 45.3 01/27/2021     (H) 01/27/2021    MCH 34.0 (H) 01/27/2021    MCHC 33.1 01/27/2021    RDW 15.7 (H) 01/27/2021     01/27/2021       CMP RESULTS:  Lab Results   Component Value Date     06/02/2021    POTASSIUM 4.4 06/02/2021    CHLORIDE 107 06/02/2021    CO2 26 06/02/2021    ANIONGAP 5 06/02/2021     (H) 06/02/2021    BUN 13 06/02/2021    CR 1.38 (H) 06/02/2021    GFRESTIMATED 58 (L) 06/02/2021    GFRESTBLACK 67 06/02/2021    BRAD 9.3 06/02/2021    BILITOTAL 1.7 (H) 01/28/2021    ALBUMIN 2.8 (L) 01/28/2021    ALKPHOS 100 01/28/2021     (H) 01/28/2021    AST 80 (H) 01/28/2021        INR RESULTS:  Lab Results   Component Value Date    INR 1.02 01/25/2021       No components found for: CK  Lab Results   Component Value Date    MAG 2.1 01/26/2021     Lab Results   Component Value Date    NTBNP 70 04/28/2021     @BRIEFLABR (dig)@    Most recent echocardiogram:  No results found for this or any previous visit (from the past 8760 hour(s)).      Assessment and Plan:    In summary,Cordell  is a 55 year old male with history of severe MR 2/2 flail P1 posterior mitral leaflet, CAD (s/p PCI to pLAD in 2019), pAF on Eliquis pta, alcohol use disorder c/b withdrawal seizures, and PE (Nov 2019) who was transferred from [facility] on 1/21/2021 for management of newly diagnosed cardiomyopathy (LVEF  ~15-20%- now 45-50 % and surgical consideration of known severe mitral regurgitation.         1.  Chronic systolic heart failure secondary to ICM.  Stage C  NYHA Class III  ACEi/ARB: yes- lisinopril 10 mg BID- increase  BB: yes-Toprol 75 mg - no further increase   Aldosterone antagonist: deferred while other medical therapy is prioritized- will add once creat is more stable.   SCD prophylaxis: EF improved  3/4/21- 45-50%  Fluid status: euvolemic- furosemide 10 mg daily  Anticoagulation: xaralto  Antiplatelet:  ASA dose   Sleep apnea:not discussed  NSAID use:  Contraindicated.  Avoid use.  Remote Monitoring:none    2.  Other comordbid conditions: is    # Severe mitral regurgitation  2/2 flail posterior mitral leaflet.  During admission at the [facility], he was seen by interventional cardiology and structural cardiology. There is concern at this time that he would tolerate complete surgical open repair given markedly reduced LVEF- may have difficulty coming off bypass. Other option would be a mitral clip. - - appt tomorrow.     # Paroxysmal Afib with RVR, currently relatively rate controlled   Rates at OSH 110s-180s recently, likely 2/2 alcohol withdrawal, decompensated heart failure and aspiration pneumonia.  Prior to hospitalization was prescribed Amiodarone 200mg, Cardioverted once in 2018 in setting of etoh withdrawal. Currently on Metoprolol [id number] mg daily    #Alcoholic hepatitis-alcohol cessation counseling in the hospital. PCP to manage    # Alcohol use disorder   Last drink was 1/16. still refraining from Alcohol - PCP to follow    3.  Follow-up   Lisinopril 10 mg BID   BMP- in 10 days   [facility] July       I reviewed patients pertinent clinical laboratory studies  I reviewed patients medications  I reviewed patients pertinent medical records      Johnny MURPHY, CNP  [facility]

## 2021-06-14 ENCOUNTER — PATIENT OUTREACH (OUTPATIENT)
Dept: CARDIOLOGY | Facility: CLINIC | Age: 56
End: 2021-06-14

## 2021-06-14 DIAGNOSIS — I50.22 CHRONIC SYSTOLIC HEART FAILURE (H): ICD-10-CM

## 2021-06-14 LAB
ANION GAP SERPL CALCULATED.3IONS-SCNC: 5 MMOL/L (ref 3–14)
BUN SERPL-MCNC: 15 MG/DL (ref 7–30)
CALCIUM SERPL-MCNC: 8.7 MG/DL (ref 8.5–10.1)
CHLORIDE SERPL-SCNC: 109 MMOL/L (ref 94–109)
CO2 SERPL-SCNC: 27 MMOL/L (ref 20–32)
CREAT SERPL-MCNC: 1.39 MG/DL (ref 0.66–1.25)
GFR SERPL CREATININE-BSD FRML MDRD: 57 ML/MIN/{1.73_M2}
GLUCOSE SERPL-MCNC: 108 MG/DL (ref 70–99)
POTASSIUM SERPL-SCNC: 4.1 MMOL/L (ref 3.4–5.3)
SODIUM SERPL-SCNC: 141 MMOL/L (ref 133–144)

## 2021-06-14 PROCEDURE — 36415 COLL VENOUS BLD VENIPUNCTURE: CPT | Performed by: NURSE PRACTITIONER

## 2021-06-14 PROCEDURE — 80048 BASIC METABOLIC PNL TOTAL CA: CPT | Performed by: NURSE PRACTITIONER

## 2021-06-14 NOTE — TELEPHONE ENCOUNTER
Patient was seen in the CORE clinic on 6/2/21. The plan following the visit was Lisinopril 10 mg BID,   BMP- in 10 days . At that visit the patient reported their weight to be 179 pounds.     Most recent weights: Staying stable, didn't weigh himself today. States he stays between 179-181.     Most recent BPs: Not taking    Patient denies SOB and swelling.     Other Symptoms/Concerns: None, patient states he has been feeling fine with the increase in Lisinopril.     I reviewed lab results with patient, kidney function stable.     Follow up Appointments: Labs and follow up appointment in July.     Message will be sent to Johnny Gonzales CNP to review.     Gerardo Gill RN   Medical Specialty Clinic Care Coordinator  [facility]   Phone: [phone]  Fax: [phone]

## 2021-06-16 NOTE — TELEPHONE ENCOUNTER
Per Johnny no changes to his medications. Follow up and labs on 7/28/21.     Unable to leave message. Will try again later.     Gerardo Gill RN   Medical Specialty Clinic Care Coordinator  [facility]   Phone: [phone]  Fax: [phone]

## 2021-07-14 ENCOUNTER — TELEPHONE (OUTPATIENT)
Dept: CARDIOLOGY | Facility: CLINIC | Age: 56
End: 2021-07-14

## 2021-07-28 ENCOUNTER — OFFICE VISIT (OUTPATIENT)
Dept: CARDIOLOGY | Facility: CLINIC | Age: 56
End: 2021-07-28
Payer: COMMERCIAL

## 2021-07-28 ENCOUNTER — LAB (OUTPATIENT)
Dept: LAB | Facility: CLINIC | Age: 56
End: 2021-07-28
Payer: COMMERCIAL

## 2021-07-28 VITALS
OXYGEN SATURATION: 99 % | WEIGHT: 186 LBS | HEART RATE: 65 BPM | DIASTOLIC BLOOD PRESSURE: 90 MMHG | SYSTOLIC BLOOD PRESSURE: 145 MMHG | BODY MASS INDEX: 28.7 KG/M2

## 2021-07-28 DIAGNOSIS — I25.5 ISCHEMIC CARDIOMYOPATHY: ICD-10-CM

## 2021-07-28 DIAGNOSIS — F10.11 HISTORY OF ALCOHOL ABUSE: ICD-10-CM

## 2021-07-28 DIAGNOSIS — I34.0 MITRAL VALVE INSUFFICIENCY, UNSPECIFIED ETIOLOGY: ICD-10-CM

## 2021-07-28 DIAGNOSIS — I48.91 ATRIAL FIBRILLATION WITH RVR (H): ICD-10-CM

## 2021-07-28 DIAGNOSIS — I50.22 CHRONIC SYSTOLIC HEART FAILURE (H): Primary | ICD-10-CM

## 2021-07-28 DIAGNOSIS — I50.22 CHRONIC SYSTOLIC HEART FAILURE (H): ICD-10-CM

## 2021-07-28 DIAGNOSIS — I25.10 CORONARY ARTERY DISEASE INVOLVING NATIVE CORONARY ARTERY OF NATIVE HEART WITHOUT ANGINA PECTORIS: ICD-10-CM

## 2021-07-28 LAB
ANION GAP SERPL CALCULATED.3IONS-SCNC: 6 MMOL/L (ref 3–14)
BUN SERPL-MCNC: 13 MG/DL (ref 7–30)
CALCIUM SERPL-MCNC: 8.7 MG/DL (ref 8.5–10.1)
CHLORIDE BLD-SCNC: 108 MMOL/L (ref 94–109)
CO2 SERPL-SCNC: 27 MMOL/L (ref 20–32)
CREAT SERPL-MCNC: 1.56 MG/DL (ref 0.66–1.25)
GFR SERPL CREATININE-BSD FRML MDRD: 50 ML/MIN/1.73M2
GLUCOSE BLD-MCNC: 110 MG/DL (ref 70–99)
POTASSIUM BLD-SCNC: 4 MMOL/L (ref 3.4–5.3)
SODIUM SERPL-SCNC: 141 MMOL/L (ref 133–144)

## 2021-07-28 PROCEDURE — 99213 OFFICE O/P EST LOW 20 MIN: CPT | Performed by: NURSE PRACTITIONER

## 2021-07-28 PROCEDURE — 36415 COLL VENOUS BLD VENIPUNCTURE: CPT

## 2021-07-28 PROCEDURE — 80048 BASIC METABOLIC PNL TOTAL CA: CPT

## 2021-07-28 RX ORDER — LISINOPRIL 20 MG/1
20 TABLET ORAL 2 TIMES DAILY
Qty: 60 TABLET | Refills: 3 | Status: SHIPPED | OUTPATIENT
Start: 2021-07-28 | End: 2021-11-24

## 2021-07-28 NOTE — PATIENT INSTRUCTIONS
Take your medicines every day, as directed    Changes made today:  Lasix increase to 20 mg daily   Lisinopril 20 mg BID - increase       Monitor Your Weight and Symptoms    Contact us if you:      Gain 2 pounds in one day or 5 pounds in one week    Feel more short of breath    Notice more leg swelling    Feel lightheadeded   Change your lifestyle    Limit Salt or Sodium:    2000 mg  Limit Fluids:    2000 mL or approximately 64 ounces  Eat a Heart Healthy Diet    Low in saturated fats  Stay Active:    Aim to move at least 150 minutes every  week         To Contact us    During Business Hours:  [phone]      After hours, weekends or holidays:   [phone], Option #4  Ask to speak to the On-Call Cardiologist. Inform them you are a CORE/heart failure patient at the [facility].     Use [employer] allows you to communicate directly with your heart team through secure messaging.    [employer] can be accessed any time on your phone, computer, or tablet.    If you need assistance, we'd be happy to help!         Keep your Heart Appointments:    BMP- in 7 days   Dr. Wade next available   CORE - September with Labs

## 2021-07-28 NOTE — PROGRESS NOTES
In person   HPI: Cordell is a 55 year old White male with a past medical history of severe MR 2/2 flail P1 posterior mitral leaflet, CAD (s/p PCI to pLAD in 2019), pAF on Eliquis pta, alcohol use disorder c/b withdrawal seizures, and PE (Nov 2019) who was transferred from [facility] on 1/21/2021 for management of newly diagnosed cardiomyopathy (LVEF ~15-20%) and surgical consideration of known severe mitral regurgitation    Inpatient from 1/21-1/28- acute decompensated HFrEF in the setting of afib with RVR, alcohol intoxication and volume overload. An echocardiogram was obtained and showed severely reduced LV function (EF 15-20%) w/ severe diffuse hypokinesis, mild RVSD w/ mild dilation, and severe MR w/ a flail posterior mitral leaflet (p2 scallop). Euvolemia was achieved and has been maintained with 20mg PO lasix daily.    Patient states he has no SOB /ARGUELLO . Patient has no swelling in the legs/ abdomen. Patient has a scale at home and weighs himself and is 182 lbs ( higher than before 178 lbs) . Weight fluctuates: 182-185 lbs . Appetite is good. Hasn't been as active. Patient prepares his own meals. His fiance watches what he eats as well he states. He has been staying within the 64 oz of fluid he feels.  He has been sober since hospitalization. Denies PND, orthopnea, chest pain, palpitations, lightheadedness, dizziness, near syncopal/syncopal episodes. Cordell has been following salt and fluid restrictions.        PAST MEDICAL HISTORY:  No past medical history on file.    FAMILY HISTORY:  No family history on file.    SOCIAL HISTORY:  Social History     Tobacco Use     Smoking status: Never Smoker     Smokeless tobacco: Never Used   Substance Use Topics     Alcohol use: Not on file     Drug use: Not on file           CURRENT MEDICATIONS:  Current Outpatient Medications   Medication Sig Dispense Refill     acetaminophen (TYLENOL) 325 MG tablet Take 2 tablets (650 mg) by mouth every 4 hours as needed  for mild pain 30 tablet 0     amiodarone (PACERONE) 200 MG tablet Take 1 tablet (200 mg) by mouth 2 times daily 180 tablet 3     aspirin (ASA) 81 MG chewable tablet Take 1 tablet (81 mg) by mouth daily 90 tablet 3     atorvastatin (LIPITOR) 40 MG tablet Take 1 tablet (40 mg) by mouth every evening 90 tablet 3     folic acid (FOLVITE) 1 MG tablet Take 1 tablet (1 mg) by mouth daily 30 tablet 0     furosemide (LASIX) 20 MG tablet Take 1 tablet (20 mg) by mouth daily (Patient taking differently: Take 20 mg by mouth daily Taking 10 mg a day) 90 tablet 3     lisinopril (ZESTRIL) 10 MG tablet Take 1 tablet (10 mg) by mouth 2 times daily 60 tablet 3     metoprolol succinate ER (TOPROL-XL) 25 MG 24 hr tablet Take 3 tablets (75 mg) by mouth daily (Patient taking differently: Take 25 mg by mouth daily Taking with 50 mg tablet. Total dose of 75 mg a day.) 90 tablet 3     metoprolol succinate ER (TOPROL-XL) 50 MG 24 hr tablet 50 mg daily Taking with 25 mg, total dose of 75 mg a day       polyethylene glycol (MIRALAX) 17 GM/Dose powder Take 17 g by mouth daily 510 g 0     rivaroxaban ANTICOAGULANT (XARELTO) 20 MG TABS tablet Take 1 tablet (20 mg) by mouth daily (with dinner) 90 tablet 3       ROS:  Review Of Systems  Skin: negative  Eyes: negative  Ears/Nose/Throat: negative  Respiratory: No shortness of breath, dyspnea on exertion, cough, or hemoptysis  Cardiovascular: negative  Gastrointestinal: negative  Genitourinary: negative  Musculoskeletal: negative  Neurologic: negative  Psychiatric: negative  Hematologic/Lymphatic/Immunologic: negative  Endocrine: negative      EXAM:  BP (!) 145/90   Pulse 65   Wt 84.4 kg (186 lb)   SpO2 99%   BMI 28.70 kg/m    Home weight:  General: alert, articulate, and in no acute distress.  HEENT: normocephalic, atraumatic, anicteric sclera, EOMI, mucosa moist, no cyanosis.   Neck: neck supple.  No adenopathy, masses, or carotid bruits.  JVP not detected at 45 degrees  Heart: regular rhythm,  normal S1/S2, no murmur, gallop, rub.  Precordium quiet with normal PMI.     Lungs: clear, no rales, ronchi, or wheezing.  No accessory muscle use, respirations unlabored.   Abdomen: soft, non-tender, bowel sounds present, no hepatomegaly  Extremities:no edema.   No cyanosis.    Neurological: alert and oriented x 3.  normal speech and affect, no gross motor deficits  Skin:  No ecchymoses, rashes, or clubbing.    Labs:  CBC RESULTS:  Lab Results   Component Value Date    WBC 3.8 (L) 01/27/2021    RBC 4.41 01/27/2021    HGB 15.0 01/27/2021    HCT 45.3 01/27/2021     (H) 01/27/2021    MCH 34.0 (H) 01/27/2021    MCHC 33.1 01/27/2021    RDW 15.7 (H) 01/27/2021     01/27/2021       CMP RESULTS:  Lab Results   Component Value Date     07/28/2021     06/14/2021    POTASSIUM 4.0 07/28/2021    POTASSIUM 4.1 06/14/2021    CHLORIDE 108 07/28/2021    CHLORIDE 109 06/14/2021    CO2 27 07/28/2021    CO2 27 06/14/2021    ANIONGAP 6 07/28/2021    ANIONGAP 5 06/14/2021     (H) 07/28/2021     (H) 06/14/2021    BUN 13 07/28/2021    BUN 15 06/14/2021    CR 1.56 (H) 07/28/2021    CR 1.39 (H) 06/14/2021    GFRESTIMATED 50 (L) 07/28/2021    GFRESTIMATED 57 (L) 06/14/2021    GFRESTBLACK 66 06/14/2021    BRAD 8.7 07/28/2021    BRAD 8.7 06/14/2021    BILITOTAL 1.7 (H) 01/28/2021    ALBUMIN 2.8 (L) 01/28/2021    ALKPHOS 100 01/28/2021     (H) 01/28/2021    AST 80 (H) 01/28/2021        INR RESULTS:  Lab Results   Component Value Date    INR 1.02 01/25/2021       No components found for: CK  Lab Results   Component Value Date    MAG 2.1 01/26/2021     Lab Results   Component Value Date    NTBNP 70 04/28/2021     @BRIEFLABR (dig)@    Most recent echocardiogram:  No results found for this or any previous visit (from the past 8760 hour(s)).      Assessment and Plan:    In summary,Cordell  is a 55 year old male with history of severe MR 2/2 flail P1 posterior mitral leaflet, CAD (s/p PCI to  pLAD in 2019), pAF on Eliquis pta, alcohol use disorder c/b withdrawal seizures, and PE (Nov 2019) who was transferred from [facility] on 1/21/2021 for management of newly diagnosed cardiomyopathy (LVEF ~15-20%- now 45-50 % and surgical consideration of known severe mitral regurgitation.     1.  Chronic systolic heart failure secondary to ICM.  Stage C  NYHA Class III  ACEi/ARB: yes- lisinopril 20 mg BID- increase  BB: yes-Toprol 75 mg - no further increase   Aldosterone antagonist: deferred while other medical therapy is prioritized- will add once creat is more stable.   SCD prophylaxis: EF improved  3/4/21- 45-50%  Fluid status: hypervolemic- furosemide 20 mg daily  Anticoagulation: xaralto  Antiplatelet:  ASA dose   Sleep apnea:not discussed  NSAID use:  Contraindicated.  Avoid use.  Remote Monitoring:none    2.  Other comordbid conditions:     # Severe mitral regurgitation  2/2 flail posterior mitral leaflet.  During admission at the [facility], he was seen by interventional cardiology and structural cardiology. There is concern at this time that he would tolerate complete surgical open repair given markedly reduced LVEF- may have difficulty coming off bypass. Other option would be a mitral clip. - - appt tomorrow.     # Paroxysmal Afib with RVR, currently relatively rate controlled   Rates at OSH 110s-180s recently, likely 2/2 alcohol withdrawal, decompensated heart failure and aspiration pneumonia.  Prior to hospitalization was prescribed Amiodarone 200mg, Cardioverted once in 2018 in setting of etoh withdrawal. Currently on Metoprolol [id number] mg daily    #Alcoholic hepatitis-alcohol cessation counseling in the hospital. PCP to manage    # Alcohol use disorder   Last drink was 1/16/21. still refraining from Alcohol - PCP to follow    3.  Follow-up   Lasix increase to 20 mg daily   Lisinopril 20 mg BID - increase   BMP- in 7 days   Dr. Wade next available   [facility] - September       I reviewed  patients pertinent clinical laboratory studies  I reviewed patients medications  I reviewed patients pertinent medical records      Johnny MURPHY CNP  [facility]

## 2021-08-06 ENCOUNTER — LAB (OUTPATIENT)
Dept: LAB | Facility: CLINIC | Age: 56
End: 2021-08-06
Payer: COMMERCIAL

## 2021-08-06 DIAGNOSIS — I50.22 CHRONIC SYSTOLIC HEART FAILURE (H): ICD-10-CM

## 2021-08-06 LAB
ANION GAP SERPL CALCULATED.3IONS-SCNC: 2 MMOL/L (ref 3–14)
BUN SERPL-MCNC: 18 MG/DL (ref 7–30)
CALCIUM SERPL-MCNC: 9 MG/DL (ref 8.5–10.1)
CHLORIDE BLD-SCNC: 110 MMOL/L (ref 94–109)
CO2 SERPL-SCNC: 27 MMOL/L (ref 20–32)
CREAT SERPL-MCNC: 1.67 MG/DL (ref 0.66–1.25)
GFR SERPL CREATININE-BSD FRML MDRD: 46 ML/MIN/1.73M2
GLUCOSE BLD-MCNC: 116 MG/DL (ref 70–99)
POTASSIUM BLD-SCNC: 4.4 MMOL/L (ref 3.4–5.3)
SODIUM SERPL-SCNC: 139 MMOL/L (ref 133–144)

## 2021-08-06 PROCEDURE — 36415 COLL VENOUS BLD VENIPUNCTURE: CPT

## 2021-08-06 PROCEDURE — 80048 BASIC METABOLIC PNL TOTAL CA: CPT

## 2021-08-09 ENCOUNTER — PATIENT OUTREACH (OUTPATIENT)
Dept: CARDIOLOGY | Facility: CLINIC | Age: 56
End: 2021-08-09

## 2021-08-09 DIAGNOSIS — I25.10 CORONARY ARTERY DISEASE INVOLVING NATIVE CORONARY ARTERY OF NATIVE HEART WITHOUT ANGINA PECTORIS: ICD-10-CM

## 2021-08-09 DIAGNOSIS — I50.9 ACUTE DECOMPENSATED HEART FAILURE (H): ICD-10-CM

## 2021-08-09 DIAGNOSIS — I50.22 CHRONIC SYSTOLIC HEART FAILURE (H): Primary | ICD-10-CM

## 2021-08-09 RX ORDER — FUROSEMIDE 20 MG
10 TABLET ORAL DAILY
Qty: 90 TABLET | Refills: 3 | COMMUNITY
Start: 2021-08-09 | End: 2021-10-27

## 2021-08-09 NOTE — TELEPHONE ENCOUNTER
Per Johnny, patient should decrease Lasix to 10mg daily. Repeat BMP in a week. Patient agreeable to the plan. Patient scheduled for labs on 8/13/21 at 10:30am.     Gerardo Gill RN   Cardiology Care Coordinator  [facility]   Phone: [phone]  Fax: [phone]

## 2021-08-09 NOTE — TELEPHONE ENCOUNTER
Patient was seen in the CORE clinic on 7/28/21. The plan following the visit was to increase Lasix to 20 mg daily, increase Lisinopril to 20 mg BID. At that visit the patient reported their weight to be 182-185.     Most recent weights: Stable down about a pound or two. 184 pounds today.    Patient does not check his BP.     Patient denies SOB or swelling.     Other Symptoms/Concerns: None, patient has not noticed any real change with the increase in medications.     Current Medications: Lasix 20 mg daily, Lisinopril 20 mg BID.     We discussed patient's labs from Friday, Creatinine slightly elevated.     Follow up Appointments: 9/1/21 labs and CORE follow up, 9/16/21 [place] follow up.     Message will be sent to Johnny Gnozales CNP to review.       Gerardo Gill RN   Cardiology Care Coordinator  [facility]   Phone: [phone]  Fax: [phone]

## 2021-08-17 ENCOUNTER — LAB (OUTPATIENT)
Dept: LAB | Facility: CLINIC | Age: 56
End: 2021-08-17
Payer: COMMERCIAL

## 2021-08-17 DIAGNOSIS — I50.22 CHRONIC SYSTOLIC HEART FAILURE (H): ICD-10-CM

## 2021-08-17 LAB
ANION GAP SERPL CALCULATED.3IONS-SCNC: 4 MMOL/L (ref 3–14)
BUN SERPL-MCNC: 11 MG/DL (ref 7–30)
CALCIUM SERPL-MCNC: 8.8 MG/DL (ref 8.5–10.1)
CHLORIDE BLD-SCNC: 108 MMOL/L (ref 94–109)
CO2 SERPL-SCNC: 27 MMOL/L (ref 20–32)
CREAT SERPL-MCNC: 1.62 MG/DL (ref 0.66–1.25)
GFR SERPL CREATININE-BSD FRML MDRD: 48 ML/MIN/1.73M2
GLUCOSE BLD-MCNC: 118 MG/DL (ref 70–99)
POTASSIUM BLD-SCNC: 4.4 MMOL/L (ref 3.4–5.3)
SODIUM SERPL-SCNC: 139 MMOL/L (ref 133–144)

## 2021-08-17 PROCEDURE — 36415 COLL VENOUS BLD VENIPUNCTURE: CPT

## 2021-08-17 PROCEDURE — 80048 BASIC METABOLIC PNL TOTAL CA: CPT

## 2021-08-18 ENCOUNTER — PATIENT OUTREACH (OUTPATIENT)
Dept: CARDIOLOGY | Facility: CLINIC | Age: 56
End: 2021-08-18

## 2021-08-18 NOTE — TELEPHONE ENCOUNTER
Notified patient, no change in plan.   Gerardo Gill RN   Cardiology Care Coordinator  [facility]   Phone: [phone]  Fax: [phone]

## 2021-08-18 NOTE — TELEPHONE ENCOUNTER
Follow up call to patient to see how patient is doing since their most recent medication change. The plan on 8/9/21  was for the patient to decrease Lasix to 10mg daily and repeat a BMP in a week. Patients weight at that time was 184 pounds.    The patient had labs drawn yesterday, labs look stable from previous draw on 8/6/21.     Most recent weights: 185 pounds    Patient denies SOB or swelling. Patient believes he is drinking about 64 ounces of fluid.     Other Symptoms/Concerns: Overall feeling good.     Current Medications: Lasix 10 mg daily    Follow up Appointments: 9/1/21 labs and follow up.       Message will be sent to Johnny Gonzales CNP to review.     Gerardo Gill RN   Cardiology Care Coordinator  [facility]   Phone: [phone]  Fax: [phone]

## 2021-08-18 NOTE — TELEPHONE ENCOUNTER
Attempted to reach patient to let him know that there is no change in the plan. No voicemail on his cell phone and home number is not in service.     Will try him again later today.     Gerardo Gill RN   Cardiology Care Coordinator  [facility]   Phone: [phone]  Fax: [phone]

## 2021-08-31 ENCOUNTER — TELEPHONE (OUTPATIENT)
Dept: CARDIOLOGY | Facility: CLINIC | Age: 56
End: 2021-08-31

## 2021-09-01 ENCOUNTER — LAB (OUTPATIENT)
Dept: LAB | Facility: CLINIC | Age: 56
End: 2021-09-01

## 2021-09-01 ENCOUNTER — OFFICE VISIT (OUTPATIENT)
Dept: CARDIOLOGY | Facility: CLINIC | Age: 56
End: 2021-09-01
Payer: COMMERCIAL

## 2021-09-01 VITALS
HEIGHT: 68 IN | OXYGEN SATURATION: 100 % | WEIGHT: 187.2 LBS | DIASTOLIC BLOOD PRESSURE: 87 MMHG | BODY MASS INDEX: 28.37 KG/M2 | HEART RATE: 64 BPM | SYSTOLIC BLOOD PRESSURE: 130 MMHG

## 2021-09-01 DIAGNOSIS — I48.91 ATRIAL FIBRILLATION WITH RVR (H): ICD-10-CM

## 2021-09-01 DIAGNOSIS — I25.10 CORONARY ARTERY DISEASE INVOLVING NATIVE CORONARY ARTERY OF NATIVE HEART WITHOUT ANGINA PECTORIS: ICD-10-CM

## 2021-09-01 DIAGNOSIS — Z87.898 HISTORY OF ALCOHOL USE: ICD-10-CM

## 2021-09-01 DIAGNOSIS — I34.0 MITRAL VALVE INSUFFICIENCY, UNSPECIFIED ETIOLOGY: ICD-10-CM

## 2021-09-01 DIAGNOSIS — I50.22 CHRONIC SYSTOLIC HEART FAILURE (H): Primary | ICD-10-CM

## 2021-09-01 DIAGNOSIS — I25.5 ISCHEMIC CARDIOMYOPATHY: ICD-10-CM

## 2021-09-01 DIAGNOSIS — I50.22 CHRONIC SYSTOLIC HEART FAILURE (H): ICD-10-CM

## 2021-09-01 LAB
ANION GAP SERPL CALCULATED.3IONS-SCNC: 5 MMOL/L (ref 3–14)
BUN SERPL-MCNC: 16 MG/DL (ref 7–30)
CALCIUM SERPL-MCNC: 8.8 MG/DL (ref 8.5–10.1)
CHLORIDE BLD-SCNC: 108 MMOL/L (ref 94–109)
CO2 SERPL-SCNC: 27 MMOL/L (ref 20–32)
CREAT SERPL-MCNC: 1.57 MG/DL (ref 0.66–1.25)
GFR SERPL CREATININE-BSD FRML MDRD: 50 ML/MIN/1.73M2
GLUCOSE BLD-MCNC: 119 MG/DL (ref 70–99)
POTASSIUM BLD-SCNC: 4.7 MMOL/L (ref 3.4–5.3)
SODIUM SERPL-SCNC: 140 MMOL/L (ref 133–144)

## 2021-09-01 PROCEDURE — 99213 OFFICE O/P EST LOW 20 MIN: CPT | Performed by: NURSE PRACTITIONER

## 2021-09-01 PROCEDURE — 80048 BASIC METABOLIC PNL TOTAL CA: CPT

## 2021-09-01 PROCEDURE — 36415 COLL VENOUS BLD VENIPUNCTURE: CPT

## 2021-09-01 ASSESSMENT — PAIN SCALES - GENERAL: PAINLEVEL: NO PAIN (0)

## 2021-09-01 ASSESSMENT — MIFFLIN-ST. JEOR: SCORE: 1667.25

## 2021-09-01 NOTE — PROGRESS NOTES
In person   HPI: Cordell is a 55 year old White male with a past medical history of severe MR 2/2 flail P1 posterior mitral leaflet, CAD (s/p PCI to pLAD in 2019), pAF on Eliquis pta, alcohol use disorder c/b withdrawal seizures, and PE (Nov 2019) who was transferred from [facility] on 1/21/2021 for management of newly diagnosed cardiomyopathy (LVEF ~15-20%) and surgical consideration of known severe mitral regurgitation    Inpatient from 1/21-1/28- acute decompensated HFrEF in the setting of afib with RVR, alcohol intoxication and volume overload. An echocardiogram was obtained and showed severely reduced LV function (EF 15-20%) w/ severe diffuse hypokinesis, mild RVSD w/ mild dilation, and severe MR w/ a flail posterior mitral leaflet (p2 scallop). Euvolemia was achieved and has been maintained with 20mg PO lasix daily.    Patient states he has no SOB /ARUGELLO . Patient has no swelling in the legs/ abdomen. Was suppose to be taking 20 mg lasix daily and thougt he was still on 10 mg daily.  Patient has a scale at home and weighs himself and is 185 lbs ( higher than before 178 lbs) . Weight fluctuates: 182-185 lbs . Appetite is good. Hasn't been as active. Patient prepares his own meals. His fiance watches what he eats as well he states. He has been staying within the 64 oz of fluid he feels.  He has been sober since hospitalization. Denies PND, orthopnea, chest pain, palpitations, lightheadedness, dizziness, near syncopal/syncopal episodes. Cordell has been following salt and fluid restrictions.        PAST MEDICAL HISTORY:  No past medical history on file.    FAMILY HISTORY:  No family history on file.    SOCIAL HISTORY:  Social History     Tobacco Use     Smoking status: Never Smoker     Smokeless tobacco: Never Used   Substance Use Topics     Alcohol use: None     Drug use: None           CURRENT MEDICATIONS:  Current Outpatient Medications   Medication Sig Dispense Refill     acetaminophen (TYLENOL)  "325 MG tablet Take 2 tablets (650 mg) by mouth every 4 hours as needed for mild pain 30 tablet 0     amiodarone (PACERONE) 200 MG tablet Take 1 tablet (200 mg) by mouth 2 times daily 180 tablet 3     aspirin (ASA) 81 MG chewable tablet Take 1 tablet (81 mg) by mouth daily 90 tablet 3     atorvastatin (LIPITOR) 40 MG tablet Take 1 tablet (40 mg) by mouth every evening 90 tablet 3     furosemide (LASIX) 20 MG tablet Take 0.5 tablets (10 mg) by mouth daily 90 tablet 3     lisinopril (ZESTRIL) 20 MG tablet Take 1 tablet (20 mg) by mouth 2 times daily 60 tablet 3     metoprolol succinate ER (TOPROL-XL) 25 MG 24 hr tablet Take 3 tablets (75 mg) by mouth daily (Patient taking differently: Take 25 mg by mouth daily Taking with 50 mg tablet. Total dose of 75 mg a day.) 90 tablet 3     rivaroxaban ANTICOAGULANT (XARELTO) 20 MG TABS tablet Take 1 tablet (20 mg) by mouth daily (with dinner) 90 tablet 3     folic acid (FOLVITE) 1 MG tablet Take 1 tablet (1 mg) by mouth daily (Patient not taking: Reported on 9/1/2021) 30 tablet 0     metoprolol succinate ER (TOPROL-XL) 50 MG 24 hr tablet 50 mg daily Taking with 25 mg, total dose of 75 mg a day       polyethylene glycol (MIRALAX) 17 GM/Dose powder Take 17 g by mouth daily (Patient not taking: Reported on 9/1/2021) 510 g 0       ROS:  Review Of Systems  Skin: negative  Eyes: negative  Ears/Nose/Throat: negative  Respiratory: No shortness of breath, dyspnea on exertion, cough, or hemoptysis  Cardiovascular: negative  Gastrointestinal: negative  Genitourinary: negative  Musculoskeletal: negative  Neurologic: negative  Psychiatric: negative  Hematologic/Lymphatic/Immunologic: negative  Endocrine: negative      EXAM:  /87 (BP Location: Right arm, Patient Position: Sitting, Cuff Size: Adult Regular)   Pulse 64   Ht 1.725 m (5' 7.91\")   Wt 84.9 kg (187 lb 3.2 oz)   SpO2 100%   BMI 28.54 kg/m    Home weight:  General: alert, articulate, and in no acute distress.  HEENT: " normocephalic, atraumatic, anicteric sclera, EOMI, mucosa moist, no cyanosis.   Neck: neck supple.  No adenopathy, masses, or carotid bruits.  JVP not detected at 45 degrees  Heart: regular rhythm, normal S1/S2, no murmur, gallop, rub.  Precordium quiet with normal PMI.     Lungs: clear, no rales, ronchi, or wheezing.  No accessory muscle use, respirations unlabored.   Abdomen: soft, non-tender, bowel sounds present, no hepatomegaly  Extremities: trace edema.   No cyanosis.    Neurological: alert and oriented x 3.  normal speech and affect, no gross motor deficits  Skin:  No ecchymoses, rashes, or clubbing.    Labs:  CBC RESULTS:  Lab Results   Component Value Date    WBC 3.8 (L) 01/27/2021    RBC 4.41 01/27/2021    HGB 15.0 01/27/2021    HCT 45.3 01/27/2021     (H) 01/27/2021    MCH 34.0 (H) 01/27/2021    MCHC 33.1 01/27/2021    RDW 15.7 (H) 01/27/2021     01/27/2021       CMP RESULTS:  Lab Results   Component Value Date     09/01/2021     06/14/2021    POTASSIUM 4.7 09/01/2021    POTASSIUM 4.1 06/14/2021    CHLORIDE 108 09/01/2021    CHLORIDE 109 06/14/2021    CO2 27 09/01/2021    CO2 27 06/14/2021    ANIONGAP 5 09/01/2021    ANIONGAP 5 06/14/2021     (H) 09/01/2021     (H) 06/14/2021    BUN 16 09/01/2021    BUN 15 06/14/2021    CR 1.57 (H) 09/01/2021    CR 1.39 (H) 06/14/2021    GFRESTIMATED 50 (L) 09/01/2021    GFRESTIMATED 57 (L) 06/14/2021    GFRESTBLACK 66 06/14/2021    BRAD 8.8 09/01/2021    BRAD 8.7 06/14/2021    BILITOTAL 1.7 (H) 01/28/2021    ALBUMIN 2.8 (L) 01/28/2021    ALKPHOS 100 01/28/2021     (H) 01/28/2021    AST 80 (H) 01/28/2021        INR RESULTS:  Lab Results   Component Value Date    INR 1.02 01/25/2021       No components found for: CK  Lab Results   Component Value Date    MAG 2.1 01/26/2021     Lab Results   Component Value Date    NTBNP 70 04/28/2021     @BRIEFLABR (dig)@    Most recent echocardiogram:  No results found for this or any previous  visit (from the past 8760 hour(s)).      Assessment and Plan:    In summary,Cordell  is a 55 year old male with history of severe MR 2/2 flail P1 posterior mitral leaflet, CAD (s/p PCI to pLAD in 2019), pAF on Eliquis pta, alcohol use disorder c/b withdrawal seizures, and PE (Nov 2019) who was transferred from [facility] on 1/21/2021 for management of newly diagnosed cardiomyopathy (LVEF ~15-20%- now 45-50 % and surgical consideration of known severe mitral regurgitation.     1.  Chronic systolic heart failure secondary to ICM.  Stage C  NYHA Class III  ACEi/ARB: yes- lisinopril 20 mg BID-no further increases   BB: yes-Toprol 75 mg - no further increase   Aldosterone antagonist: deferred while other medical therapy is prioritized- will add once creat is more stable.   SCD prophylaxis: EF improved  3/4/21- 45-50%  Fluid status: hypervolemic- furosemide 20 mg daily  Anticoagulation: xaralto  Antiplatelet:  ASA dose   Sleep apnea:not discussed  NSAID use:  Contraindicated.  Avoid use.  Remote Monitoring:none    2.  Other comordbid conditions:     # Severe mitral regurgitation  2/2 flail posterior mitral leaflet.  During admission at the [facility], he was seen by interventional cardiology and structural cardiology. There is concern at this time that he would tolerate complete surgical open repair given markedly reduced LVEF- may have difficulty coming off bypass. Other option would be a mitral clip. Needs to schedule appt and testing.     # Paroxysmal Afib with RVR, currently relatively rate controlled   Rates at OSH 110s-180s recently, likely 2/2 alcohol withdrawal, decompensated heart failure and aspiration pneumonia.  Prior to hospitalization was prescribed Amiodarone 200mg, Cardioverted once in 2018 in setting of etoh withdrawal. Currently on Metoprolol [id number] mg daily    #Alcoholic hepatitis-alcohol cessation counseling in the hospital. PCP to manage    # Alcohol use disorder   Last drink was 1/16/21.  still refraining from Alcohol - PCP to follow    3.  Follow-up   Lasix increase to 20 mg daily   BMP- in 7 days  [facility] in October         I reviewed patients pertinent clinical laboratory studies  I reviewed patients medications  I reviewed patients pertinent medical records      Johnny MURPHY CNP  [facility]

## 2021-09-01 NOTE — PATIENT INSTRUCTIONS
Take your medicines every day, as directed    Changes made today:  o Lasix 20 mg daily   o    Monitor Your Weight and Symptoms    Contact us if you:      Gain 2 pounds in one day or 5 pounds in one week    Feel more short of breath    Notice more leg swelling    Feel lightheadeded   Change your lifestyle    Limit Salt or Sodium:    2000 mg  Limit Fluids:    2000 mL or approximately 64 ounces  Eat a Heart Healthy Diet    Low in saturated fats  Stay Active:    Aim to move at least 150 minutes every  week         To Contact us    During Business Hours:  [phone], option # 1 (University)  Then option # 4 (medical questions)     After hours, weekends or holidays:   [phone], Option #4  Ask to speak to the On-Call Cardiologist. Inform them you are a CORE/heart failure patient at the [employer].     Use [employer] allows you to communicate directly with your heart team through secure messaging.    [employer] can be accessed any time on your phone, computer, or tablet.    If you need assistance, we'd be happy to help!         Keep your Heart Appointments:    [facility] in one week    [facility] in October

## 2021-09-01 NOTE — PROGRESS NOTES
"Cordell Adams Jr.'s goals for this visit include: Nothing specific. Normal check in.       He requests these members of his care team be copied on today's visit information: PCP    PCP: Becca Morton    Referring Provider:  No referring provider defined for this encounter.    /87 (BP Location: Right arm, Patient Position: Sitting, Cuff Size: Adult Regular)   Pulse 64   Ht 1.725 m (5' 7.91\")   Wt 84.9 kg (187 lb 3.2 oz)   SpO2 100%   BMI 28.54 kg/m      Do you need any medication refills at today's visit? Yes. José Luis.     Jose Luis Meraz, EMT  Clinic Support  [facility]    [phone]    Employed by [facility] [employer]        "

## 2021-09-08 ENCOUNTER — PATIENT OUTREACH (OUTPATIENT)
Dept: CARDIOLOGY | Facility: CLINIC | Age: 56
End: 2021-09-08

## 2021-09-08 ENCOUNTER — LAB (OUTPATIENT)
Dept: LAB | Facility: CLINIC | Age: 56
End: 2021-09-08
Payer: COMMERCIAL

## 2021-09-08 DIAGNOSIS — I50.22 CHRONIC SYSTOLIC HEART FAILURE (H): ICD-10-CM

## 2021-09-08 LAB
ANION GAP SERPL CALCULATED.3IONS-SCNC: 3 MMOL/L (ref 3–14)
BUN SERPL-MCNC: 13 MG/DL (ref 7–30)
CALCIUM SERPL-MCNC: 8.7 MG/DL (ref 8.5–10.1)
CHLORIDE BLD-SCNC: 110 MMOL/L (ref 94–109)
CO2 SERPL-SCNC: 27 MMOL/L (ref 20–32)
CREAT SERPL-MCNC: 1.6 MG/DL (ref 0.66–1.25)
GFR SERPL CREATININE-BSD FRML MDRD: 48 ML/MIN/1.73M2
GLUCOSE BLD-MCNC: 108 MG/DL (ref 70–99)
POTASSIUM BLD-SCNC: 4.5 MMOL/L (ref 3.4–5.3)
SODIUM SERPL-SCNC: 140 MMOL/L (ref 133–144)

## 2021-09-08 PROCEDURE — 80048 BASIC METABOLIC PNL TOTAL CA: CPT

## 2021-09-08 PROCEDURE — 36415 COLL VENOUS BLD VENIPUNCTURE: CPT

## 2021-09-08 NOTE — TELEPHONE ENCOUNTER
Patient was seen in the [facility] clinic on 9/1/21. The plan following the visit was Lasix increase to 20 mg daily   BMP- in 7 days  [facility] in October . At that visit the patient reported their weight to be 185 pounds.     Most recent weights: 185 pounds    Patient states their shortness of breath is same    Patient states their swelling is same.     The patient noticed no change with the increased dose in Lasix.     I reviewed with Johnny, patient should try stopping the Lasix. I will plan to call patient on Monday, but if he notices weight gain, increasing SOB or swelling, I advised patient to call. Patient agreeable to the plan.   Gerardo Gill RN   Cardiology Care Coordinator  [facility]   Phone: [phone]  Fax: [phone]

## 2021-09-13 ENCOUNTER — PATIENT OUTREACH (OUTPATIENT)
Dept: CARDIOLOGY | Facility: CLINIC | Age: 56
End: 2021-09-13

## 2021-09-13 NOTE — TELEPHONE ENCOUNTER
Follow up call to patient to see how patient is doing since their most recent medication change. The plan on 9/8/21 was for the patient to stop the Lasix. Patients weight at that time was 185 pounds.      Attempted to reach patient x4 on his cell phone, no answer, no voicemail. Patient's home phone is out of service.     Will send letter to patient.     Gerardo Gill RN   Cardiology Care Coordinator  [facility]   Phone: [phone]  Fax: [phone]

## 2021-09-13 NOTE — LETTER
September 15, 2021      Cordell Adams Jr.  [address]  [place] [address]              Dear Cordell,      We have attempted to contact you multiple times to check in and see how you have been feeling since stopping the Lasix. We have been unable to reach you and unable to leave you a voicemail. Please call our clinic to follow up at [phone].        Sincerely,      Gerardo Gill RN   Cardiology Care Coordinator  [facility]   Phone: [phone]  Fax: [phone]

## 2021-10-20 ENCOUNTER — OFFICE VISIT (OUTPATIENT)
Dept: CARDIOLOGY | Facility: CLINIC | Age: 56
End: 2021-10-20
Payer: COMMERCIAL

## 2021-10-20 ENCOUNTER — LAB (OUTPATIENT)
Dept: LAB | Facility: CLINIC | Age: 56
End: 2021-10-20
Payer: COMMERCIAL

## 2021-10-20 VITALS
DIASTOLIC BLOOD PRESSURE: 82 MMHG | OXYGEN SATURATION: 100 % | WEIGHT: 188.9 LBS | SYSTOLIC BLOOD PRESSURE: 138 MMHG | HEART RATE: 62 BPM | HEIGHT: 68 IN | BODY MASS INDEX: 28.63 KG/M2

## 2021-10-20 DIAGNOSIS — I50.22 CHRONIC SYSTOLIC HEART FAILURE (H): Primary | ICD-10-CM

## 2021-10-20 DIAGNOSIS — Z87.898 HISTORY OF ALCOHOL USE: ICD-10-CM

## 2021-10-20 DIAGNOSIS — I50.22 CHRONIC SYSTOLIC HEART FAILURE (H): ICD-10-CM

## 2021-10-20 DIAGNOSIS — I25.5 ISCHEMIC CARDIOMYOPATHY: ICD-10-CM

## 2021-10-20 DIAGNOSIS — I34.0 NONRHEUMATIC MITRAL VALVE REGURGITATION: ICD-10-CM

## 2021-10-20 DIAGNOSIS — I48.91 ATRIAL FIBRILLATION WITH RVR (H): ICD-10-CM

## 2021-10-20 DIAGNOSIS — K70.10 ALCOHOLIC HEPATITIS, UNSPECIFIED WHETHER ASCITES PRESENT (H): ICD-10-CM

## 2021-10-20 DIAGNOSIS — I25.10 CORONARY ARTERY DISEASE INVOLVING NATIVE CORONARY ARTERY OF NATIVE HEART WITHOUT ANGINA PECTORIS: ICD-10-CM

## 2021-10-20 LAB
ANION GAP SERPL CALCULATED.3IONS-SCNC: 4 MMOL/L (ref 3–14)
BUN SERPL-MCNC: 14 MG/DL (ref 7–30)
CALCIUM SERPL-MCNC: 8.5 MG/DL (ref 8.5–10.1)
CHLORIDE BLD-SCNC: 110 MMOL/L (ref 94–109)
CO2 SERPL-SCNC: 27 MMOL/L (ref 20–32)
CREAT SERPL-MCNC: 1.5 MG/DL (ref 0.66–1.25)
GFR SERPL CREATININE-BSD FRML MDRD: 52 ML/MIN/1.73M2
GLUCOSE BLD-MCNC: 112 MG/DL (ref 70–99)
POTASSIUM BLD-SCNC: 4.1 MMOL/L (ref 3.4–5.3)
SODIUM SERPL-SCNC: 141 MMOL/L (ref 133–144)

## 2021-10-20 PROCEDURE — 80048 BASIC METABOLIC PNL TOTAL CA: CPT

## 2021-10-20 PROCEDURE — 36415 COLL VENOUS BLD VENIPUNCTURE: CPT

## 2021-10-20 PROCEDURE — 99213 OFFICE O/P EST LOW 20 MIN: CPT | Performed by: NURSE PRACTITIONER

## 2021-10-20 ASSESSMENT — MIFFLIN-ST. JEOR: SCORE: 1668.72

## 2021-10-20 NOTE — PROGRESS NOTES
"Cordell Adams Jr.'s goals for this visit include: Just a routine visit. Maintenance.     He requests these members of his care team be copied on today's visit information: PCP    PCP: Becca Morton    Referring Provider:  No referring provider defined for this encounter.    /82 (BP Location: Left arm, Patient Position: Sitting, Cuff Size: Adult Regular)   Pulse 62   Ht 1.715 m (5' 7.52\")   Wt 85.7 kg (188 lb 14.4 oz)   SpO2 100%   BMI 29.13 kg/m      Do you need any medication refills at today's visit? No.    Jose Luis Meraz, EMT  Clinic Support  [facility]    [phone]    Employed by [facility] [employer]        "

## 2021-10-20 NOTE — PROGRESS NOTES
In person   HPI: Cordell is a 55 year old White male with a past medical history of severe MR 2/2 flail P1 posterior mitral leaflet, CAD (s/p PCI to pLAD in 2019), pAF on Eliquis pta, alcohol use disorder c/b withdrawal seizures, and PE (Nov 2019) who was transferred from [facility] on 1/21/2021 for management of newly diagnosed cardiomyopathy (LVEF ~15-20%) and surgical consideration of known severe mitral regurgitation    Inpatient from 1/21-1/28- acute decompensated HFrEF in the setting of afib with RVR, alcohol intoxication and volume overload. An echocardiogram was obtained and showed severely reduced LV function (EF 15-20%) w/ severe diffuse hypokinesis, mild RVSD w/ mild dilation, and severe MR w/ a flail posterior mitral leaflet (p2 scallop). Euvolemia was achieved and has been maintained with 20mg PO lasix daily.    Patient states he has no SOB /ARGUELLO . Patient has no swelling in the legs/ abdomen. Was suppose to be taking a diuretic and hasn't been taking any.  Patient has a scale at home and weighs himself and is 185 lbs ( higher than before 178 lbs) . Weight fluctuates: 185-186 lbs( higher than prior) . Appetite is good. Hasn't been as active. Patient prepares his own meals. His fiance watches what he eats as well he states. He has been staying within the 64 oz of fluid he feels.  He has been sober since hospitalization. Hasn't taken morning meds yet.    Denies PND, orthopnea, chest pain, palpitations, lightheadedness, dizziness, near syncopal/syncopal episodes. Cordell has been following salt and fluid restrictions.        PAST MEDICAL HISTORY:  No past medical history on file.    FAMILY HISTORY:  No family history on file.    SOCIAL HISTORY:  Social History     Tobacco Use     Smoking status: Never Smoker     Smokeless tobacco: Never Used   Substance Use Topics     Alcohol use: None     Drug use: None           CURRENT MEDICATIONS:  Current Outpatient Medications   Medication Sig Dispense  "Refill     acetaminophen (TYLENOL) 325 MG tablet Take 2 tablets (650 mg) by mouth every 4 hours as needed for mild pain 30 tablet 0     amiodarone (PACERONE) 200 MG tablet Take 1 tablet (200 mg) by mouth 2 times daily 180 tablet 3     aspirin (ASA) 81 MG chewable tablet Take 1 tablet (81 mg) by mouth daily 90 tablet 3     atorvastatin (LIPITOR) 40 MG tablet Take 1 tablet (40 mg) by mouth every evening 90 tablet 3     lisinopril (ZESTRIL) 20 MG tablet Take 1 tablet (20 mg) by mouth 2 times daily 60 tablet 3     metoprolol succinate ER (TOPROL-XL) 25 MG 24 hr tablet Take 3 tablets (75 mg) by mouth daily (Patient taking differently: Take 25 mg by mouth daily Taking with 50 mg tablet. Total dose of 75 mg a day.) 90 tablet 3     metoprolol succinate ER (TOPROL-XL) 50 MG 24 hr tablet 50 mg daily Taking with 25 mg, total dose of 75 mg a day       rivaroxaban ANTICOAGULANT (XARELTO) 20 MG TABS tablet Take 1 tablet (20 mg) by mouth daily (with dinner) 90 tablet 3     folic acid (FOLVITE) 1 MG tablet Take 1 tablet (1 mg) by mouth daily (Patient not taking: Reported on 9/1/2021) 30 tablet 0     furosemide (LASIX) 20 MG tablet Take 0.5 tablets (10 mg) by mouth daily (Patient not taking: Reported on 10/20/2021) 90 tablet 3     polyethylene glycol (MIRALAX) 17 GM/Dose powder Take 17 g by mouth daily (Patient not taking: Reported on 9/1/2021) 510 g 0       ROS:  Review Of Systems  Skin: negative  Eyes: negative  Ears/Nose/Throat: negative  Respiratory: No shortness of breath, dyspnea on exertion, cough, or hemoptysis  Cardiovascular: negative  Gastrointestinal: negative  Genitourinary: negative  Musculoskeletal: negative  Neurologic: negative  Psychiatric: negative  Hematologic/Lymphatic/Immunologic: negative  Endocrine: negative      EXAM:  /82 (BP Location: Left arm, Patient Position: Sitting, Cuff Size: Adult Regular)   Pulse 62   Ht 1.715 m (5' 7.52\")   Wt 85.7 kg (188 lb 14.4 oz)   SpO2 100%   BMI 29.13 kg/m  "   Home weight:  General: alert, articulate, and in no acute distress.  HEENT: normocephalic, atraumatic, anicteric sclera, EOMI, mucosa moist, no cyanosis.   Neck: neck supple.  No adenopathy, masses, or carotid bruits.  JVP not detected at 45 degrees  Heart: regular rhythm, normal S1/S2, no murmur, gallop, rub.  Precordium quiet with normal PMI.     Lungs: clear, no rales, ronchi, or wheezing.  No accessory muscle use, respirations unlabored.   Abdomen: soft, non-tender, bowel sounds present, no hepatomegaly  Extremities: trace edema.   No cyanosis.    Neurological: alert and oriented x 3.  normal speech and affect, no gross motor deficits  Skin:  No ecchymoses, rashes, or clubbing.    Labs:  CBC RESULTS:  Lab Results   Component Value Date    WBC 3.8 (L) 01/27/2021    RBC 4.41 01/27/2021    HGB 15.0 01/27/2021    HCT 45.3 01/27/2021     (H) 01/27/2021    MCH 34.0 (H) 01/27/2021    MCHC 33.1 01/27/2021    RDW 15.7 (H) 01/27/2021     01/27/2021       CMP RESULTS:  Lab Results   Component Value Date     10/20/2021     06/14/2021    POTASSIUM 4.1 10/20/2021    POTASSIUM 4.1 06/14/2021    CHLORIDE 110 (H) 10/20/2021    CHLORIDE 109 06/14/2021    CO2 27 10/20/2021    CO2 27 06/14/2021    ANIONGAP 4 10/20/2021    ANIONGAP 5 06/14/2021     (H) 10/20/2021     (H) 06/14/2021    BUN 14 10/20/2021    BUN 15 06/14/2021    CR 1.50 (H) 10/20/2021    CR 1.39 (H) 06/14/2021    GFRESTIMATED 52 (L) 10/20/2021    GFRESTIMATED 57 (L) 06/14/2021    GFRESTBLACK 66 06/14/2021    BRAD 8.5 10/20/2021    BRAD 8.7 06/14/2021    BILITOTAL 1.7 (H) 01/28/2021    ALBUMIN 2.8 (L) 01/28/2021    ALKPHOS 100 01/28/2021     (H) 01/28/2021    AST 80 (H) 01/28/2021        INR RESULTS:  Lab Results   Component Value Date    INR 1.02 01/25/2021       No components found for: CK  Lab Results   Component Value Date    MAG 2.1 01/26/2021     Lab Results   Component Value Date    NTBNP 70 04/28/2021     @BRIEFLABR  (dig)@    Most recent echocardiogram:  No results found for this or any previous visit (from the past 8760 hour(s)).      Assessment and Plan:    In summary,Cordell  is a 55 year old male with history of severe MR 2/2 flail P1 posterior mitral leaflet, CAD (s/p PCI to pLAD in 2019), pAF on Eliquis pta, alcohol use disorder c/b withdrawal seizures, and PE (Nov 2019) who was transferred from [facility] on 1/21/2021 for management of newly diagnosed cardiomyopathy (LVEF ~15-20%- now 45-50 % and surgical consideration of known severe mitral regurgitation.     1.  Chronic systolic heart failure secondary to ICM.  Stage C  NYHA Class III  ACEi/ARB: yes- lisinopril 20 mg BID-no further increases   BB: yes-Toprol 75 mg - no further increase   Aldosterone antagonist: recovered EF  3/4/21- 45-50%   SCD prophylaxis: EF improved  3/4/21- 45-50%  Fluid status: hypervolemic- furosemide 20 mg daily  Anticoagulation: xaralto  Antiplatelet:  ASA dose   Sleep apnea:not discussed  NSAID use:  Contraindicated.  Avoid use.  Remote Monitoring:none    2.  Other comordbid conditions:     # Severe mitral regurgitation  2/2 flail posterior mitral leaflet.  During admission at the [facility], he was seen by interventional cardiology and structural cardiology. There is concern at this time that he would tolerate complete surgical open repair given markedly reduced LVEF- may have difficulty coming off bypass. Other option would be a mitral clip. Needs to schedule appt and testing.     # Paroxysmal Afib with RVR, currently relatively rate controlled   Cardioverted once in 2018 in setting of etoh withdrawal. Currently on Metoprolol [id number] mg daily    #Alcoholic hepatitis-alcohol cessation counseling done? PCP to manage    # Alcohol use disorder   Last drink was 1/16/21. still refraining from Alcohol - PCP to follow    3.  Follow-up   Needs follow up with Dr. Wade next available.  TAVR follow up as well. (last seen 3/4/21)  Lasix  increase to 20 mg daily   BMP- next week ( Tuesday)  CORE 4-6 weeks.        I reviewed patients pertinent clinical laboratory studies  I reviewed patients medications  I reviewed patients pertinent medical records      Johnny MURPHY CNP  [facility]

## 2021-10-20 NOTE — PATIENT INSTRUCTIONS
Take your medicines every day, as directed    Changes made today:  o Lasix 20 mg daily   o    Monitor Your Weight and Symptoms    Contact us if you:      Gain 2 pounds in one day or 5 pounds in one week    Feel more short of breath    Notice more leg swelling    Feel lightheadeded   Change your lifestyle    Limit Salt or Sodium:    2000 mg  Limit Fluids:    2000 mL or approximately 64 ounces  Eat a Heart Healthy Diet    Low in saturated fats  Stay Active:    Aim to move at least 150 minutes every  week         To Contact us    During Business Hours:  [phone], option # 1 (University)  Then option # 4 (medical questions)     After hours, weekends or holidays:   [phone], Option #4  Ask to speak to the On-Call Cardiologist. Inform them you are a CORE/heart failure patient at the [place].     Use [employer] allows you to communicate directly with your heart team through secure messaging.    [facility] can be accessed any time on your phone, computer, or tablet.    If you need assistance, we'd be happy to help!         Keep your Heart Appointments:    BMP next week ( Tuesday)   Dr. Wade - next available  TAVR - follow up needed  CORE 4-6 weeks

## 2021-10-22 ENCOUNTER — TELEPHONE (OUTPATIENT)
Dept: CARDIOLOGY | Facility: CLINIC | Age: 56
End: 2021-10-22

## 2021-10-22 NOTE — TELEPHONE ENCOUNTER
----- Message from Lanny Hannah RN sent at 10/20/2021  3:13 PM CDT -----  Regarding: FW: appt with Dr. Wade and TAVR f/u  Hi - are you able to reach out to patient and schedule next available with Dr Wade with labs prior? Ok to use vad/txp spot.     Sue is working on the TAVR piece of it.   Thanks you! Lety CUEVA   ----- Message -----  From: Johnny Gonzales APRN CNP  Sent: 10/20/2021   9:26 AM CDT  To: Susana Workman RN, Lanny Hannah RN, #  Subject: appt with Dr. Wade and TAVR f/u              Hello Dain CLEVELAND was looking back for follow up guidance and saw that Jorgito hasn't been seen by Dr. Wade since February and she wanted something 4 months out as well as a note by Dr. Aranda to see Jorgito a few months out after April regarding potential TAVR.  If we can please get these appts set up ?      Johnny

## 2021-10-22 NOTE — TELEPHONE ENCOUNTER
Called mobile number and it rings and no way to leave VM. I tried home number but it is disconnected.

## 2021-10-22 NOTE — TELEPHONE ENCOUNTER
Called and no answer or vm , home number isn't working. Please schedule with Dr Wade return heart failure with labs asap     Will reach out again .

## 2021-10-26 ENCOUNTER — LAB (OUTPATIENT)
Dept: LAB | Facility: CLINIC | Age: 56
End: 2021-10-26
Payer: COMMERCIAL

## 2021-10-26 DIAGNOSIS — I50.22 CHRONIC SYSTOLIC HEART FAILURE (H): ICD-10-CM

## 2021-10-26 LAB
ANION GAP SERPL CALCULATED.3IONS-SCNC: 4 MMOL/L (ref 3–14)
BUN SERPL-MCNC: 24 MG/DL (ref 7–30)
CALCIUM SERPL-MCNC: 9 MG/DL (ref 8.5–10.1)
CHLORIDE BLD-SCNC: 107 MMOL/L (ref 94–109)
CO2 SERPL-SCNC: 27 MMOL/L (ref 20–32)
CREAT SERPL-MCNC: 1.65 MG/DL (ref 0.66–1.25)
GFR SERPL CREATININE-BSD FRML MDRD: 47 ML/MIN/1.73M2
GLUCOSE BLD-MCNC: 113 MG/DL (ref 70–99)
POTASSIUM BLD-SCNC: 4.5 MMOL/L (ref 3.4–5.3)
SODIUM SERPL-SCNC: 138 MMOL/L (ref 133–144)

## 2021-10-26 PROCEDURE — 36415 COLL VENOUS BLD VENIPUNCTURE: CPT

## 2021-10-26 PROCEDURE — 80048 BASIC METABOLIC PNL TOTAL CA: CPT

## 2021-10-27 ENCOUNTER — PATIENT OUTREACH (OUTPATIENT)
Dept: CARDIOLOGY | Facility: CLINIC | Age: 56
End: 2021-10-27

## 2021-10-27 DIAGNOSIS — I50.9 ACUTE DECOMPENSATED HEART FAILURE (H): Primary | ICD-10-CM

## 2021-10-27 RX ORDER — FUROSEMIDE 20 MG
20 TABLET ORAL EVERY OTHER DAY
Qty: 90 TABLET | Refills: 3 | COMMUNITY
Start: 2021-10-27 | End: 2021-11-24

## 2021-10-27 NOTE — TELEPHONE ENCOUNTER
Patient was seen in the CORE clinic on 10/20/21. The plan following the visit was Needs follow up with Dr. Wade next available.  TAVR follow up as well. (last seen 3/4/21)  Lasix increase to 20 mg daily   BMP- next week ( Tuesday)  CORE 4-6 weeks.. At that visit the patient reported their weight to be 185.     Most recent weights: 185 pounds    Patient denies SOB and swelling. Patient states he is feeling the same as his last visit.     Reviewed labs with patient, creatinine is elevated from last week.     Current Medications: 20 mg of Lasix daily     Follow up Appointments: 11/24/21    Patient is due to follow up with Dr. Wade, it looks like the [facility] has been trying to contact the patient. Patient confirmed he has the phone number and will call back.     Message will be sent to Johnny Gonzales CNP to review.     Gerardo Gill RN   Cardiology Care Coordinator  [facility]   Phone: [phone]  Fax: [phone]

## 2021-10-27 NOTE — TELEPHONE ENCOUNTER
Per Batul patient to reduce Lasix to 20 mg every other day.     Gerardo Gill RN   Cardiology Care Coordinator  [facility]   Phone: [phone]  Fax: [phone]

## 2021-10-28 ENCOUNTER — TELEPHONE (OUTPATIENT)
Dept: CARDIOLOGY | Facility: CLINIC | Age: 56
End: 2021-10-28

## 2021-11-01 ENCOUNTER — TELEPHONE (OUTPATIENT)
Dept: CARDIOLOGY | Facility: CLINIC | Age: 56
End: 2021-11-01

## 2021-11-01 NOTE — TELEPHONE ENCOUNTER
3rd attempt- Called mobile number, it rings and no way to leave VM. I tried home number but it is disconnected. I have also sent out a letter.

## 2021-11-05 NOTE — TELEPHONE ENCOUNTER
Attempted to call Jorgito to set up follow up.  No answer, VM not set up.  Letter has been sent previously.

## 2021-11-24 ENCOUNTER — OFFICE VISIT (OUTPATIENT)
Dept: CARDIOLOGY | Facility: CLINIC | Age: 56
End: 2021-11-24
Payer: COMMERCIAL

## 2021-11-24 ENCOUNTER — LAB (OUTPATIENT)
Dept: LAB | Facility: CLINIC | Age: 56
End: 2021-11-24
Payer: COMMERCIAL

## 2021-11-24 VITALS
HEIGHT: 68 IN | WEIGHT: 187 LBS | BODY MASS INDEX: 28.34 KG/M2 | HEART RATE: 59 BPM | OXYGEN SATURATION: 100 % | DIASTOLIC BLOOD PRESSURE: 72 MMHG | SYSTOLIC BLOOD PRESSURE: 110 MMHG

## 2021-11-24 DIAGNOSIS — I50.22 CHRONIC SYSTOLIC HEART FAILURE (H): Primary | ICD-10-CM

## 2021-11-24 DIAGNOSIS — K70.10 ALCOHOLIC HEPATITIS, UNSPECIFIED WHETHER ASCITES PRESENT (H): ICD-10-CM

## 2021-11-24 DIAGNOSIS — I25.5 ISCHEMIC CARDIOMYOPATHY: ICD-10-CM

## 2021-11-24 DIAGNOSIS — I50.9 ACUTE DECOMPENSATED HEART FAILURE (H): ICD-10-CM

## 2021-11-24 DIAGNOSIS — I50.22 CHRONIC SYSTOLIC HEART FAILURE (H): ICD-10-CM

## 2021-11-24 DIAGNOSIS — Z87.898 HISTORY OF ALCOHOL USE: ICD-10-CM

## 2021-11-24 DIAGNOSIS — I48.91 ATRIAL FIBRILLATION WITH RVR (H): ICD-10-CM

## 2021-11-24 DIAGNOSIS — I25.10 CORONARY ARTERY DISEASE INVOLVING NATIVE CORONARY ARTERY OF NATIVE HEART WITHOUT ANGINA PECTORIS: ICD-10-CM

## 2021-11-24 LAB
ANION GAP SERPL CALCULATED.3IONS-SCNC: 4 MMOL/L (ref 3–14)
BUN SERPL-MCNC: 17 MG/DL (ref 7–30)
CALCIUM SERPL-MCNC: 9 MG/DL (ref 8.5–10.1)
CHLORIDE BLD-SCNC: 109 MMOL/L (ref 94–109)
CO2 SERPL-SCNC: 27 MMOL/L (ref 20–32)
CREAT SERPL-MCNC: 1.75 MG/DL (ref 0.66–1.25)
GFR SERPL CREATININE-BSD FRML MDRD: 43 ML/MIN/1.73M2
GLUCOSE BLD-MCNC: 114 MG/DL (ref 70–99)
POTASSIUM BLD-SCNC: 4.9 MMOL/L (ref 3.4–5.3)
SODIUM SERPL-SCNC: 140 MMOL/L (ref 133–144)

## 2021-11-24 PROCEDURE — 80048 BASIC METABOLIC PNL TOTAL CA: CPT

## 2021-11-24 PROCEDURE — 99213 OFFICE O/P EST LOW 20 MIN: CPT | Performed by: NURSE PRACTITIONER

## 2021-11-24 PROCEDURE — 36415 COLL VENOUS BLD VENIPUNCTURE: CPT

## 2021-11-24 RX ORDER — FUROSEMIDE 20 MG
20 TABLET ORAL DAILY
Qty: 90 TABLET | Refills: 3 | Status: SHIPPED | OUTPATIENT
Start: 2021-11-24

## 2021-11-24 RX ORDER — FUROSEMIDE 20 MG
20 TABLET ORAL EVERY OTHER DAY
Qty: 90 TABLET | Refills: 3 | Status: SHIPPED | OUTPATIENT
Start: 2021-11-24 | End: 2021-11-24

## 2021-11-24 RX ORDER — METOPROLOL SUCCINATE 25 MG/1
75 TABLET, EXTENDED RELEASE ORAL DAILY
Qty: 90 TABLET | Refills: 3 | Status: SHIPPED | OUTPATIENT
Start: 2021-11-24

## 2021-11-24 RX ORDER — LISINOPRIL 20 MG/1
20 TABLET ORAL 2 TIMES DAILY
Qty: 60 TABLET | Refills: 3 | Status: SHIPPED | OUTPATIENT
Start: 2021-11-24

## 2021-11-24 ASSESSMENT — MIFFLIN-ST. JEOR: SCORE: 1658.24

## 2021-11-24 NOTE — PROGRESS NOTES
In person   HPI: Cordell is a 56 year old White male with a past medical history of severe MR 2/2 flail P1 posterior mitral leaflet, CAD (s/p PCI to pLAD in 2019), pAF on Eliquis pta, alcohol use disorder c/b withdrawal seizures, and PE (Nov 2019) who was transferred from [facility] on 1/21/2021 for management of newly diagnosed cardiomyopathy (LVEF ~15-20%) and surgical consideration of known severe mitral regurgitation    Inpatient from 1/21-1/28- acute decompensated HFrEF in the setting of afib with RVR, alcohol intoxication and volume overload. An echocardiogram was obtained and showed severely reduced LV function (EF 15-20%) w/ severe diffuse hypokinesis, mild RVSD w/ mild dilation, and severe MR w/ a flail posterior mitral leaflet (p2 scallop). Euvolemia was achieved and has been maintained with 20mg PO lasix daily.    Patient states he has no SOB /ARGUELLO . Patient has no swelling in the legs/ abdomen.  Patient has a scale at home and weighs himself and is 185 lbs ( higher than before 178 lbs) . Weight fluctuates: 185-186 lbs( higher than prior) . Appetite is good. Hasn't been as active. Patient prepares his own meals. His fiance watches what he eats as well he states. He has been staying within the 64 oz of fluid he feels.  He has been sober since hospitalization. Hasn't taken morning meds yet.    Denies PND, orthopnea, chest pain, palpitations, lightheadedness, dizziness, near syncopal/syncopal episodes. Cordell has been following salt and fluid restrictions.        PAST MEDICAL HISTORY:  No past medical history on file.    FAMILY HISTORY:  No family history on file.    SOCIAL HISTORY:  Social History     Tobacco Use     Smoking status: Never Smoker     Smokeless tobacco: Never Used   Substance Use Topics     Alcohol use: None     Drug use: None           CURRENT MEDICATIONS:  Current Outpatient Medications   Medication Sig Dispense Refill     acetaminophen (TYLENOL) 325 MG tablet Take 2 tablets  "(650 mg) by mouth every 4 hours as needed for mild pain 30 tablet 0     amiodarone (PACERONE) 200 MG tablet Take 1 tablet (200 mg) by mouth 2 times daily 180 tablet 3     aspirin (ASA) 81 MG chewable tablet Take 1 tablet (81 mg) by mouth daily 90 tablet 3     atorvastatin (LIPITOR) 40 MG tablet Take 1 tablet (40 mg) by mouth every evening 90 tablet 3     furosemide (LASIX) 20 MG tablet Take 1 tablet (20 mg) by mouth daily 90 tablet 3     lisinopril (ZESTRIL) 20 MG tablet Take 1 tablet (20 mg) by mouth 2 times daily 60 tablet 3     metoprolol succinate ER (TOPROL-XL) 25 MG 24 hr tablet Take 3 tablets (75 mg) by mouth daily 90 tablet 3     metoprolol succinate ER (TOPROL-XL) 50 MG 24 hr tablet 50 mg daily Taking with 25 mg, total dose of 75 mg a day       rivaroxaban ANTICOAGULANT (XARELTO) 20 MG TABS tablet Take 1 tablet (20 mg) by mouth daily (with dinner) 90 tablet 3     folic acid (FOLVITE) 1 MG tablet Take 1 tablet (1 mg) by mouth daily (Patient not taking: Reported on 9/1/2021) 30 tablet 0     polyethylene glycol (MIRALAX) 17 GM/Dose powder Take 17 g by mouth daily (Patient not taking: Reported on 9/1/2021) 510 g 0       ROS:  Review Of Systems  Skin: negative  Eyes: negative  Ears/Nose/Throat: negative  Respiratory: No shortness of breath, dyspnea on exertion, cough, or hemoptysis  Cardiovascular: negative  Gastrointestinal: negative  Genitourinary: negative  Musculoskeletal: negative  Neurologic: negative  Psychiatric: negative  Hematologic/Lymphatic/Immunologic: negative  Endocrine: negative      EXAM:  /72 (BP Location: Left arm, Patient Position: Sitting, Cuff Size: Adult Regular)   Pulse 59   Ht 1.72 m (5' 7.72\")   Wt 84.8 kg (187 lb)   SpO2 100%   BMI 28.67 kg/m    Home weight:  General: alert, articulate, and in no acute distress.  HEENT: normocephalic, atraumatic, anicteric sclera, EOMI, mucosa moist, no cyanosis.   Neck: neck supple.  No adenopathy, masses, or carotid bruits.  JVP not " detected at 45 degrees  Heart: regular rhythm, normal S1/S2, no murmur, gallop, rub.  Precordium quiet with normal PMI.     Lungs: clear, no rales, ronchi, or wheezing.  No accessory muscle use, respirations unlabored.   Abdomen: soft, non-tender, bowel sounds present, no hepatomegaly  Extremities: trace edema.   No cyanosis.    Neurological: alert and oriented x 3.  normal speech and affect, no gross motor deficits  Skin:  No ecchymoses, rashes, or clubbing.    Labs:  CBC RESULTS:  Lab Results   Component Value Date    WBC 3.8 (L) 01/27/2021    RBC 4.41 01/27/2021    HGB 15.0 01/27/2021    HCT 45.3 01/27/2021     (H) 01/27/2021    MCH 34.0 (H) 01/27/2021    MCHC 33.1 01/27/2021    RDW 15.7 (H) 01/27/2021     01/27/2021       CMP RESULTS:  Lab Results   Component Value Date     11/24/2021     06/14/2021    POTASSIUM 4.9 11/24/2021    POTASSIUM 4.1 06/14/2021    CHLORIDE 109 11/24/2021    CHLORIDE 109 06/14/2021    CO2 27 11/24/2021    CO2 27 06/14/2021    ANIONGAP 4 11/24/2021    ANIONGAP 5 06/14/2021     (H) 11/24/2021     (H) 06/14/2021    BUN 17 11/24/2021    BUN 15 06/14/2021    CR 1.75 (H) 11/24/2021    CR 1.39 (H) 06/14/2021    GFRESTIMATED 43 (L) 11/24/2021    GFRESTIMATED 57 (L) 06/14/2021    GFRESTBLACK 66 06/14/2021    BRAD 9.0 11/24/2021    BRAD 8.7 06/14/2021    BILITOTAL 1.7 (H) 01/28/2021    ALBUMIN 2.8 (L) 01/28/2021    ALKPHOS 100 01/28/2021     (H) 01/28/2021    AST 80 (H) 01/28/2021        INR RESULTS:  Lab Results   Component Value Date    INR 1.02 01/25/2021       No components found for: CK  Lab Results   Component Value Date    MAG 2.1 01/26/2021     Lab Results   Component Value Date    NTBNP 70 04/28/2021     @BRIEFLABR (dig)@    Most recent echocardiogram:  No results found for this or any previous visit (from the past 8760 hour(s)).      Assessment and Plan:    In summary,Cordell  is a 56 year old male with history of severe MR 2/2 flail  P1 posterior mitral leaflet, CAD (s/p PCI to pLAD in 2019), pAF on Eliquis pta, alcohol use disorder c/b withdrawal seizures, and PE (Nov 2019) who was transferred from [facility] on 1/21/2021 for management of newly diagnosed cardiomyopathy (LVEF ~15-20%- now 45-50 % and surgical consideration of known severe mitral regurgitation. - has not been seen again by TAVR as recommended.     1.  Chronic systolic heart failure secondary to ICM.  Stage C  NYHA Class III  ACEi/ARB: yes- lisinopril 20 mg BID-no further increases   BB: yes-Toprol 75 mg - no further increase   Aldosterone antagonist: recovered EF  3/4/21- 45-50%   SCD prophylaxis: EF improved  3/4/21- 45-50%  Fluid status: hypervolemic- furosemide 20 mg daily  Anticoagulation: xaralto  Antiplatelet:  ASA dose   Sleep apnea:not discussed  NSAID use:  Contraindicated.  Avoid use.  Remote Monitoring:none    2.  Other comordbid conditions:     # Severe mitral regurgitation  2/2 flail posterior mitral leaflet.  During admission at the [facility], he was seen by interventional cardiology and structural cardiology. There is concern at this time that he would tolerate complete surgical open repair given markedly reduced LVEF- may have difficulty coming off bypass. Other option would be a mitral clip. Needs to schedule appt and testing.     # Paroxysmal Afib with RVR, currently relatively rate controlled   Cardioverted once in 2018 in setting of etoh withdrawal. Currently on Metoprolol [id number] mg daily    #Alcoholic hepatitis-alcohol cessation counseling done? PCP to manage    # Alcohol use disorder   Last drink was 1/16/21. still refraining from Alcohol - PCP to follow    3.  Follow-up   Needs follow up with Dr. Wade next available.  Lasix increase to 20 mg daily   BMP Mon or Tuesday  CORE in 4 months           I reviewed patients pertinent clinical laboratory studies  I reviewed patients medications  I reviewed patients pertinent medical  records      Johnny Gonzales  APRN, CNP  [facility]

## 2021-11-24 NOTE — PROGRESS NOTES
"Cordell Adams Jr.'s goals for this visit include: Nothing specific. Routine maintenance.     He requests these members of his care team be copied on today's visit information: PCP    PCP: Becca Morton    Referring Provider:  No referring provider defined for this encounter.    /72 (BP Location: Left arm, Patient Position: Sitting, Cuff Size: Adult Regular)   Pulse 59   Ht 1.72 m (5' 7.72\")   Wt 84.8 kg (187 lb)   SpO2 100%   BMI 28.67 kg/m      Do you need any medication refills at today's visit? Metoprolol. Lisinopril. Furosemide.       Jose Luis Meraz, EMT  Clinic Support  [facility]    [phone]    Employed by [employer]      "

## 2021-11-24 NOTE — PATIENT INSTRUCTIONS
Take your medicines every day, as directed    Changes made today:      Lasix increase to 20 mg daily      Monitor Your Weight and Symptoms    Contact us if you:      Gain 2 pounds in one day or 5 pounds in one week    Feel more short of breath    Notice more leg swelling    Feel lightheadeded   Change your lifestyle    Limit Salt or Sodium:    2000 mg  Limit Fluids:    2000 mL or approximately 64 ounces  Eat a Heart Healthy Diet    Low in saturated fats  Stay Active:    Aim to move at least 150 minutes every  week         To Contact us    During Business Hours:  [phone]      After hours, weekends or holidays:   [phone], Option #4  Ask to speak to the On-Call Cardiologist. Inform them you are a CORE/heart failure patient at the [place].     Use [employer] allows you to communicate directly with your heart team through secure messaging.    [employer] can be accessed any time on your phone, computer, or tablet.    If you need assistance, we'd be happy to help!         Keep your Heart Appointments:    BMP Mon or Tuesday  CORE in 4 months   Needs follow up with Dr. Wade next available.

## 2021-11-30 ENCOUNTER — LAB (OUTPATIENT)
Dept: LAB | Facility: CLINIC | Age: 56
End: 2021-11-30
Payer: COMMERCIAL

## 2021-11-30 DIAGNOSIS — I50.22 CHRONIC SYSTOLIC HEART FAILURE (H): ICD-10-CM

## 2021-11-30 LAB
ANION GAP SERPL CALCULATED.3IONS-SCNC: 5 MMOL/L (ref 3–14)
BUN SERPL-MCNC: 19 MG/DL (ref 7–30)
CALCIUM SERPL-MCNC: 8.9 MG/DL (ref 8.5–10.1)
CHLORIDE BLD-SCNC: 112 MMOL/L (ref 94–109)
CO2 SERPL-SCNC: 25 MMOL/L (ref 20–32)
CREAT SERPL-MCNC: 1.75 MG/DL (ref 0.66–1.25)
GFR SERPL CREATININE-BSD FRML MDRD: 43 ML/MIN/1.73M2
GLUCOSE BLD-MCNC: 138 MG/DL (ref 70–99)
POTASSIUM BLD-SCNC: 4.6 MMOL/L (ref 3.4–5.3)
SODIUM SERPL-SCNC: 142 MMOL/L (ref 133–144)

## 2021-11-30 PROCEDURE — 36415 COLL VENOUS BLD VENIPUNCTURE: CPT

## 2021-11-30 PROCEDURE — 80048 BASIC METABOLIC PNL TOTAL CA: CPT

## 2021-12-02 ENCOUNTER — PATIENT OUTREACH (OUTPATIENT)
Dept: CARDIOLOGY | Facility: CLINIC | Age: 56
End: 2021-12-02
Payer: COMMERCIAL

## 2021-12-02 NOTE — TELEPHONE ENCOUNTER
Patient was seen in the CORE clinic on 11/24/21. The plan following the visit was Lasix increase to 20 mg daily (from 20mg every other day)   BMP Mon or Tuesday  [facility] in 4 months . At that visit the patient reported their weight to be 185 pounds.     Most recent weights: 185 pounds    Patient states their shortness of breath is the same    Patient states their swelling is the same.     Other Symptoms/Concerns: No change with the increase in Lasix    Current Medications: 20mg of Lasix daily    Follow up Appointments: 2/23/21 appt & Labs    Message will be sent to Johnny Gonzales CNP to review.         Gerardo Gill RN   Cardiology Care Coordinator  [facility]   Phone: [phone]  Fax: [phone]

## 2021-12-02 NOTE — TELEPHONE ENCOUNTER
No changes per Johnny, updated patient.     Gerardo Gill RN   Cardiology Care Coordinator  [facility]   Phone: [phone]  Fax: [phone]

## 2022-01-19 DIAGNOSIS — I50.22 CHRONIC SYSTOLIC HEART FAILURE (H): Primary | ICD-10-CM

## 2022-01-21 ENCOUNTER — PATIENT OUTREACH (OUTPATIENT)
Dept: CARDIOLOGY | Facility: CLINIC | Age: 57
End: 2022-01-21
Payer: COMMERCIAL

## 2022-01-21 NOTE — TELEPHONE ENCOUNTER
Attempted to reach Jorgito regarding adding a lab appointment on to his Dr Wade appointment next week. Unable to leave  (not set up) and 2nd number was not connected

## 2022-03-17 DIAGNOSIS — I50.22 CHRONIC SYSTOLIC HEART FAILURE (H): ICD-10-CM

## 2022-03-17 DIAGNOSIS — I50.9 ACUTE DECOMPENSATED HEART FAILURE (H): ICD-10-CM

## 2022-03-23 RX ORDER — LISINOPRIL 5 MG/1
TABLET ORAL
Qty: 90 TABLET | Refills: 3 | OUTPATIENT
Start: 2022-03-23

## 2022-03-23 NOTE — TELEPHONE ENCOUNTER
METOPROLOL ER SUCCINATE 50MG TABS  Last Written Prescription Date:  ?  Last Fill Quantity: ?   # refills: ?  Last Office Visit :  11/24/2021    (No show on 2/23/2022)  Future Office visit:  None  Routing refill request to provider for review/approval because:  Reported as historical       XARELTO 20MG TABLETS  Last Written Prescription Date:   2/18/2021  Last Fill Quantity: 90,   # refills: 3  Last Office Visit :  11/24/2021    (No show on 2/23/2022)  Future Office visit:  None  Routing refill request to provider for review/approval because:  Abnormal Creatinine and Over due PLT  Refer to clinic for review     Recent Labs   Lab Test 01/27/21  0547            Recent Labs   Lab Test 11/30/21  1216   CR 1.75*       Lolly Arzate RN  Central Triage Red Flags/Med Refills      LISINOPRIL 5MG TABLETS  lisinopril (ZESTRIL) 5 MG tablet (Discontinued) 180 tablet 3 5/5/2021 6/2/2021 No   Sig - Route: Take 2 tablets (10 mg) by mouth every morning AND 1 tablet (5 mg) every evening. - Oral   Class: Historical   Reason for Discontinue: Dose adjustment   Order: [id number]       Printout Tracking    External Result Report     This Order Has Been Discontinued    Order Status Reason By On   Discontinued Dose adjustment Johnny Gonzales APRN CNP 6/2/21 1528           Lolly Arzate RN  Central Triage Red Flags/Med Refills

## 2022-03-25 ENCOUNTER — TELEPHONE (OUTPATIENT)
Dept: CARDIOLOGY | Facility: CLINIC | Age: 57
End: 2022-03-25
Payer: COMMERCIAL

## 2022-03-25 RX ORDER — METOPROLOL SUCCINATE 50 MG/1
50 TABLET, EXTENDED RELEASE ORAL DAILY
Qty: 90 TABLET | Refills: 0 | Status: SHIPPED | OUTPATIENT
Start: 2022-03-25

## (undated) DEVICE — KIT HAND CONTROL ACIST 014644 AR-P54

## (undated) DEVICE — Device

## (undated) DEVICE — SLEEVE TR BAND RADIAL COMPRESSION DEVICE 24CM TRB24-REG

## (undated) DEVICE — INTRO SHEATH 7FRX10CM PINNACLE RSS702

## (undated) DEVICE — MANIFOLD KIT ANGIO AUTOMATED 014613

## (undated) DEVICE — TUBING PRESSURE 30"

## (undated) DEVICE — PACK HEART LEFT CUSTOM

## (undated) DEVICE — SHTH INTRO 0.021IN ID 6FR DIA

## (undated) DEVICE — FASTENER CATH BALLOON CLAMPX2 STATLOCK 0684-00-493

## (undated) RX ORDER — HEPARIN SODIUM 1000 [USP'U]/ML
INJECTION, SOLUTION INTRAVENOUS; SUBCUTANEOUS
Status: DISPENSED
Start: 2021-01-25

## (undated) RX ORDER — LIDOCAINE HYDROCHLORIDE 20 MG/ML
SOLUTION OROPHARYNGEAL
Status: DISPENSED
Start: 2021-01-22

## (undated) RX ORDER — NICARDIPINE HYDROCHLORIDE 2.5 MG/ML
INJECTION INTRAVENOUS
Status: DISPENSED
Start: 2021-01-25

## (undated) RX ORDER — FENTANYL CITRATE 50 UG/ML
INJECTION, SOLUTION INTRAMUSCULAR; INTRAVENOUS
Status: DISPENSED
Start: 2021-01-25

## (undated) RX ORDER — FENTANYL CITRATE 50 UG/ML
INJECTION, SOLUTION INTRAMUSCULAR; INTRAVENOUS
Status: DISPENSED
Start: 2021-01-22

## (undated) RX ORDER — NITROGLYCERIN 5 MG/ML
VIAL (ML) INTRAVENOUS
Status: DISPENSED
Start: 2021-01-25